# Patient Record
Sex: MALE | Race: WHITE | NOT HISPANIC OR LATINO | Employment: OTHER | URBAN - METROPOLITAN AREA
[De-identification: names, ages, dates, MRNs, and addresses within clinical notes are randomized per-mention and may not be internally consistent; named-entity substitution may affect disease eponyms.]

---

## 2020-02-14 RX ORDER — ACETAMINOPHEN 500 MG
500-1000 TABLET ORAL EVERY 6 HOURS PRN
Status: ON HOLD | COMMUNITY
End: 2024-02-27

## 2020-02-14 RX ORDER — MULTIPLE VITAMINS W/ MINERALS TAB 9MG-400MCG
1 TAB ORAL DAILY
Status: ON HOLD | COMMUNITY
End: 2024-02-27

## 2020-02-14 RX ORDER — OLMESARTAN MEDOXOMIL 40 MG/1
20 TABLET ORAL DAILY
Status: ON HOLD | COMMUNITY
End: 2024-02-27

## 2020-02-24 NOTE — OR NURSING
pts nasal culture from 2/20 mrsa neg, mssa positive, called to pt and Dr. Lundberg's office. Mupirocin ointment called to [facility] pharmacy in [place], discussed with pt ointment  rx and need for 2 extra hibilclens showeres, questions answered

## 2020-03-10 RX ORDER — NITROGLYCERIN 0.4 MG/1
0.4 TABLET SUBLINGUAL EVERY 5 MIN PRN
Status: ON HOLD | COMMUNITY
End: 2024-02-27

## 2020-03-10 ASSESSMENT — MIFFLIN-ST. JEOR: SCORE: 1711.12

## 2020-03-10 NOTE — PHARMACY-ADMISSION MEDICATION HISTORY
Medication history and patient interview completed by pre-admitting RN.  Reviewed by pharmacist, including [employer] dispense records and chart review.     Changes made to PTA medication list [per orders placed at pre-op appointment]:    Added: Nitroglycerin 0.4mg;  Changed: Olmesartan 40mg daily --> 20mg daily   Deleted: Valsartan [taking olmesartan]      Mani Mahoney, Pharm.D.      Status  Changed by  Time of change    Nurse Nicole Woodward RN  Fri Feb 14, 2020  1:13 PM     Prior to Admission medications    Medication Sig Last Dose Taking? Auth Provider   acetaminophen (TYLENOL) 500 MG tablet Take 500-1,000 mg by mouth every 6 hours as needed for mild pain  Yes Reported, Patient   metoprolol (LOPRESSOR) 50 MG tablet Take 1 tablet (50 mg) by mouth 2 times daily  Yes Jeramy Mireles MD   multivitamin w/minerals (MULTI-VITAMIN) tablet Take 1 tablet by mouth daily  Yes Reported, Patient   mupirocin (BACTROBAN) 2 % nasal ointment Apply 1 g into each nare 2 times daily  Yes Reported, Patient   nitroGLYcerin (NITROSTAT) 0.4 MG sublingual tablet Place 0.4 mg under the tongue every 5 minutes as needed for chest pain For chest pain place 1 tablet under the tongue every 5 minutes for 3 doses. If symptoms persist 5 minutes after 1st dose call 911.  Yes Unknown, Entered By History   olmesartan (BENICAR) 40 MG tablet Take 20 mg by mouth daily   Yes Reported, Patient   tadalafil (CIALIS) 10 MG tablet 1 tab po prn as directed  Yes Jeramy Mireles MD

## 2020-03-11 ENCOUNTER — HOSPITAL ENCOUNTER (OUTPATIENT)
Dept: LAB | Facility: CLINIC | Age: 70
Discharge: HOME | DRG: 460 | End: 2020-03-11
Attending: NEUROLOGICAL SURGERY | Admitting: NEUROLOGICAL SURGERY
Payer: COMMERCIAL

## 2020-03-11 DIAGNOSIS — Z01.812 PRE-OPERATIVE LABORATORY EXAMINATION: ICD-10-CM

## 2020-03-11 LAB
ABO + RH BLD: NORMAL
ABO + RH BLD: NORMAL
BLD GP AB SCN SERPL QL: NORMAL
BLOOD BANK CMNT PATIENT-IMP: NORMAL
SPECIMEN EXP DATE BLD: NORMAL

## 2020-03-11 PROCEDURE — 36415 COLL VENOUS BLD VENIPUNCTURE: CPT | Performed by: NEUROLOGICAL SURGERY

## 2020-03-11 PROCEDURE — 86901 BLOOD TYPING SEROLOGIC RH(D): CPT | Performed by: NEUROLOGICAL SURGERY

## 2020-03-11 PROCEDURE — 86900 BLOOD TYPING SEROLOGIC ABO: CPT | Performed by: NEUROLOGICAL SURGERY

## 2020-03-11 PROCEDURE — 86850 RBC ANTIBODY SCREEN: CPT | Performed by: NEUROLOGICAL SURGERY

## 2020-03-12 ENCOUNTER — ANESTHESIA EVENT (OUTPATIENT)
Dept: SURGERY | Facility: CLINIC | Age: 70
DRG: 460 | End: 2020-03-12
Payer: COMMERCIAL

## 2020-03-12 ENCOUNTER — APPOINTMENT (OUTPATIENT)
Dept: GENERAL RADIOLOGY | Facility: CLINIC | Age: 70
DRG: 460 | End: 2020-03-12
Attending: NEUROLOGICAL SURGERY
Payer: COMMERCIAL

## 2020-03-12 ENCOUNTER — ANESTHESIA (OUTPATIENT)
Dept: SURGERY | Facility: CLINIC | Age: 70
DRG: 460 | End: 2020-03-12
Payer: COMMERCIAL

## 2020-03-12 ENCOUNTER — HOSPITAL ENCOUNTER (INPATIENT)
Facility: CLINIC | Age: 70
LOS: 2 days | Discharge: HOME | DRG: 460 | End: 2020-03-14
Attending: NEUROLOGICAL SURGERY | Admitting: NEUROLOGICAL SURGERY
Payer: COMMERCIAL

## 2020-03-12 ENCOUNTER — RESULTS ONLY (OUTPATIENT)
Facility: CLINIC | Age: 70
End: 2020-03-12

## 2020-03-12 DIAGNOSIS — Z98.1 S/P LUMBAR FUSION: Primary | ICD-10-CM

## 2020-03-12 LAB — INTERPRETATION ECG - MUSE: NORMAL

## 2020-03-12 PROCEDURE — 25000128 H RX IP 250 OP 636: Performed by: ANESTHESIOLOGY

## 2020-03-12 PROCEDURE — 8E0WXBF COMPUTER ASSISTED PROCEDURE OF TRUNK REGION, WITH FLUOROSCOPY: ICD-10-PCS | Performed by: NEUROLOGICAL SURGERY

## 2020-03-12 PROCEDURE — 71000012 ZZH RECOVERY PHASE 1 LEVEL 1 FIRST HR: Performed by: NEUROLOGICAL SURGERY

## 2020-03-12 PROCEDURE — 40000277 XR SURGERY CARM FLUORO LESS THAN 5 MIN W STILLS: Mod: TC

## 2020-03-12 PROCEDURE — 27210794 ZZH OR GENERAL SUPPLY STERILE: Performed by: NEUROLOGICAL SURGERY

## 2020-03-12 PROCEDURE — 25000132 ZZH RX MED GY IP 250 OP 250 PS 637: Performed by: NEUROLOGICAL SURGERY

## 2020-03-12 PROCEDURE — 27210995 ZZH RX 272: Performed by: NEUROLOGICAL SURGERY

## 2020-03-12 PROCEDURE — 99221 1ST HOSP IP/OBS SF/LOW 40: CPT | Performed by: INTERNAL MEDICINE

## 2020-03-12 PROCEDURE — 25000300 ZZH OR RX SURGIFLO HEMOSTATIC MATRIX 10ML 199102S OPNP: Performed by: NEUROLOGICAL SURGERY

## 2020-03-12 PROCEDURE — 25000125 ZZHC RX 250: Performed by: NEUROLOGICAL SURGERY

## 2020-03-12 PROCEDURE — 12000000 ZZH R&B MED SURG/OB

## 2020-03-12 PROCEDURE — 25800030 ZZH RX IP 258 OP 636: Performed by: ANESTHESIOLOGY

## 2020-03-12 PROCEDURE — 25000128 H RX IP 250 OP 636: Performed by: NEUROLOGICAL SURGERY

## 2020-03-12 PROCEDURE — 25000125 ZZHC RX 250: Performed by: NURSE ANESTHETIST, CERTIFIED REGISTERED

## 2020-03-12 PROCEDURE — 40000306 ZZH STATISTIC PRE PROC ASSESS II: Performed by: NEUROLOGICAL SURGERY

## 2020-03-12 PROCEDURE — C1713 ANCHOR/SCREW BN/BN,TIS/BN: HCPCS | Performed by: NEUROLOGICAL SURGERY

## 2020-03-12 PROCEDURE — 93005 ELECTROCARDIOGRAM TRACING: CPT

## 2020-03-12 PROCEDURE — 71000013 ZZH RECOVERY PHASE 1 LEVEL 1 EA ADDTL HR: Performed by: NEUROLOGICAL SURGERY

## 2020-03-12 PROCEDURE — 36000069 ZZH SURGERY LEVEL 5 EA 15 ADDTL MIN: Performed by: NEUROLOGICAL SURGERY

## 2020-03-12 PROCEDURE — 37000008 ZZH ANESTHESIA TECHNICAL FEE, 1ST 30 MIN: Performed by: NEUROLOGICAL SURGERY

## 2020-03-12 PROCEDURE — 40000985 XR LUMBAR SPINE PORT 1 VW: Mod: TC

## 2020-03-12 PROCEDURE — 99207 ZZC CONSULT E&M CHANGED TO INITIAL LEVEL: CPT | Performed by: INTERNAL MEDICINE

## 2020-03-12 PROCEDURE — 36000071 ZZH SURGERY LEVEL 5 W FLUORO 1ST 30 MIN: Performed by: NEUROLOGICAL SURGERY

## 2020-03-12 PROCEDURE — 25000128 H RX IP 250 OP 636: Performed by: NURSE ANESTHETIST, CERTIFIED REGISTERED

## 2020-03-12 PROCEDURE — 93010 ELECTROCARDIOGRAM REPORT: CPT | Performed by: INTERNAL MEDICINE

## 2020-03-12 PROCEDURE — 25800025 ZZH RX 258: Performed by: NEUROLOGICAL SURGERY

## 2020-03-12 PROCEDURE — 37000009 ZZH ANESTHESIA TECHNICAL FEE, EACH ADDTL 15 MIN: Performed by: NEUROLOGICAL SURGERY

## 2020-03-12 PROCEDURE — 0SG1071 FUSION OF 2 OR MORE LUMBAR VERTEBRAL JOINTS WITH AUTOLOGOUS TISSUE SUBSTITUTE, POSTERIOR APPROACH, POSTERIOR COLUMN, OPEN APPROACH: ICD-10-PCS | Performed by: NEUROLOGICAL SURGERY

## 2020-03-12 PROCEDURE — 01NB0ZZ RELEASE LUMBAR NERVE, OPEN APPROACH: ICD-10-PCS | Performed by: NEUROLOGICAL SURGERY

## 2020-03-12 PROCEDURE — 25800030 ZZH RX IP 258 OP 636: Performed by: NURSE ANESTHETIST, CERTIFIED REGISTERED

## 2020-03-12 DEVICE — IMPLANTABLE DEVICE: Type: IMPLANTABLE DEVICE | Site: SPINE LUMBAR | Status: FUNCTIONAL

## 2020-03-12 RX ORDER — LIDOCAINE 40 MG/G
CREAM TOPICAL
Status: DISCONTINUED | OUTPATIENT
Start: 2020-03-12 | End: 2020-03-14 | Stop reason: HOSPADM

## 2020-03-12 RX ORDER — ACETAMINOPHEN 325 MG/1
975 TABLET ORAL EVERY 8 HOURS
Status: DISCONTINUED | OUTPATIENT
Start: 2020-03-12 | End: 2020-03-14 | Stop reason: HOSPADM

## 2020-03-12 RX ORDER — TRAMADOL HYDROCHLORIDE 50 MG/1
50 TABLET ORAL EVERY 6 HOURS PRN
Status: DISCONTINUED | OUTPATIENT
Start: 2020-03-12 | End: 2020-03-14 | Stop reason: HOSPADM

## 2020-03-12 RX ORDER — ONDANSETRON 2 MG/ML
4 INJECTION INTRAMUSCULAR; INTRAVENOUS EVERY 30 MIN PRN
Status: DISCONTINUED | OUTPATIENT
Start: 2020-03-12 | End: 2020-03-12 | Stop reason: HOSPADM

## 2020-03-12 RX ORDER — ACETAMINOPHEN 325 MG/1
650 TABLET ORAL EVERY 4 HOURS PRN
Status: DISCONTINUED | OUTPATIENT
Start: 2020-03-15 | End: 2020-03-12

## 2020-03-12 RX ORDER — MEPERIDINE HYDROCHLORIDE 50 MG/ML
12.5 INJECTION INTRAMUSCULAR; INTRAVENOUS; SUBCUTANEOUS EVERY 5 MIN PRN
Status: DISCONTINUED | OUTPATIENT
Start: 2020-03-12 | End: 2020-03-12 | Stop reason: HOSPADM

## 2020-03-12 RX ORDER — NEOSTIGMINE METHYLSULFATE 1 MG/ML
VIAL (ML) INJECTION PRN
Status: DISCONTINUED | OUTPATIENT
Start: 2020-03-12 | End: 2020-03-12

## 2020-03-12 RX ORDER — LABETALOL 20 MG/4 ML (5 MG/ML) INTRAVENOUS SYRINGE
PRN
Status: DISCONTINUED | OUTPATIENT
Start: 2020-03-12 | End: 2020-03-12

## 2020-03-12 RX ORDER — ALBUTEROL SULFATE 0.83 MG/ML
2.5 SOLUTION RESPIRATORY (INHALATION) EVERY 4 HOURS PRN
Status: DISCONTINUED | OUTPATIENT
Start: 2020-03-12 | End: 2020-03-12 | Stop reason: HOSPADM

## 2020-03-12 RX ORDER — LIDOCAINE 40 MG/G
CREAM TOPICAL
Status: DISCONTINUED | OUTPATIENT
Start: 2020-03-12 | End: 2020-03-12 | Stop reason: HOSPADM

## 2020-03-12 RX ORDER — METOCLOPRAMIDE HYDROCHLORIDE 5 MG/ML
5 INJECTION INTRAMUSCULAR; INTRAVENOUS EVERY 6 HOURS PRN
Status: DISCONTINUED | OUTPATIENT
Start: 2020-03-12 | End: 2020-03-14 | Stop reason: HOSPADM

## 2020-03-12 RX ORDER — OXYCODONE HYDROCHLORIDE 5 MG/1
5-10 TABLET ORAL EVERY 4 HOURS PRN
Status: DISCONTINUED | OUTPATIENT
Start: 2020-03-12 | End: 2020-03-14 | Stop reason: HOSPADM

## 2020-03-12 RX ORDER — ACETAMINOPHEN 325 MG/1
975 TABLET ORAL EVERY 8 HOURS
Status: DISCONTINUED | OUTPATIENT
Start: 2020-03-12 | End: 2020-03-12

## 2020-03-12 RX ORDER — DIPHENHYDRAMINE HYDROCHLORIDE 50 MG/ML
12.5 INJECTION INTRAMUSCULAR; INTRAVENOUS EVERY 6 HOURS PRN
Status: DISCONTINUED | OUTPATIENT
Start: 2020-03-12 | End: 2020-03-14 | Stop reason: HOSPADM

## 2020-03-12 RX ORDER — NALOXONE HYDROCHLORIDE 0.4 MG/ML
.1-.4 INJECTION, SOLUTION INTRAMUSCULAR; INTRAVENOUS; SUBCUTANEOUS
Status: ACTIVE | OUTPATIENT
Start: 2020-03-12 | End: 2020-03-13

## 2020-03-12 RX ORDER — ONDANSETRON 2 MG/ML
4 INJECTION INTRAMUSCULAR; INTRAVENOUS EVERY 6 HOURS PRN
Status: DISCONTINUED | OUTPATIENT
Start: 2020-03-12 | End: 2020-03-14 | Stop reason: HOSPADM

## 2020-03-12 RX ORDER — LABETALOL 20 MG/4 ML (5 MG/ML) INTRAVENOUS SYRINGE
10
Status: DISCONTINUED | OUTPATIENT
Start: 2020-03-12 | End: 2020-03-12 | Stop reason: HOSPADM

## 2020-03-12 RX ORDER — SODIUM CHLORIDE AND POTASSIUM CHLORIDE 150; 900 MG/100ML; MG/100ML
INJECTION, SOLUTION INTRAVENOUS CONTINUOUS
Status: DISCONTINUED | OUTPATIENT
Start: 2020-03-12 | End: 2020-03-14 | Stop reason: HOSPADM

## 2020-03-12 RX ORDER — VANCOMYCIN HYDROCHLORIDE 1 G/20ML
INJECTION, POWDER, LYOPHILIZED, FOR SOLUTION INTRAVENOUS PRN
Status: DISCONTINUED | OUTPATIENT
Start: 2020-03-12 | End: 2020-03-12 | Stop reason: HOSPADM

## 2020-03-12 RX ORDER — BUPIVACAINE HYDROCHLORIDE AND EPINEPHRINE 5; 5 MG/ML; UG/ML
INJECTION, SOLUTION EPIDURAL; INTRACAUDAL; PERINEURAL PRN
Status: DISCONTINUED | OUTPATIENT
Start: 2020-03-12 | End: 2020-03-12 | Stop reason: HOSPADM

## 2020-03-12 RX ORDER — CLINDAMYCIN PHOSPHATE 900 MG/50ML
900 INJECTION, SOLUTION INTRAVENOUS EVERY 8 HOURS
Status: DISCONTINUED | OUTPATIENT
Start: 2020-03-12 | End: 2020-03-14 | Stop reason: HOSPADM

## 2020-03-12 RX ORDER — DOCUSATE SODIUM 100 MG/1
100 CAPSULE, LIQUID FILLED ORAL 2 TIMES DAILY
Status: DISCONTINUED | OUTPATIENT
Start: 2020-03-12 | End: 2020-03-14 | Stop reason: HOSPADM

## 2020-03-12 RX ORDER — HYDROMORPHONE HYDROCHLORIDE 1 MG/ML
.3-.5 INJECTION, SOLUTION INTRAMUSCULAR; INTRAVENOUS; SUBCUTANEOUS EVERY 5 MIN PRN
Status: DISCONTINUED | OUTPATIENT
Start: 2020-03-12 | End: 2020-03-12 | Stop reason: HOSPADM

## 2020-03-12 RX ORDER — KETOROLAC TROMETHAMINE 30 MG/ML
INJECTION, SOLUTION INTRAMUSCULAR; INTRAVENOUS PRN
Status: DISCONTINUED | OUTPATIENT
Start: 2020-03-12 | End: 2020-03-12

## 2020-03-12 RX ORDER — ACETAMINOPHEN 325 MG/1
650 TABLET ORAL EVERY 4 HOURS PRN
Status: DISCONTINUED | OUTPATIENT
Start: 2020-03-15 | End: 2020-03-14 | Stop reason: HOSPADM

## 2020-03-12 RX ORDER — DIAZEPAM 10 MG/2ML
2.5 INJECTION, SOLUTION INTRAMUSCULAR; INTRAVENOUS ONCE
Status: DISCONTINUED | OUTPATIENT
Start: 2020-03-12 | End: 2020-03-14 | Stop reason: HOSPADM

## 2020-03-12 RX ORDER — SODIUM CHLORIDE, SODIUM LACTATE, POTASSIUM CHLORIDE, CALCIUM CHLORIDE 600; 310; 30; 20 MG/100ML; MG/100ML; MG/100ML; MG/100ML
INJECTION, SOLUTION INTRAVENOUS CONTINUOUS
Status: DISCONTINUED | OUTPATIENT
Start: 2020-03-12 | End: 2020-03-12 | Stop reason: HOSPADM

## 2020-03-12 RX ORDER — HYDRALAZINE HYDROCHLORIDE 20 MG/ML
2.5-5 INJECTION INTRAMUSCULAR; INTRAVENOUS EVERY 10 MIN PRN
Status: DISCONTINUED | OUTPATIENT
Start: 2020-03-12 | End: 2020-03-12 | Stop reason: HOSPADM

## 2020-03-12 RX ORDER — GLYCOPYRROLATE 0.2 MG/ML
INJECTION, SOLUTION INTRAMUSCULAR; INTRAVENOUS PRN
Status: DISCONTINUED | OUTPATIENT
Start: 2020-03-12 | End: 2020-03-12

## 2020-03-12 RX ORDER — ONDANSETRON 4 MG/1
4 TABLET, ORALLY DISINTEGRATING ORAL EVERY 6 HOURS PRN
Status: DISCONTINUED | OUTPATIENT
Start: 2020-03-12 | End: 2020-03-14 | Stop reason: HOSPADM

## 2020-03-12 RX ORDER — CLINDAMYCIN PHOSPHATE 900 MG/50ML
900 INJECTION, SOLUTION INTRAVENOUS
Status: COMPLETED | OUTPATIENT
Start: 2020-03-12 | End: 2020-03-12

## 2020-03-12 RX ORDER — EPHEDRINE SULFATE 50 MG/ML
INJECTION, SOLUTION INTRAMUSCULAR; INTRAVENOUS; SUBCUTANEOUS PRN
Status: DISCONTINUED | OUTPATIENT
Start: 2020-03-12 | End: 2020-03-12

## 2020-03-12 RX ORDER — FAMOTIDINE 20 MG/1
20 TABLET, FILM COATED ORAL 2 TIMES DAILY
Status: DISCONTINUED | OUTPATIENT
Start: 2020-03-12 | End: 2020-03-14 | Stop reason: HOSPADM

## 2020-03-12 RX ORDER — LIDOCAINE HYDROCHLORIDE 10 MG/ML
INJECTION, SOLUTION INFILTRATION; PERINEURAL PRN
Status: DISCONTINUED | OUTPATIENT
Start: 2020-03-12 | End: 2020-03-12

## 2020-03-12 RX ORDER — FENTANYL CITRATE 50 UG/ML
INJECTION, SOLUTION INTRAMUSCULAR; INTRAVENOUS PRN
Status: DISCONTINUED | OUTPATIENT
Start: 2020-03-12 | End: 2020-03-12

## 2020-03-12 RX ORDER — DIMENHYDRINATE 50 MG/ML
25 INJECTION, SOLUTION INTRAMUSCULAR; INTRAVENOUS
Status: DISCONTINUED | OUTPATIENT
Start: 2020-03-12 | End: 2020-03-12 | Stop reason: HOSPADM

## 2020-03-12 RX ORDER — HYDROMORPHONE HYDROCHLORIDE 1 MG/ML
.3-.5 INJECTION, SOLUTION INTRAMUSCULAR; INTRAVENOUS; SUBCUTANEOUS
Status: DISCONTINUED | OUTPATIENT
Start: 2020-03-12 | End: 2020-03-14 | Stop reason: HOSPADM

## 2020-03-12 RX ORDER — DIAZEPAM 10 MG/2ML
2.5 INJECTION, SOLUTION INTRAMUSCULAR; INTRAVENOUS
Status: COMPLETED | OUTPATIENT
Start: 2020-03-12 | End: 2020-03-12

## 2020-03-12 RX ORDER — NITROGLYCERIN 0.4 MG/1
0.4 TABLET SUBLINGUAL EVERY 5 MIN PRN
Status: DISCONTINUED | OUTPATIENT
Start: 2020-03-12 | End: 2020-03-14 | Stop reason: HOSPADM

## 2020-03-12 RX ORDER — FENTANYL CITRATE 50 UG/ML
25-50 INJECTION, SOLUTION INTRAMUSCULAR; INTRAVENOUS
Status: DISCONTINUED | OUTPATIENT
Start: 2020-03-12 | End: 2020-03-12 | Stop reason: HOSPADM

## 2020-03-12 RX ORDER — ONDANSETRON 4 MG/1
4 TABLET, ORALLY DISINTEGRATING ORAL EVERY 30 MIN PRN
Status: DISCONTINUED | OUTPATIENT
Start: 2020-03-12 | End: 2020-03-12 | Stop reason: HOSPADM

## 2020-03-12 RX ORDER — NALOXONE HYDROCHLORIDE 0.4 MG/ML
.1-.4 INJECTION, SOLUTION INTRAMUSCULAR; INTRAVENOUS; SUBCUTANEOUS
Status: DISCONTINUED | OUTPATIENT
Start: 2020-03-12 | End: 2020-03-14 | Stop reason: HOSPADM

## 2020-03-12 RX ORDER — PROCHLORPERAZINE MALEATE 5 MG
5 TABLET ORAL EVERY 6 HOURS PRN
Status: DISCONTINUED | OUTPATIENT
Start: 2020-03-12 | End: 2020-03-14 | Stop reason: HOSPADM

## 2020-03-12 RX ORDER — LOSARTAN POTASSIUM 100 MG/1
100 TABLET ORAL DAILY
Status: DISCONTINUED | OUTPATIENT
Start: 2020-03-12 | End: 2020-03-14 | Stop reason: HOSPADM

## 2020-03-12 RX ORDER — DIPHENHYDRAMINE HCL 12.5MG/5ML
12.5 LIQUID (ML) ORAL EVERY 6 HOURS PRN
Status: DISCONTINUED | OUTPATIENT
Start: 2020-03-12 | End: 2020-03-14 | Stop reason: HOSPADM

## 2020-03-12 RX ORDER — CLINDAMYCIN PHOSPHATE 900 MG/50ML
900 INJECTION, SOLUTION INTRAVENOUS SEE ADMIN INSTRUCTIONS
Status: DISCONTINUED | OUTPATIENT
Start: 2020-03-12 | End: 2020-03-12 | Stop reason: HOSPADM

## 2020-03-12 RX ORDER — PROPOFOL 10 MG/ML
INJECTION, EMULSION INTRAVENOUS PRN
Status: DISCONTINUED | OUTPATIENT
Start: 2020-03-12 | End: 2020-03-12

## 2020-03-12 RX ORDER — PROPOFOL 10 MG/ML
INJECTION, EMULSION INTRAVENOUS CONTINUOUS PRN
Status: DISCONTINUED | OUTPATIENT
Start: 2020-03-12 | End: 2020-03-12

## 2020-03-12 RX ORDER — ONDANSETRON 2 MG/ML
INJECTION INTRAMUSCULAR; INTRAVENOUS PRN
Status: DISCONTINUED | OUTPATIENT
Start: 2020-03-12 | End: 2020-03-12

## 2020-03-12 RX ORDER — METOPROLOL TARTRATE 50 MG
50 TABLET ORAL 2 TIMES DAILY
Status: DISCONTINUED | OUTPATIENT
Start: 2020-03-12 | End: 2020-03-14 | Stop reason: HOSPADM

## 2020-03-12 RX ORDER — MAGNESIUM HYDROXIDE 1200 MG/15ML
LIQUID ORAL PRN
Status: DISCONTINUED | OUTPATIENT
Start: 2020-03-12 | End: 2020-03-12 | Stop reason: HOSPADM

## 2020-03-12 RX ORDER — DEXAMETHASONE SODIUM PHOSPHATE 4 MG/ML
INJECTION, SOLUTION INTRA-ARTICULAR; INTRALESIONAL; INTRAMUSCULAR; INTRAVENOUS; SOFT TISSUE PRN
Status: DISCONTINUED | OUTPATIENT
Start: 2020-03-12 | End: 2020-03-12

## 2020-03-12 RX ORDER — METOCLOPRAMIDE 5 MG/1
5 TABLET ORAL EVERY 6 HOURS PRN
Status: DISCONTINUED | OUTPATIENT
Start: 2020-03-12 | End: 2020-03-14 | Stop reason: HOSPADM

## 2020-03-12 RX ORDER — METHOCARBAMOL 500 MG/1
500 TABLET, FILM COATED ORAL 4 TIMES DAILY PRN
Status: DISCONTINUED | OUTPATIENT
Start: 2020-03-12 | End: 2020-03-14 | Stop reason: HOSPADM

## 2020-03-12 RX ADMIN — FAMOTIDINE 20 MG: 20 TABLET ORAL at 20:51

## 2020-03-12 RX ADMIN — LIDOCAINE HYDROCHLORIDE 50 MG: 10 INJECTION, SOLUTION INFILTRATION; PERINEURAL at 07:58

## 2020-03-12 RX ADMIN — LOSARTAN POTASSIUM 100 MG: 100 TABLET, FILM COATED ORAL at 16:15

## 2020-03-12 RX ADMIN — DEXAMETHASONE SODIUM PHOSPHATE 8 MG: 4 INJECTION, SOLUTION INTRA-ARTICULAR; INTRALESIONAL; INTRAMUSCULAR; INTRAVENOUS; SOFT TISSUE at 07:58

## 2020-03-12 RX ADMIN — ONDANSETRON HYDROCHLORIDE 4 MG: 2 INJECTION, SOLUTION INTRAVENOUS at 07:58

## 2020-03-12 RX ADMIN — SODIUM CHLORIDE, POTASSIUM CHLORIDE, SODIUM LACTATE AND CALCIUM CHLORIDE: 600; 310; 30; 20 INJECTION, SOLUTION INTRAVENOUS at 07:52

## 2020-03-12 RX ADMIN — Medication 10 MG: at 08:40

## 2020-03-12 RX ADMIN — ROCURONIUM BROMIDE 10 MG: 10 INJECTION INTRAVENOUS at 09:56

## 2020-03-12 RX ADMIN — KETOROLAC TROMETHAMINE 30 MG: 30 INJECTION, SOLUTION INTRAMUSCULAR at 07:58

## 2020-03-12 RX ADMIN — ROCURONIUM BROMIDE 50 MG: 10 INJECTION INTRAVENOUS at 07:58

## 2020-03-12 RX ADMIN — Medication 4 MG: at 11:02

## 2020-03-12 RX ADMIN — MIDAZOLAM 2 MG: 1 INJECTION INTRAMUSCULAR; INTRAVENOUS at 07:52

## 2020-03-12 RX ADMIN — POTASSIUM CHLORIDE AND SODIUM CHLORIDE: 900; 150 INJECTION, SOLUTION INTRAVENOUS at 15:08

## 2020-03-12 RX ADMIN — Medication 10 MG: at 08:11

## 2020-03-12 RX ADMIN — HYDROMORPHONE HYDROCHLORIDE 2 MG: 1 INJECTION, SOLUTION INTRAMUSCULAR; INTRAVENOUS; SUBCUTANEOUS at 07:58

## 2020-03-12 RX ADMIN — LABETALOL 20 MG/4 ML (5 MG/ML) INTRAVENOUS SYRINGE 10 MG: at 11:09

## 2020-03-12 RX ADMIN — GLYCOPYRROLATE 0.6 MG: 0.2 INJECTION, SOLUTION INTRAMUSCULAR; INTRAVENOUS at 11:02

## 2020-03-12 RX ADMIN — FENTANYL CITRATE 50 MCG: 50 INJECTION INTRAMUSCULAR; INTRAVENOUS at 11:37

## 2020-03-12 RX ADMIN — PHENYLEPHRINE HYDROCHLORIDE 100 MCG: 10 INJECTION INTRAVENOUS at 08:05

## 2020-03-12 RX ADMIN — FENTANYL CITRATE 100 MCG: 50 INJECTION, SOLUTION INTRAMUSCULAR; INTRAVENOUS at 07:58

## 2020-03-12 RX ADMIN — PHENYLEPHRINE HYDROCHLORIDE 100 MCG: 10 INJECTION INTRAVENOUS at 08:40

## 2020-03-12 RX ADMIN — ROCURONIUM BROMIDE 30 MG: 10 INJECTION INTRAVENOUS at 08:47

## 2020-03-12 RX ADMIN — CLINDAMYCIN PHOSPHATE 900 MG: 900 INJECTION, SOLUTION INTRAVENOUS at 07:52

## 2020-03-12 RX ADMIN — PROPOFOL 75 MCG/KG/MIN: 10 INJECTION, EMULSION INTRAVENOUS at 07:58

## 2020-03-12 RX ADMIN — SODIUM CHLORIDE, POTASSIUM CHLORIDE, SODIUM LACTATE AND CALCIUM CHLORIDE: 600; 310; 30; 20 INJECTION, SOLUTION INTRAVENOUS at 08:21

## 2020-03-12 RX ADMIN — ACETAMINOPHEN 975 MG: 325 TABLET, FILM COATED ORAL at 16:14

## 2020-03-12 RX ADMIN — METOPROLOL TARTRATE 50 MG: 50 TABLET, FILM COATED ORAL at 20:54

## 2020-03-12 RX ADMIN — PROPOFOL 200 MG: 10 INJECTION, EMULSION INTRAVENOUS at 07:58

## 2020-03-12 RX ADMIN — DIAZEPAM 2.5 MG: 5 INJECTION, SOLUTION INTRAMUSCULAR; INTRAVENOUS at 12:02

## 2020-03-12 RX ADMIN — Medication: at 11:43

## 2020-03-12 RX ADMIN — GLYCOPYRROLATE 0.2 MG: 0.2 INJECTION, SOLUTION INTRAMUSCULAR; INTRAVENOUS at 07:58

## 2020-03-12 RX ADMIN — DOCUSATE SODIUM 100 MG: 100 CAPSULE, LIQUID FILLED ORAL at 20:51

## 2020-03-12 RX ADMIN — CLINDAMYCIN PHOSPHATE 900 MG: 900 INJECTION, SOLUTION INTRAVENOUS at 16:15

## 2020-03-12 SDOH — HEALTH STABILITY: MENTAL HEALTH: CURRENT SMOKER: 0

## 2020-03-12 ASSESSMENT — ACTIVITIES OF DAILY LIVING (ADL)
NUMBER_OF_TIMES_PATIENT_HAS_FALLEN_WITHIN_LAST_SIX_MONTHS: 1
ADLS_ACUITY_SCORE: 14
RETIRED_COMMUNICATION: 0-->UNDERSTANDS/COMMUNICATES WITHOUT DIFFICULTY
SWALLOWING: 0-->SWALLOWS FOODS/LIQUIDS WITHOUT DIFFICULTY
DRESS: 0-->INDEPENDENT
BATHING: 0-->INDEPENDENT
WHICH_OF_THE_ABOVE_FUNCTIONAL_RISKS_HAD_A_RECENT_ONSET_OR_CHANGE?: AMBULATION;TRANSFERRING;TOILETING;BATHING;DRESSING
AMBULATION: 0-->INDEPENDENT
COGNITION: 0 - NO COGNITION ISSUES REPORTED
TRANSFERRING: 0-->INDEPENDENT
FALL_HISTORY_WITHIN_LAST_SIX_MONTHS: YES
RETIRED_EATING: 0-->INDEPENDENT
ADLS_ACUITY_SCORE: 14
TOILETING: 0-->INDEPENDENT

## 2020-03-12 ASSESSMENT — MIFFLIN-ST. JEOR: SCORE: 1706.59

## 2020-03-12 NOTE — CONSULTS
Hospitalist Consultation      Lokesh Shipley Jr. MRN# [id number]   YOB: 1950 Age: 69 year old   Date of Admission: 3/12/2020     Requesting Physician: Dr. Lundberg   Reason for consult: Medical Management           Assessment and Plan:     S/P lumbar fusion  69-year-old man with history of coronary artery disease, hypertension, back pain and lumbar radiculopathy admitted for decompression and fusion done by Dr. Lundberg.  Were consulted postop for medical management.  Evaluation is only concerning for an episode of 2 weeks ago of chest discomfort lasted around 30 to 45 minutes and relieved by nitroglycerin.  This did not recur, was not associated with any other symptom or shortness of breath.  Does not get angina on a regular basis and this was very unusual for him however he did not mention it to anyone.  His EKG was done on February 20 prior to this episode for preop.    1. Lumbar decompression and fusion  2. Coronary artery disease and history of CABG in 2009, recent episode of chest discomfort relieved by nitroglycerin 2 weeks ago, does not get angina on a regular basis.  3. Hypertension on metoprolol and losartan  4. Refuses to take aspirin or statin    Check EKG 12-lead for baseline  Patient informed to report any episode of chest discomfort or chest pain  Continue metoprolol and losartan  Pain management, DVT prophylaxis and therapy per surgeon  Check hemoglobin and basic metabolic panel in the morning    CODE STATUS full code  Thank you for the consultation we will follow-up with you while patient is here                 History of Present Illness:   This patient is a 69 year old male who presents with lumbar radiculopathy and neurogenic claudication, had a L3-L4 and L4-L5 decompression laminectomy with fusion.  Postop were consulted to see the patient for medical management.  He does have history of coronary artery disease and CABG in 2009.  He denies any symptom related to his heart  condition except 2 weeks ago he had chest pain for almost half an hour relieved by nitroglycerin.  He almost never gets chest pain.  He did not mention it to anyone, his significant other in the room was surprised when she heard it during my interview.  He does not seem to be seeing a cardiologist on a regular basis.  He has not had a recent stress test.  He takes metoprolol and losartan, refuses to take aspirin or statin.  He does not smoke.  During the interview he denied any chest pain any recurrent episodes since 2 weeks ago of what he described as angina, denies any shortness of breath any dyspnea on exertion.  Denies any bleeding any fever chills nausea vomiting or other symptoms.              Past Medical History:     Past Medical History:   Diagnosis Date     Coronary artery disease      Heart attack (H) 2000     Hypertension      Other chronic pain     low back and radiates down both legs.  Also, numbness & tingling down both legs.     Stented coronary artery 2000    x1             Past Surgical History:     Past Surgical History:   Procedure Laterality Date     angiogram with stent placement       BACK SURGERY      low back discectomy     CARDIAC SURGERY  2009    CABG x 3               Social History:     Social History     Tobacco Use     Smoking status: Never Smoker     Smokeless tobacco: Never Used   Substance Use Topics     Alcohol use: Yes     Comment: 0-4 weekly             Family History:   Family history of heart disease and diabetes           Allergies:     Allergies   Allergen Reactions     Penicillins Shortness Of Breath     Sulfa Drugs Shortness Of Breath             Medications:     Medications Prior to Admission   Medication Sig Dispense Refill Last Dose     acetaminophen (TYLENOL) 500 MG tablet Take 500-1,000 mg by mouth every 6 hours as needed for mild pain   Past Week at Unknown time     metoprolol (LOPRESSOR) 50 MG tablet Take 1 tablet (50 mg) by mouth 2 times daily 180 tablet 3 3/12/2020  "at Unknown time     multivitamin w/minerals (MULTI-VITAMIN) tablet Take 1 tablet by mouth daily   Past Month at Unknown time     mupirocin (BACTROBAN) 2 % nasal ointment Apply 1 g into each nare 2 times daily   3/12/2020 at Unknown time     nitroGLYcerin (NITROSTAT) 0.4 MG sublingual tablet Place 0.4 mg under the tongue every 5 minutes as needed for chest pain For chest pain place 1 tablet under the tongue every 5 minutes for 3 doses. If symptoms persist 5 minutes after 1st dose call 911.   Past Week at Unknown time     olmesartan (BENICAR) 40 MG tablet Take 20 mg by mouth daily    3/11/2020 at Unknown time     tadalafil (CIALIS) 10 MG tablet 1 tab po prn as directed 9 tablet 0 Past Week at Unknown time               Review of Systems:   A comprehensive greater than 10 system review of systems was carried out.  Pertinent positives and negatives are noted above.  Otherwise negative for contributory info.            Physical Exam:   Vitals were reviewed  Blood pressure 108/60, pulse 68, temperature 99.6  F (37.6  C), temperature source Temporal, resp. rate 10, height 1.778 m (5' 10\"), weight 92.5 kg (204 lb), SpO2 90 %.  Exam:   GENERAL:  Comfortable.  PSYCH: pleasant, oriented, No acute distress.  EYES: PERRLA, Normal conjunctiva.  HEART:  Normal S1, S2 with no edema.  LUNGS:  Clear to auscultation, normal Respiratory effort.  ABDOMEN:  Soft, no hepatosplenomegaly, normal bowel sounds.  SKIN:  Dry to touch, No rash.  Neuro: Non focal with normal motor power, CN's and Reflexes.           Data:   Past 24 hours labs, studies, and imaging were reviewed.  No recent labs       Lab Results   Component Value Date     10/18/2014    Lab Results   Component Value Date    CHLORIDE 105 10/18/2014    Lab Results   Component Value Date    BUN 11 10/18/2014      Lab Results   Component Value Date    POTASSIUM 3.9 10/18/2014    Lab Results   Component Value Date    CO2 28 10/18/2014    Lab Results   Component Value Date    CR " 0.83 10/18/2014        Lab Results   Component Value Date    WBC 7.4 10/21/2014    HGB 15.4 10/18/2014    HCT 43.9 10/18/2014    MCV 91 10/18/2014     10/18/2014

## 2020-03-12 NOTE — ANESTHESIA PREPROCEDURE EVALUATION
Anesthesia Pre-Procedure Evaluation    Patient: Lokesh Shipley Jr.   MRN: [id number] : 1950          Preoperative Diagnosis: Spinal stenosis, lumbar region, with neurogenic claudication [M48.062]  Lumbar radiculopathy [M54.16]    Procedure(s):  Lumbar 3-5 Decompression and Fusion    Past Medical History:   Diagnosis Date     Coronary artery disease      Heart attack (H)      Hypertension      Other chronic pain     low back and radiates down both legs.  Also, numbness & tingling down both legs.     Stented coronary artery 2000    x1     Past Surgical History:   Procedure Laterality Date     angiogram with stent placement       BACK SURGERY      low back discectomy     CARDIAC SURGERY      CABG x 3     Anesthesia Evaluation     . Pt has had prior anesthetic. Type: General and MAC    No history of anesthetic complications          ROS/MED HX    ENT/Pulmonary:  - neg pulmonary ROS     Neurologic:     (+)neuropathy - lumbar radiculopathy,     Cardiovascular:     (+) Dyslipidemia, hypertension--CAD, -past MI,CABG-date: , stent,  1 Bare Metal Stent .. : . . . :. . Previous cardiac testing date:results:Stress Testdate: results:I.  Exercise Test     1.  The patient exercised to a submaximal cardiac workload with   average functional aerobic capacity demonstrated.   2.  The patient experienced no chest pain during the test.  Due to   inability to reach target heart rate, this is a nondiagnostic study.     The patient demonstrated a hypertensive response to exercise in the   setting of baseline hypertension.   3.  ECG report done under separate cover.        II.  Myocardial Perfusion Scintigraphy    1.  Myocardial perfusion using single isotope technique demonstrated   a small inferobasal fixed defect that could be consistent with   diaphragmatic attenuation but a prior nontransmural myocardial   infarction cannot be excluded.     2.  Gating demonstrated normal wall motion.   3.  The ejection  fraction is 64%.  There is no transient ischemic   dilatation.     4.  No previous study was available for comparison.    date: results: date: results:          METS/Exercise Tolerance:     Hematologic:  - neg hematologic  ROS       Musculoskeletal:   (+) arthritis,  -       GI/Hepatic:  - neg GI/hepatic ROS       Renal/Genitourinary:  - ROS Renal section negative       Endo:  - neg endo ROS       Psychiatric:  - neg psychiatric ROS       Infectious Disease:  - neg infectious disease ROS       Malignancy:         Other:    (+) H/O Chronic Pain,                        Physical Exam  Normal systems: cardiovascular, pulmonary and dental    Airway   Mallampati: II  TM distance: >3 FB  Neck ROM: full    Dental     Cardiovascular   Rhythm and rate: regular and normal      Pulmonary    breath sounds clear to auscultation    Other findings: Lab Test        10/21/14     10/18/14     10/18/14     10/18/14                       0826          2037          1614          1611          WBC          7.4          17.5*         --          9.7           HGB           --          15.4         17.0         16.2          MCV           --          91            --          91            PLT           --          350           --          367            Lab Test        10/18/14     10/18/14     10/18/14     10/15/13                       2037          1614          1611          0747          NA           136          138          136          141           POTASSIUM    3.9          3.8          3.8          5.3           CHLORIDE     105          101          103          102           CO2          28            --          27           31            BUN          11           11           11           16            CR           0.83          --          0.85         0.82          ANIONGAP     3            10           6            9             BROOK          8.4*          --          8.8          9.2           GLC          132*          "137*         132*         103*                  ECG  SB and 1st degreee AVBwith old IMI         Lab Results   Component Value Date    WBC 7.4 10/21/2014    HGB 15.4 10/18/2014    HCT 43.9 10/18/2014     10/18/2014     10/18/2014    POTASSIUM 3.9 10/18/2014    CHLORIDE 105 10/18/2014    CO2 28 10/18/2014    BUN 11 10/18/2014    CR 0.83 10/18/2014     (H) 10/18/2014    BROOK 8.4 (L) 10/18/2014    MAG 1.8 02/21/2009    ALBUMIN 3.9 10/18/2014    PROTTOTAL 7.4 10/18/2014    ALT 42 10/18/2014    AST 39 10/18/2014    ALKPHOS 58 10/18/2014    BILITOTAL 0.7 10/18/2014    LIPASE 43 (L) 10/18/2014    AMYLASE 31 10/18/2014    PTT 26 07/18/2009    INR 1.00 07/18/2009       Preop Vitals  BP Readings from Last 3 Encounters:   10/21/14 122/69   10/18/14 (!) 133/100    Pulse Readings from Last 3 Encounters:   10/21/14 64      Resp Readings from Last 3 Encounters:   10/21/14 16    SpO2 Readings from Last 3 Encounters:   10/21/14 92%   10/18/14 96%      Temp Readings from Last 1 Encounters:   10/21/14 97.8  F (36.6  C) (Oral)    Ht Readings from Last 1 Encounters:   03/10/20 1.778 m (5' 10\")      Wt Readings from Last 1 Encounters:   03/10/20 93 kg (205 lb)    Estimated body mass index is 29.41 kg/m  as calculated from the following:    Height as of this encounter: 1.778 m (5' 10\").    Weight as of this encounter: 93 kg (205 lb).       Anesthesia Plan      History & Physical Review  History and physical reviewed and following examination; no interval change.    ASA Status:  3 .    NPO Status:  > 8 hours    Plan for General with Intravenous induction. Maintenance will be Balanced.    PONV prophylaxis:  Ondansetron (or other 5HT-3) and Dexamethasone or Solumedrol    The patient is not a current smoker      Postoperative Care  Postoperative pain management:  IV analgesics, Oral pain medications and Multi-modal analgesia.      Consents  Anesthetic plan, risks, benefits and alternatives discussed with:  Patient.  Use of " blood products discussed: No .   .                 Erick Canales MD                    .

## 2020-03-12 NOTE — PLAN OF CARE
Pt arrived to floor at 1330. VSS. Afebrile. 2LNC. Capno WNL. Miller patient. Two LUCINDA's in place. Dressing c/d/I. Cms intact. Denies nb/t. Denies nausea. Tolerating ice and clear liquids. Pt has not been up yet. Brace is here. Will continue to monitor.

## 2020-03-12 NOTE — OP NOTE
OPERATIVE REPORT        PREOPERATIVE DIAGNOSIS:   1. Previous L5-S1 microdiscectomy in 1999  2. L3-5 severe stenosis with compression and clumping of roots  3. Lumbar radiculopathy  4. Neurogenic claudication   5. Low back pain     POSTOPERATIVE DIAGNOSIS: Same.     PROCEDURE:   1. L3-4 and L4-5 decompressive laminectomies with medial facetectomies with decompression and exposure of the L3, L4 and L5 roots bilaterally  2. Placement of Nuvasive Reline traction pedicle screws bilaterally from L3-5  3. L3-5 posterior lateral fusion with placement of locally harvested autologous bone  4. Use of Stealth and O-arm intraoperative neuronavigation  5. Use of portable X-ray and intraoperative microscope    ASSISTANT: Ada Espinoza CNP and ST Felipa whose assistance was required throughout the case for positioning, exposure, retraction/suctioning during decompression, implant hardware placement, wound closure and transferred to postoperative bed.    INDICATION FOR PROCEDURE: The patient is a 69 year old male that presented with the above clinical and radiographic findings. After reviewing the examination and imaging studies, the decision was made to proceed with the above procedure. The patient understood the risks of surgery to be infection, CSF leak, nerve root injury, failure of hardware, the need for recurrent surgery, epidural fibrosis, weakness, coma and death. The patient voiced understanding and wanted to proceed.     DESCRIPTION OF PROCEDURE: The patient was seen in the pre op area and the procedure was discussed with the patient and spouse once again and all questions were answered. The consent was then signed and the lumbar spine was marked with a marker. The patient was transported to the operating room on a stretcher and received general endotracheal anesthesia and a Miller catheter was placed. The patient was placed on the operating table in the prone position on the Quinn frame with all pressure  points padded. The back was then prepped and draped in a normal sterile fashion and portable X-ray was used to identify the appropriate level. Local anesthesia was injected along the planned incision with 10 blade scalpel used to reopen the midline incision with dissection down through the subcutaneous tissue to the fascia. The fascia was reopened bilaterally with the Bovie cautery. Subperiosteal dissection was used to expose the lamina facet joint and transverse processes from L3-5. The Versatrac retractor was then placed to retract the paraspinous muscles. Attention then turned to the decompression and the operating microscope was brought into the field and L3-4 and L4-5 bilateral laminectomies with medial facetectomies and decompression of roots was performed with the Midas Farshad drill, the Kerrison rongeur and the pituitary rongeur which were used to remove the spinous process, the lamina and facet joint. This completely decompressed and exposed the L3, L4 and L5 roots bilaterally. The Peña ball hook was used to verify that the nerve roots were free at each level. Next, the Stealth and O-arm were brought in for intraoperative pedicle screw placement. The pedicle screws were placed using the normal technique of a  hole with the Midas Farshad drill, the gear shift probe to penetrate the pedicle and the pedicle screws were then navigated down the pedicles of L3, L4 and L5 bilaterally. Next, the connecting rods were secured from L3-5 and the locking caps were secured with the counter torque system. I then placed 5 ml of Evicel were placed over the dura. Copious irrigation was performed with saline. The wound was irrigated once again and the retractors were removed. Decortication of the lateral facet and transverse process was performed from L3-5 and locally harvested autologous bone were placed out laterally for the posterolateral fusion. Next, 2 Quinn-Triana drains were left subfascially. The fascia was closed  with 0 Vicryl, the subcutaneous tissue closed with 2 - 0 and 3-0 Vicryl, and the skin closed with staples. The patient was then transported back to the stretcher, extubated, and sent to recovery.     At the end of the case, all counts were correct    No complications    Estimated blood loss  100 ml     The patient received Clindamycin preoperatively and Vancomycin powder in the wound       Jose Lundberg MD, MS, FAANS

## 2020-03-12 NOTE — ANESTHESIA POSTPROCEDURE EVALUATION
Patient: Lokesh Shipley Jr.    Procedure(s):  Lumbar 3-5 Decompression and Fusion    Diagnosis:Spinal stenosis, lumbar region, with neurogenic claudication [M48.062]  Lumbar radiculopathy [M54.16]  Diagnosis Additional Information: No value filed.    Anesthesia Type:  General    Note:  Anesthesia Post Evaluation    Patient location during evaluation: PACU  Patient participation: Able to fully participate in evaluation  Level of consciousness: awake  Pain management: adequate  multimodal analgesia used between 6 hours prior to anesthesia start to PACU dischargeAirway patency: patent  Cardiovascular status: acceptable  Respiratory status: acceptable  Hydration status: acceptable  PONV: none             Last vitals:  Vitals:    03/12/20 1137 03/12/20 1145 03/12/20 1200   BP:  114/62 93/60   Pulse:      Resp:  11 15   Temp:      SpO2: 98% 90% 96%         Electronically Signed By: Erick Canales MD  March 12, 2020  12:21 PM

## 2020-03-12 NOTE — ANESTHESIA CARE TRANSFER NOTE
Patient: Lokesh Shipley Jr.    Procedure(s):  Lumbar 3-5 Decompression and Fusion    Diagnosis: Spinal stenosis, lumbar region, with neurogenic claudication [M48.062]  Lumbar radiculopathy [M54.16]  Diagnosis Additional Information: No value filed.    Anesthesia Type:   General     Note:    Patient transferred to:PACU  Handoff Report: Identifed the Patient, Identified the Reponsible Provider, Reviewed the pertinent medical history, Discussed the surgical course, Reviewed Intra-OP anesthesia mangement and issues during anesthesia, Set expectations for post-procedure period and Allowed opportunity for questions and acknowledgement of understanding      Vitals: (Last set prior to Anesthesia Care Transfer)    CRNA VITALS  3/12/2020 1047 - 3/12/2020 1124      3/12/2020             NIBP:  115/69    Pulse:  71    NIBP Mean:  88    SpO2:  94 %    Resp Rate (observed):  8                Electronically Signed By: SINTIA Chowdary CRNA  March 12, 2020  11:24 AM

## 2020-03-13 ENCOUNTER — APPOINTMENT (OUTPATIENT)
Dept: PHYSICAL THERAPY | Facility: CLINIC | Age: 70
DRG: 460 | End: 2020-03-13
Attending: NEUROLOGICAL SURGERY
Payer: COMMERCIAL

## 2020-03-13 ENCOUNTER — APPOINTMENT (OUTPATIENT)
Dept: OCCUPATIONAL THERAPY | Facility: CLINIC | Age: 70
DRG: 460 | End: 2020-03-13
Attending: NEUROLOGICAL SURGERY
Payer: COMMERCIAL

## 2020-03-13 LAB
ANION GAP SERPL CALCULATED.3IONS-SCNC: 5 MMOL/L (ref 3–14)
BUN SERPL-MCNC: 15 MG/DL (ref 7–30)
CALCIUM SERPL-MCNC: 8.5 MG/DL (ref 8.5–10.1)
CHLORIDE SERPL-SCNC: 106 MMOL/L (ref 94–109)
CO2 SERPL-SCNC: 29 MMOL/L (ref 20–32)
CREAT SERPL-MCNC: 0.72 MG/DL (ref 0.66–1.25)
GFR SERPL CREATININE-BSD FRML MDRD: >90 ML/MIN/{1.73_M2}
GLUCOSE SERPL-MCNC: 124 MG/DL (ref 70–99)
HGB BLD-MCNC: 11 G/DL (ref 13.3–17.7)
INTERPRETATION ECG - MUSE: NORMAL
POTASSIUM SERPL-SCNC: 4.4 MMOL/L (ref 3.4–5.3)
SODIUM SERPL-SCNC: 140 MMOL/L (ref 133–144)

## 2020-03-13 PROCEDURE — 97161 PT EVAL LOW COMPLEX 20 MIN: CPT | Mod: GP | Performed by: PHYSICAL THERAPIST

## 2020-03-13 PROCEDURE — 12000000 ZZH R&B MED SURG/OB

## 2020-03-13 PROCEDURE — 25000132 ZZH RX MED GY IP 250 OP 250 PS 637: Performed by: NEUROLOGICAL SURGERY

## 2020-03-13 PROCEDURE — 97530 THERAPEUTIC ACTIVITIES: CPT | Mod: GP | Performed by: PHYSICAL THERAPIST

## 2020-03-13 PROCEDURE — 97165 OT EVAL LOW COMPLEX 30 MIN: CPT | Mod: GO

## 2020-03-13 PROCEDURE — 36415 COLL VENOUS BLD VENIPUNCTURE: CPT | Performed by: NEUROLOGICAL SURGERY

## 2020-03-13 PROCEDURE — 80048 BASIC METABOLIC PNL TOTAL CA: CPT | Performed by: NEUROLOGICAL SURGERY

## 2020-03-13 PROCEDURE — 85018 HEMOGLOBIN: CPT | Performed by: NEUROLOGICAL SURGERY

## 2020-03-13 PROCEDURE — 99207 ZZC CDG-MDM COMPONENT: MEETS LOW - DOWN CODED: CPT | Performed by: HOSPITALIST

## 2020-03-13 PROCEDURE — 97535 SELF CARE MNGMENT TRAINING: CPT | Mod: GO

## 2020-03-13 PROCEDURE — 99231 SBSQ HOSP IP/OBS SF/LOW 25: CPT | Performed by: HOSPITALIST

## 2020-03-13 PROCEDURE — 97116 GAIT TRAINING THERAPY: CPT | Mod: GP | Performed by: PHYSICAL THERAPIST

## 2020-03-13 PROCEDURE — 25000125 ZZHC RX 250: Performed by: NEUROLOGICAL SURGERY

## 2020-03-13 RX ORDER — OXYCODONE HYDROCHLORIDE 5 MG/1
5-10 TABLET ORAL EVERY 4 HOURS PRN
Qty: 50 TABLET | Refills: 0 | Status: SHIPPED | OUTPATIENT
Start: 2020-03-13 | End: 2020-03-14

## 2020-03-13 RX ORDER — METHOCARBAMOL 500 MG/1
500 TABLET, FILM COATED ORAL EVERY 8 HOURS PRN
Qty: 40 TABLET | Refills: 0 | Status: ON HOLD | OUTPATIENT
Start: 2020-03-13 | End: 2024-02-27

## 2020-03-13 RX ADMIN — ACETAMINOPHEN 975 MG: 325 TABLET, FILM COATED ORAL at 00:17

## 2020-03-13 RX ADMIN — DOCUSATE SODIUM 100 MG: 100 CAPSULE, LIQUID FILLED ORAL at 08:53

## 2020-03-13 RX ADMIN — CLINDAMYCIN PHOSPHATE 900 MG: 900 INJECTION, SOLUTION INTRAVENOUS at 00:17

## 2020-03-13 RX ADMIN — ACETAMINOPHEN 975 MG: 325 TABLET, FILM COATED ORAL at 16:11

## 2020-03-13 RX ADMIN — CLINDAMYCIN PHOSPHATE 900 MG: 900 INJECTION, SOLUTION INTRAVENOUS at 16:10

## 2020-03-13 RX ADMIN — FAMOTIDINE 20 MG: 20 TABLET ORAL at 08:51

## 2020-03-13 RX ADMIN — METHOCARBAMOL TABLETS 500 MG: 500 TABLET, COATED ORAL at 07:06

## 2020-03-13 RX ADMIN — FAMOTIDINE 20 MG: 20 TABLET ORAL at 20:06

## 2020-03-13 RX ADMIN — LOSARTAN POTASSIUM 100 MG: 100 TABLET, FILM COATED ORAL at 20:06

## 2020-03-13 RX ADMIN — METOPROLOL TARTRATE 50 MG: 50 TABLET, FILM COATED ORAL at 20:06

## 2020-03-13 RX ADMIN — METOPROLOL TARTRATE 50 MG: 50 TABLET, FILM COATED ORAL at 09:28

## 2020-03-13 RX ADMIN — TRAMADOL HYDROCHLORIDE 50 MG: 50 TABLET, FILM COATED ORAL at 18:31

## 2020-03-13 RX ADMIN — DOCUSATE SODIUM 100 MG: 100 CAPSULE, LIQUID FILLED ORAL at 20:06

## 2020-03-13 RX ADMIN — ACETAMINOPHEN 975 MG: 325 TABLET, FILM COATED ORAL at 08:51

## 2020-03-13 RX ADMIN — TRAMADOL HYDROCHLORIDE 50 MG: 50 TABLET, FILM COATED ORAL at 11:03

## 2020-03-13 RX ADMIN — CLINDAMYCIN PHOSPHATE 900 MG: 900 INJECTION, SOLUTION INTRAVENOUS at 09:25

## 2020-03-13 ASSESSMENT — ACTIVITIES OF DAILY LIVING (ADL)
ADLS_ACUITY_SCORE: 13
ADLS_ACUITY_SCORE: 11
ADLS_ACUITY_SCORE: 13
ADLS_ACUITY_SCORE: 11

## 2020-03-13 NOTE — PLAN OF CARE
Patient alert and oriented  Peripheral IV saline locked  PCA stopped, pain controlled with PRN tylenol and tramadol- IV dilaudid available for breakthrough pain  IVF stopped, tolerating diet with no nausea  Lungs clear  Positive BS, passing gas- no BM this shift, last was the 11th  Voiding adequate amounts post catheter removal  Up standby assist with walker/gait belt and brace  Bilateral LUCINDA drains in place  Dressing CDI  Possible discharge later today, but orders for LUCINDA removal are for tomorrow or less than 30 mL per shift- output >30 this shift  Will go home with wife when stable  Continue with plan of care

## 2020-03-13 NOTE — PROGRESS NOTES
Declined further review of spinal prec with mobility skills.     Physical Therapy Discharge Summary     Reason for therapy discharge:    All goals and outcomes met, no further needs identified.     Progress towards therapy goal(s). See goals on Care Plan in [facility] electronic health record for goal details.  Goals met     Therapy recommendation(s):    Continue home exercise program.

## 2020-03-13 NOTE — PLAN OF CARE
PT: PT eval completed. Pt is status post lumbar fusion. Pt lives with spouse in split level home with 2 stairs to enter.   Discharge Planner PT   Patient plan for discharge: home with spouse  Current status: Pt educated on PT role and POC. Pt was educated on spinal precautions, role of brace with OOB activity. Pt and spouse educated on activity modifications (squating instead of bending, moving frequently used items to counter height, showering with frequently used items at arm level). Pt educated on fitting and adjusting brace, tips for donning/doffing in mirror as needed.  Pt was cued for supine to sit following spine precautions using log rolling. Pt needing CGA for performance. Pt was educated on how to perform sit<>Stand with hand placement and technique with FWW, SBA, fading to no assistive device. Cues for better performance of spine precautions to avoid bending. Pt was educated on use of walker with ambulation, cues for step progression. Pt was educated on posture, trip hazards and visual scanning. SBA fading to no assistive device. 100 feet.  Pt was able to perform stairs with use of rail then with SEC only as no rail for 2 stairs. CGA. Spouse present to understand how to provide PRN assist on stairs.  Barriers to return to prior living situation: none  Recommendations for discharge: home with spouse  Rationale for recommendations: Pt likely able to meet PT goals with continued IP PT, likely after pm session. Needs to practice log rolling again, otherwise meeting goals. Pt has good support from spouse at D/C. Pt would benefit from continued PT in ip setting to progress to independence with mobility skills.

## 2020-03-13 NOTE — PLAN OF CARE
Vss. Low grade temp max 99.3. Lungs clr-O2 2L mid 90s. Is done. Hypoactive bs/no gas, no nausea. Tolerating diet. Last bm 03/11/20. Angela graham'd at 0700-pt dtv from this time-information given to receiving Day shift RN. Bg locked. Drsg-cdi. Cms- (baseline) numbness bles. Miguel x2-patent. Pain managed with Pca Dilaudid/Tylenol. Skin dry/flaky, rough soles, & few bruises. Tolerating ice & repositioning. No other significant issues noted overnight.

## 2020-03-13 NOTE — PROGRESS NOTES
03/13/20 0936   Quick Adds   Type of Visit Initial PT Evaluation   Living Environment   Lives With spouse   Living Arrangements house   Home Accessibility stairs to enter home;stairs within home   Number of Stairs, Main Entrance 2   Number of Stairs, Within Home, Primary 8   Stair Railings, Within Home, Primary railings safe and in good condition   Self-Care   Usual Activity Tolerance good   Current Activity Tolerance moderate   Regular Exercise Yes   Activity/Exercise Type other (see comments)  (elliptical )   Exercise Amount/Frequency daily   Equipment Currently Used at Home shower chair;cane, straight   Functional Level Prior   Ambulation 0-->independent   Transferring 0-->independent   Toileting 0-->independent   Bathing 0-->independent   Communication 0-->understands/communicates without difficulty   Swallowing 0-->swallows foods/liquids without difficulty   Cognition 0 - no cognition issues reported   Fall history within last six months no   General Information   Onset of Illness/Injury or Date of Surgery - Date 03/12/20   Referring Physician Dr. Lundberg   Patient/Family Goals Statement Pt is hoping to DC home today   Pertinent History of Current Problem (include personal factors and/or comorbidities that impact the POC) Pt is status post lumbar surgery   Precautions/Limitations spinal precautions   Cognitive Status Examination   Orientation orientation to person, place and time   Level of Consciousness alert   Follows Commands and Answers Questions 100% of the time   Personal Safety and Judgment intact   Memory intact   Pain Assessment   Patient Currently in Pain Yes, see Vital Sign flowsheet   Posture    Posture Forward head position  (mild lumbar flexoin)   Range of Motion (ROM)   ROM Comment spinal prec, but otherwise WFL   Strength   Strength Comments functional weakness, but able to perform LAQ   Bed Mobility   Bed Mobility Comments CGA   Transfer Skills   Transfer Comments CGA   Gait   Gait Comments  "SBA initially with FWW   Balance   Balance Comments no LOB, increased trunk sway to the R, no pain reported in LEs, likely due to L hip/core weakness   Sensory Examination   Sensory Perception Comments Some prior numbness in feet   Modality Interventions   Planned Modality Interventions Cryotherapy   General Therapy Interventions   Planned Therapy Interventions bed mobility training;gait training;strengthening;transfer training;risk factor education;home program guidelines;progressive activity/exercise   Clinical Impression   Criteria for Skilled Therapeutic Intervention yes, treatment indicated   PT Diagnosis decreased functional mobility status post lumbar fusion   Influenced by the following impairments decreased ROM due to prec, decreased functional strength, pain   Functional limitations due to impairments decreased bed mob, transfers, ambulation   Clinical Presentation Stable/Uncomplicated   Clinical Presentation Rationale improving   Clinical Decision Making (Complexity) Low complexity   Therapy Frequency 2x/day   Predicted Duration of Therapy Intervention (days/wks) 1 day   Anticipated Discharge Disposition Home with Assist   Risk & Benefits of therapy have been explained Yes   Patient, Family & other staff in agreement with plan of care Yes   [facility] Funding [facility] TM \"6 Clicks\"   2016, Trustees of [facility], under license to [employer].  All rights reserved.   6 Clicks Short Forms Basic Mobility Inpatient Short Form   [facility]  \"6 Clicks\" V.2 Basic Mobility Inpatient Short Form   1. Turning from your back to your side while in a flat bed without using bedrails? 3 - A Little   2. Moving from lying on your back to sitting on the side of a flat bed without using bedrails? 3 - A Little   3. Moving to and from a bed to a chair (including a wheelchair)? 3 - A Little   4. Standing up from a chair using your arms (e.g., wheelchair, or bedside chair)? 3 - A Little   5. To walk in " hospital room? 3 - A Little   6. Climbing 3-5 steps with a railing? 3 - A Little   Basic Mobility Raw Score (Score out of 24.Lower scores equate to lower levels of function) 18   Total Evaluation Time   Total Evaluation Time (Minutes) 10

## 2020-03-13 NOTE — PROGRESS NOTES
"SPIRITUAL HEALTH SERVICES Progress Note  [facility] Orth[facility]    Visited with pt Sandor per request for hospital . Pt welcomed this author in but stated \"I don't remember asking for a  but I have no problem talking to ya!\" Oriented pt to [facility]. Pt shared about his recent back surgery and that he finds support in his wife. Pt has four kids and 10 grandkids and enjoys time with them. Pt identified [employer] in [place] as his worshipping community and pt shared that he \"reads his bible everyday.\"     Pt expects to discharge today or tomorrow and looks forward to being home. Pt denies any additional needs at this time. Informed pt how to make further requests for  support. [facility] remains available per pt/family request.    Radha Gates   Intern    "

## 2020-03-13 NOTE — PLAN OF CARE
PT- attempted session he declined need for further PT while here in hospital is hoping for LUCINDA removal and discharging  later today. Will recommend to PT for discharge per his request.

## 2020-03-13 NOTE — PLAN OF CARE
Discharge Planner OT   Patient plan for discharge: home with spouse  Current status: After skilled intervention pt is completing lower body dressing, toileting, toilet transfer, and upper body dressing with supervision. Pt has excellent safety awareness, functional activity tolerance, good adherence to spine precautions, and understanding of adaptive equipment recommendations. No further OT services indicated.   Barriers to return to prior living situation: none  Recommendations for discharge: Home with supervision from spouse for bathing and assist with strenuous IADLs. Recommend use of shower grab bars, sock aid, reacher, long handled bath sponge, toilet aid tongs  Rationale for recommendations: No further skilled OT services indicated       Entered by: Kalie Huerta 03/13/2020 10:48 AM       Occupational Therapy Discharge Summary    Reason for therapy discharge:    All goals and outcomes met, no further needs identified.    Progress towards therapy goal(s). See goals on Care Plan in [facility] electronic health record for goal details.  Goals met    Therapy recommendation(s):    No further therapy is recommended.

## 2020-03-13 NOTE — PROGRESS NOTES
03/13/20 1000   Quick Adds   Type of Visit Initial Occupational Therapy Evaluation   Living Environment   Lives With spouse   Living Arrangements house   Transportation Anticipated car, drives self;family or friend will provide   Self-Care   Usual Activity Tolerance good   Current Activity Tolerance moderate   Equipment Currently Used at Home raised toilet  (reacher)   Functional Level   Ambulation 0-->independent   Transferring 0-->independent   Toileting 0-->independent   Bathing 0-->independent   Dressing 0-->independent   Eating 0-->independent   Fall history within last six months no   General Information   Onset of Illness/Injury or Date of Surgery - Date 03/12/20   Referring Physician Jose Lundberg MD    Patient/Family Goals Statement return home   Additional Occupational Profile Info/Pertinent History of Current Problem Pt is status post lumbar surgery   Precautions/Limitations fall precautions;spinal precautions; brace when OOB   Cognitive Status Examination   Orientation orientation to person, place and time;person   Level of Consciousness alert   Follows Commands (Cognition) WFL   Memory intact   Attention No deficits were identified   Organization/Problem Solving No deficits were identified   Executive Function No deficits were identified   Pain Assessment   Patient Currently in Pain Yes, see Vital Sign flowsheet   Range of Motion (ROM)   ROM Comment baseline impaired shoulder ROM   Transfer Skill: Sit to Stand   Level of [place]: Sit/Stand independent   Transfer Skill: Toilet Transfer   Level of [place]: Toilet minimum assist (75% patients effort)   Upper Body Dressing   Level of [place]: Dress Upper Body independent   Lower Body Dressing   Level of [place]: Dress Lower Body moderate assist (50% patients effort)   Instrumental Activities of Daily Living (IADL)   Previous Responsibilities meal prep;housekeeping;laundry;shopping;yardwork;finances;driving;medication  "management;work   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   Activities of Daily Living Analysis   Impairments Contributing to Impaired Activities of Daily Living post surgical precautions   General Therapy Interventions   Planned Therapy Interventions ADL retraining   Clinical Impression   Criteria for Skilled Therapeutic Interventions Met yes, treatment indicated   OT Diagnosis decline function   Influenced by the following impairments post surgical precautions   Assessment of Occupational Performance 5 or more Performance Deficits   Identified Performance Deficits dressing, toileting, bathing, cooking, cleaning, working   Clinical Decision Making (Complexity) Low complexity   Therapy Frequency   (one time)   Predicted Duration of Therapy Intervention (days/wks)   (one time session)   Anticipated Equipment Needs at Discharge bath sponge;long shoe horn;reacher;sock aide;raised toilet seat  (toilet hygiene aid)   Anticipated Discharge Disposition Home with Assist   Risks and Benefits of Treatment have been explained. Yes   Patient, Family & other staff in agreement with plan of care Yes   Clinical Impression Comments Pt functioning below baseline and will benefit from continued skilled OT   [facility] TM \"6 Clicks\"   2016, Trustees of [facility], under license to [employer].  All rights reserved.   6 Clicks Short Forms Daily Activity Inpatient Short Form   [facility]  \"6 Clicks\" Daily Activity Inpatient Short Form   1. Putting on and taking off regular lower body clothing? 3 - A Little   2. Bathing (including washing, rinsing, drying)? 3 - A Little   3. Toileting, which includes using toilet, bedpan or urinal? 4 - None   4. Putting on and taking off regular upper body clothing? 4 - None   5. Taking care of personal grooming such as brushing teeth? 4 - None   6. Eating meals? 4 - None   Daily Activity Raw Score (Score out of 24.Lower scores equate to lower levels of " function) 22   Total Evaluation Time   Total Evaluation Time (Minutes) 8

## 2020-03-13 NOTE — PROGRESS NOTES
"[facility]  Neurosurgery Progress Note  Jose Lundberg MD       1 Day Post-Op  Procedure(s):  Lumbar 3-5 Decompression and Fusion    Interval history: Doing well. Has been ambulating in halls and feels better      Vital signs:   Blood pressure 124/59, pulse 70, temperature 98.8  F (37.1  C), temperature source Oral, resp. rate 16, height 1.778 m (5' 10\"), weight 92.5 kg (204 lb), SpO2 94 %.    Date 03/13/20 0700 - 03/14/20 0659   Shift 6375-0773 4387-0870 0162-5057 24 Hour Total   INTAKE   P.O. 240   240   Shift Total(mL/kg) 240(2.59)   240(2.59)   OUTPUT   Urine 300   300   Shift Total(mL/kg) 300(3.24)   300(3.24)   Weight (kg) 92.53 92.53 92.53 92.53       Exam:   Stable        Active Problems:    S/P lumbar fusion      Plan:    Doing well  Discontinue PCA  Mobilize  LUCINDA drains out when output less than 30 ml over shift  Home Saturday or Sunday      Jose Lundberg MD, MS, FAANS  [facility]  [phone] (office)  [phone] (Care coordinator)    "

## 2020-03-13 NOTE — PROGRESS NOTES
[facility]    Hospitalist Consult Progress Note      Assessment & Plan   69-year-old man with history of coronary artery disease, hypertension, back pain and lumbar radiculopathy admitted for decompression and fusion done by Dr. Lundberg.    Hospital medicine was consulted postop for medical management.  Evaluation is only concerning for an episode of 2 weeks ago of chest discomfort lasted around 30 to 45 minutes and relieved by nitroglycerin.  This did not recur, was not associated with any other symptom or shortness of breath.  Does not get angina on a regular basis and this was very unusual for him however he did not mention it to anyone.  His EKG was done on February 20 prior to this episode for preop.    #Lumbar decompression and fusion: Postoperative management, pain management and diet advancement per surgery.    #Coronary artery disease and history of CABG in 2009, recent episode of chest discomfort relieved by nitroglycerin 2 weeks ago, does not get angina on a regular basis.  EKG obtained on 3/12 for baseline shows Q waves in the inferior leads but no acute ST segment changes.  He does not have any active chest pain.  -We will continue metoprolol, losartan.  Patient likely should be on aspirin and statin but patient refuses.  Benefits of these medications was discussed today.    #Hypertension on metoprolol and losartan.  Decently controlled while here.  Continue    Defer DVT prophylaxis to primary team  CODE STATUS full code  Thank you for the consultation we will follow-up with you while patient is here    Tiago Stout MD  Text Page    Interval History   Assumed care this a.m.  He denies any chest pain, shortness of breath, fevers or chills, lightheadedness.  He was able to ambulate the halls..  Pain is under decent control.    -Data reviewed today: I reviewed all new labs and imaging results over the last 24 hours.     Physical Exam   Temp: 98.8  F (37.1  C) Temp src: Oral BP: 124/59 Pulse: 70  Heart Rate: 66 Resp: 16 SpO2: 94 % O2 Device: Nasal cannula Oxygen Delivery: 2 LPM  Vitals:    03/10/20 1000 03/12/20 0627   Weight: 93 kg (205 lb) 92.5 kg (204 lb)     Vital Signs with Ranges  Temp:  [98.8  F (37.1  C)-99.6  F (37.6  C)] 98.8  F (37.1  C)  Pulse:  [66-70] 70  Heart Rate:  [61-77] 66  Resp:  [8-16] 16  BP: ()/(51-74) 124/59  FiO2 (%):  [100 %] 100 %  SpO2:  [90 %-98 %] 94 %  I/O last 3 completed shifts:  In: 4013 [P.O.:820; I.V.:3193]  Out: 2770 [Urine:2600; Drains:170]    GENERAL:  Comfortable.  PSYCH: pleasant, oriented, No acute distress.  EYES: PERRLA, Normal conjunctiva.  HEART:  Normal S1, S2 with no edema.  LUNGS:  Clear to auscultation, normal Respiratory effort.  ABDOMEN:  Soft, no hepatosplenomegaly, normal bowel sounds.  SKIN:  Dry to touch, No rash.  Neuro: Non focal with normal motor power, CN's and Reflexes.    Medications     0.9% sodium chloride + KCl 20 mEq/L 100 mL/hr at 03/13/20 0953     HYDROmorphone         acetaminophen  975 mg Oral Q8H     clindamycin  900 mg Intravenous Q8H     diazepam  2.5 mg Intravenous Once     docusate sodium  100 mg Oral BID     famotidine  20 mg Oral BID    Or     famotidine  20 mg Intravenous BID     losartan  100 mg Oral Daily     metoprolol tartrate  50 mg Oral BID     sodium chloride (PF)  3 mL Intracatheter Q8H       Data   Recent Labs   Lab 03/13/20  0553   HGB 11.0*      POTASSIUM 4.4   CHLORIDE 106   CO2 29   BUN 15   CR 0.72   ANIONGAP 5   BROOK 8.5   *       No results found for this or any previous visit (from the past 24 hour(s)).

## 2020-03-13 NOTE — PLAN OF CARE
Pain controlled with dilaudid pca & scheduled meds. Up with 2 assist, brace & walker to walk in germain- toll well. Tolerated regular diet. Cloudy urine output per finley adequate amt. Drsg CD&I. Baseline numbness BLE.

## 2020-03-14 VITALS
BODY MASS INDEX: 29.2 KG/M2 | SYSTOLIC BLOOD PRESSURE: 122 MMHG | OXYGEN SATURATION: 95 % | HEART RATE: 54 BPM | HEIGHT: 70 IN | RESPIRATION RATE: 20 BRPM | DIASTOLIC BLOOD PRESSURE: 70 MMHG | WEIGHT: 204 LBS | TEMPERATURE: 98.1 F

## 2020-03-14 LAB — HGB BLD-MCNC: 12 G/DL (ref 13.3–17.7)

## 2020-03-14 PROCEDURE — 25000132 ZZH RX MED GY IP 250 OP 250 PS 637: Performed by: NEUROLOGICAL SURGERY

## 2020-03-14 PROCEDURE — 99231 SBSQ HOSP IP/OBS SF/LOW 25: CPT | Performed by: HOSPITALIST

## 2020-03-14 PROCEDURE — 25000125 ZZHC RX 250: Performed by: NEUROLOGICAL SURGERY

## 2020-03-14 PROCEDURE — 36415 COLL VENOUS BLD VENIPUNCTURE: CPT | Performed by: NEUROLOGICAL SURGERY

## 2020-03-14 PROCEDURE — 85018 HEMOGLOBIN: CPT | Performed by: NEUROLOGICAL SURGERY

## 2020-03-14 RX ORDER — TRAMADOL HYDROCHLORIDE 50 MG/1
50 TABLET ORAL EVERY 6 HOURS PRN
Qty: 50 TABLET | Refills: 0 | Status: ON HOLD | OUTPATIENT
Start: 2020-03-14 | End: 2021-09-19

## 2020-03-14 RX ADMIN — ACETAMINOPHEN 975 MG: 325 TABLET, FILM COATED ORAL at 08:31

## 2020-03-14 RX ADMIN — ACETAMINOPHEN 975 MG: 325 TABLET, FILM COATED ORAL at 00:33

## 2020-03-14 RX ADMIN — DOCUSATE SODIUM 100 MG: 100 CAPSULE, LIQUID FILLED ORAL at 08:31

## 2020-03-14 RX ADMIN — FAMOTIDINE 20 MG: 20 TABLET ORAL at 08:31

## 2020-03-14 RX ADMIN — CLINDAMYCIN PHOSPHATE 900 MG: 900 INJECTION, SOLUTION INTRAVENOUS at 08:33

## 2020-03-14 RX ADMIN — CLINDAMYCIN PHOSPHATE 900 MG: 900 INJECTION, SOLUTION INTRAVENOUS at 00:33

## 2020-03-14 RX ADMIN — TRAMADOL HYDROCHLORIDE 50 MG: 50 TABLET, FILM COATED ORAL at 01:48

## 2020-03-14 RX ADMIN — METOPROLOL TARTRATE 50 MG: 50 TABLET, FILM COATED ORAL at 08:31

## 2020-03-14 ASSESSMENT — ACTIVITIES OF DAILY LIVING (ADL)
ADLS_ACUITY_SCORE: 12
ADLS_ACUITY_SCORE: 13

## 2020-03-14 NOTE — DISCHARGE SUMMARY
Patient alert and oriented  Peripheral IV removed  VSS, on room air  Pain controlled  Tolerating diet, denies nausea  Up ambulating without issue, brace in place  LUCINDA drains removed, dressing CDI  Surgical incision dressing in place, CDI- patient educated they may removed tomorrow prior to showering  Educated on LUCINDA site dressing changes  Instructed to call and set up follow up appointment  Discharge medications given to patient  Discharge instructions discussed with patient, wife present, all questions answered  Patient left floor via wheelchair and took all belongings  Wife transporting home

## 2020-03-14 NOTE — PLAN OF CARE
VS-afebrile, stable.   Lung Sounds-clear, no cough, using IS upto 2000,   O2-on room air.   GI-+bs, +flatus, lbm was 11th. Taking colace. Tolerating reg diet. Denies nausea. Had stirfry.   -voiding adequately.   IVF-sl iv . On cleocin.   Dressings-c/d/I. 2 LUCINDA's.   CMS-denies numbness and tinging. +pp,  strong dorsi/planter , scds on while in bed. Warm.   Drain-2 LUCINDA, L 10cc, R 25cc.   Activity-up in chair for supper, walking to bathroom to void, walked in 3 hallways x 1 this shift.   Pain-rates pain level a 5 down to a 4. Taking ultram.   D/C Plan-home with wife when able. Possible Saturday or Sunday depending on drain output.

## 2020-03-14 NOTE — PROGRESS NOTES
"[facility]  Orthopedics Progress Note       S:  POD # 2 s/p L3-4 and L4-5 decompressive laminectomies with medial facetectomies with decompression and exposure of the L3, L4 and L5 roots bilaterally  Placement of Nuvasive Reline traction pedicle screws bilaterally from L3-5  L3-5 posterior lateral fusion with placement of locally harvested autologous bone      O:  Blood pressure 129/68, pulse 58, temperature 98.3  F (36.8  C), temperature source Oral, resp. rate 18, height 1.778 m (5' 10\"), weight 92.5 kg (204 lb), SpO2 95 %.  Lab Results   Component Value Date    HGB 12.0 03/14/2020       Patient is sitting at the edge of the bed, he is in no acute distress.  His dressing is clean dry and intact.  He is able to lift his legs, bend the knees and wiggle the toes without difficulty.  Neurovascular exam is intact.  His legs are soft and nontender to palpation.      A/P:  POD # 1 s/p L3-4 and L4-5 decompressive laminectomies with medial facetectomies with decompression and exposure of the L3, L4 and L5 roots bilaterally  Placement of Nuvasive Reline traction pedicle screws bilaterally from L3-5  L3-5 posterior lateral fusion with placement of locally harvested autologous bone  Oral medication as needed for pain  Patient has been cleared by OT and has declined further PT intervention at this time  Compression stockings and PCD's for DVT prophylaxis  LUCINDA drains may be pulled when output is <30, likely this morning  Anticipate patient could discharge home later today  Follow up with ortho as indicated in post op instructions    Álvaro Rodríguez PA-C        "

## 2020-03-14 NOTE — PROGRESS NOTES
[facility]    Hospitalist Progress Note      Assessment & Plan   69-year-old man with history of coronary artery disease, hypertension, back pain and lumbar radiculopathy admitted for decompression and fusion done by Dr. Lundberg.    Hospital medicine was consulted postop for medical management.  Evaluation is only concerning for an episode of 2 weeks ago of chest discomfort lasted around 30 to 45 minutes and relieved by nitroglycerin.  This did not recur, was not associated with any other symptom or shortness of breath.  Does not get angina on a regular basis and this was very unusual for him however he did not mention it to anyone.  His EKG was done on February 20 prior to this episode for preop.     #Lumbar decompression and fusion: He is now postop day #2.  Postoperative management, pain management and diet advancement per surgery.     #Coronary artery disease and history of CABG in 2009, recent episode of chest discomfort relieved by nitroglycerin 2 weeks ago, does not get angina on a regular basis.  EKG obtained on 3/12 for baseline shows Q waves in the inferior leads but no acute ST segment changes.  He does not have any active chest pain.  -We will continue metoprolol, losartan.  Patient likely should be on aspirin and statin but patient refuses.  Benefits of these medications was discussed with him.      #Hypertension on metoprolol and losartan.  Decently controlled while here. Should continue on discharge     Defer DVT prophylaxis to primary team  CODE STATUS full code    Understand that plan is for patient to be discharged later today.  Medication reconciliation was already completed.    Tiago Stout MD  Text Page    Interval History   No acute events overnight.  Patient states he is anxious to go home.  He denies any chest pain, shortness of breath, fevers or chills.  No nausea or emesis.  He was able to ambulate with physical therapy.    -Data reviewed today: I reviewed all new labs and  imaging results over the last 24 hours.    Physical Exam   Temp: 97.9  F (36.6  C) Temp src: Temporal BP: 123/68 Pulse: 60 Heart Rate: 60 Resp: 20 SpO2: 96 % O2 Device: None (Room air)    Vitals:    03/10/20 1000 03/12/20 0627   Weight: 93 kg (205 lb) 92.5 kg (204 lb)     Vital Signs with Ranges  Temp:  [97.2  F (36.2  C)-98.3  F (36.8  C)] 97.9  F (36.6  C)  Pulse:  [58-60] 60  Heart Rate:  [60-68] 60  Resp:  [16-20] 20  BP: (111-137)/(61-68) 123/68  SpO2:  [92 %-96 %] 96 %  I/O last 3 completed shifts:  In: 407.7 [P.O.:340; I.V.:67.7]  Out: 850 [Urine:700; Drains:150]    GENERAL:  Comfortable.  PSYCH: pleasant, oriented, No acute distress.  EYES: PERRLA, Normal conjunctiva.  HEART:  Normal S1, S2 with no edema.  LUNGS:  Clear to auscultation, normal Respiratory effort.  ABDOMEN:  Soft, no hepatosplenomegaly, normal bowel sounds.  SKIN:  Dry to touch, No rash.  Neuro: Non focal with normal motor power, CN's and Reflexes.    Medications     0.9% sodium chloride + KCl 20 mEq/L Stopped (03/13/20 1144)     HYDROmorphone Stopped (03/13/20 1144)       acetaminophen  975 mg Oral Q8H     clindamycin  900 mg Intravenous Q8H     diazepam  2.5 mg Intravenous Once     docusate sodium  100 mg Oral BID     famotidine  20 mg Oral BID    Or     famotidine  20 mg Intravenous BID     losartan  100 mg Oral Daily     metoprolol tartrate  50 mg Oral BID     sodium chloride (PF)  3 mL Intracatheter Q8H       Data   Recent Labs   Lab 03/14/20  0613 03/13/20  0553   HGB 12.0* 11.0*   NA  --  140   POTASSIUM  --  4.4   CHLORIDE  --  106   CO2  --  29   BUN  --  15   CR  --  0.72   ANIONGAP  --  5   BROOK  --  8.5   GLC  --  124*       No results found for this or any previous visit (from the past 24 hour(s)).

## 2020-03-14 NOTE — DISCHARGE INSTRUCTIONS
Tube site dressing changes- daily until scabbed over then can leave open to air    1. Wash hands  2. Don gloves  3. Remove dressing  4. Wash hands  5. Open new dressing materials- gauze and tagaderm  6. Don clean gloves  7. Fold gauze twice over and apply to site, cover with tagaderm

## 2020-03-18 NOTE — DISCHARGE SUMMARY
[facility] Discharge Summary    Lokesh Shipley Jr. MRN# [id number]   Age: 69 year old YOB: 1950     Date of Admission:  3/12/2020  Date of Discharge::  3/14/2020  1:43 PM   Admitting Physician:  Jose Lundberg MD  Discharge Physician:  Jose Lundberg MD    Home clinic: [facility]          Admission Diagnoses:   Spinal stenosis, lumbar region, with neurogenic claudication [M48.062]  Lumbar radiculopathy [M54.16]          Discharge Diagnosis:     Patient Active Problem List   Diagnosis     Fall (on)(from) incline, initial encounter     S/P lumbar fusion             Procedures:   Procedure(s):  L3-4 and L4-5 decompressive laminectomies with medial facetectomies with decompression and exposure of the L3, L4 and L5 roots bilaterally  Placement of Nuvasive Reline traction pedicle screws bilaterally from L3-5  L3-5 posterior lateral fusion with placement of locally harvested autologous bone          Medications Prior to Admission:     No medications prior to admission.             Discharge Medications:     Discharge Medication List as of 3/14/2020 12:25 PM      START taking these medications    Details   methocarbamol (ROBAXIN) 500 MG tablet Take 1 tablet (500 mg) by mouth every 8 hours as needed for muscle spasms, Disp-40 tablet,R-0, Local Print      traMADol (ULTRAM) 50 MG tablet Take 1 tablet (50 mg) by mouth every 6 hours as needed for moderate to severe pain, Disp-50 tablet,R-0, Local Print         CONTINUE these medications which have NOT CHANGED    Details   acetaminophen (TYLENOL) 500 MG tablet Take 500-1,000 mg by mouth every 6 hours as needed for mild pain, Historical      metoprolol (LOPRESSOR) 50 MG tablet Take 1 tablet (50 mg) by mouth 2 times daily, Disp-180 tablet, R-3, E-Prescribe      multivitamin w/minerals (MULTI-VITAMIN) tablet Take 1 tablet by mouth daily, Historical      nitroGLYcerin (NITROSTAT) 0.4 MG sublingual tablet Place 0.4  mg under the tongue every 5 minutes as needed for chest pain For chest pain place 1 tablet under the tongue every 5 minutes for 3 doses. If symptoms persist 5 minutes after 1st dose call 911., Historical      olmesartan (BENICAR) 40 MG tablet Take 20 mg by mouth daily , Historical      tadalafil (CIALIS) 10 MG tablet 1 tab po prn as directed, Disp-9 tablet, R-0, E-PrescribePatient is aware that he needs to make an appointment for an office visit  to obtain future refills.         STOP taking these medications       mupirocin (BACTROBAN) 2 % nasal ointment Comments:   Reason for Stopping:         oxyCODONE (ROXICODONE) 5 MG tablet Comments:   Reason for Stopping:                     Consultations:   PT  OT  Hospitalist           Brief History of Illness:    Lokesh Shipley Jr. is a 69 year old male that is 5 Days Post-Op s/p Procedure(s):  L3-4 and L4-5 decompressive laminectomies with medial facetectomies with decompression and exposure of the L3, L4 and L5 roots bilaterally  Placement of Nuvasive Reline traction pedicle screws bilaterally from L3-5  L3-5 posterior lateral fusion with placement of locally harvested autologous bone.           Hospital Course:   The patient was admitted to the floor following surgery and did well with PT and OT. Ambuation improved and pain was controlled. Pt was found stable for discharge.           Discharge Instructions and Follow-Up:     Discharge diet: Regular normal diet   Discharge activity: Lifting restricted to 10 pounds until follow up  No lifting or strenuous exercise for 6 week(s)  No heavy lifting, pushing, pulling for 6 week(s)  Do not submerge your incision underwater as in hot tub, pool or lake water for 6 weeks. Ok to take showers and bathe in water below your incision.   Discharge follow-up:   Schedule Neurosurgery follow up appointment with either Ada Espinoza CNP and Dr. Jose Lundberg at [facility] by calling [phone] for the Care  Coordinator Verna or [phone] for [facility] general number in 4-6 weeks.    If sutures or staples were placed, please schedule an appointment for wound check and staple/suture removal in 10-14 days by calling [phone] for the Care Coordinator Verna or by calling the general [facility] line at [phone].            Discharge Disposition:     Discharged to home      Attestation:  I have reviewed today's vital signs, notes, medications, labs and imaging.  Amount of time performed on this discharge summary: 10 minutes.    Jose Lundberg MD

## 2021-09-19 ENCOUNTER — HOSPITAL ENCOUNTER (INPATIENT)
Facility: CLINIC | Age: 71
LOS: 1 days | Discharge: HOME | DRG: 184 | End: 2021-09-20
Attending: EMERGENCY MEDICINE | Admitting: SURGERY
Payer: COMMERCIAL

## 2021-09-19 ENCOUNTER — ANCILLARY PROCEDURE (OUTPATIENT)
Dept: ULTRASOUND IMAGING | Facility: CLINIC | Age: 71
End: 2021-09-19
Attending: EMERGENCY MEDICINE
Payer: COMMERCIAL

## 2021-09-19 ENCOUNTER — APPOINTMENT (OUTPATIENT)
Dept: CT IMAGING | Facility: CLINIC | Age: 71
DRG: 184 | End: 2021-09-19
Attending: EMERGENCY MEDICINE
Payer: COMMERCIAL

## 2021-09-19 ENCOUNTER — APPOINTMENT (OUTPATIENT)
Dept: GENERAL RADIOLOGY | Facility: CLINIC | Age: 71
DRG: 184 | End: 2021-09-19
Attending: EMERGENCY MEDICINE
Payer: COMMERCIAL
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
## 2021-09-19 DIAGNOSIS — S22.41XA CLOSED FRACTURE OF MULTIPLE RIBS OF RIGHT SIDE, INITIAL ENCOUNTER: ICD-10-CM

## 2021-09-19 DIAGNOSIS — S27.0XXA TRAUMATIC PNEUMOTHORAX, INITIAL ENCOUNTER: ICD-10-CM

## 2021-09-19 DIAGNOSIS — E27.9 ADRENAL ABNORMALITY (H): ICD-10-CM

## 2021-09-19 DIAGNOSIS — J84.10 LUNG GRANULOMA (H): ICD-10-CM

## 2021-09-19 DIAGNOSIS — M25.511 ACUTE PAIN OF RIGHT SHOULDER: ICD-10-CM

## 2021-09-19 LAB
ANION GAP SERPL CALCULATED.3IONS-SCNC: 9 MMOL/L (ref 3–14)
BASOPHILS # BLD AUTO: 0.1 10E3/UL (ref 0–0.2)
BASOPHILS NFR BLD AUTO: 1 %
BUN SERPL-MCNC: 24 MG/DL (ref 7–30)
CALCIUM SERPL-MCNC: 9.1 MG/DL (ref 8.5–10.1)
CHLORIDE BLD-SCNC: 105 MMOL/L (ref 94–109)
CO2 SERPL-SCNC: 27 MMOL/L (ref 20–32)
CREAT BLD-MCNC: 1.3 MG/DL (ref 0.7–1.3)
CREAT SERPL-MCNC: 1.2 MG/DL (ref 0.66–1.25)
EOSINOPHIL # BLD AUTO: 0.2 10E3/UL (ref 0–0.7)
EOSINOPHIL NFR BLD AUTO: 2 %
ERYTHROCYTE [DISTWIDTH] IN BLOOD BY AUTOMATED COUNT: 12.9 % (ref 10–15)
GFR SERPL CREATININE-BSD FRML MDRD: 56 ML/MIN/1.73M2
GFR SERPL CREATININE-BSD FRML MDRD: 61 ML/MIN/1.73M2
GLUCOSE BLD-MCNC: 135 MG/DL (ref 70–99)
HCT VFR BLD AUTO: 42.4 % (ref 40–53)
HGB BLD-MCNC: 14.1 G/DL (ref 13.3–17.7)
HOLD SPECIMEN: NORMAL
IMM GRANULOCYTES # BLD: 0.1 10E3/UL
IMM GRANULOCYTES NFR BLD: 1 %
INR PPP: 0.92 (ref 0.85–1.15)
LYMPHOCYTES # BLD AUTO: 2.8 10E3/UL (ref 0.8–5.3)
LYMPHOCYTES NFR BLD AUTO: 29 %
MCH RBC QN AUTO: 31.8 PG (ref 26.5–33)
MCHC RBC AUTO-ENTMCNC: 33.3 G/DL (ref 31.5–36.5)
MCV RBC AUTO: 96 FL (ref 78–100)
MONOCYTES # BLD AUTO: 0.7 10E3/UL (ref 0–1.3)
MONOCYTES NFR BLD AUTO: 7 %
NEUTROPHILS # BLD AUTO: 5.9 10E3/UL (ref 1.6–8.3)
NEUTROPHILS NFR BLD AUTO: 60 %
NRBC # BLD AUTO: 0 10E3/UL
NRBC BLD AUTO-RTO: 0 /100
PLATELET # BLD AUTO: 356 10E3/UL (ref 150–450)
POTASSIUM BLD-SCNC: 3.9 MMOL/L (ref 3.4–5.3)
RBC # BLD AUTO: 4.44 10E6/UL (ref 4.4–5.9)
SARS-COV-2 RNA RESP QL NAA+PROBE: NEGATIVE
SODIUM SERPL-SCNC: 141 MMOL/L (ref 133–144)
WBC # BLD AUTO: 9.8 10E3/UL (ref 4–11)

## 2021-09-19 PROCEDURE — 250N000011 HC RX IP 250 OP 636: Performed by: EMERGENCY MEDICINE

## 2021-09-19 PROCEDURE — 96375 TX/PRO/DX INJ NEW DRUG ADDON: CPT

## 2021-09-19 PROCEDURE — 250N000013 HC RX MED GY IP 250 OP 250 PS 637: Performed by: SURGERY

## 2021-09-19 PROCEDURE — 99207 PR CONSULT E&M CHANGED TO INITIAL LEVEL: CPT | Performed by: INTERNAL MEDICINE

## 2021-09-19 PROCEDURE — 76705 ECHO EXAM OF ABDOMEN: CPT

## 2021-09-19 PROCEDURE — 85610 PROTHROMBIN TIME: CPT | Performed by: EMERGENCY MEDICINE

## 2021-09-19 PROCEDURE — 999N000186 HC STATISTIC TRAUMA - NO PRIOR

## 2021-09-19 PROCEDURE — 250N000013 HC RX MED GY IP 250 OP 250 PS 637: Performed by: INTERNAL MEDICINE

## 2021-09-19 PROCEDURE — 80048 BASIC METABOLIC PNL TOTAL CA: CPT | Performed by: EMERGENCY MEDICINE

## 2021-09-19 PROCEDURE — 70450 CT HEAD/BRAIN W/O DYE: CPT

## 2021-09-19 PROCEDURE — 36415 COLL VENOUS BLD VENIPUNCTURE: CPT | Performed by: EMERGENCY MEDICINE

## 2021-09-19 PROCEDURE — 74177 CT ABD & PELVIS W/CONTRAST: CPT

## 2021-09-19 PROCEDURE — 96376 TX/PRO/DX INJ SAME DRUG ADON: CPT

## 2021-09-19 PROCEDURE — 87635 SARS-COV-2 COVID-19 AMP PRB: CPT | Performed by: EMERGENCY MEDICINE

## 2021-09-19 PROCEDURE — 72125 CT NECK SPINE W/O DYE: CPT

## 2021-09-19 PROCEDURE — 99222 1ST HOSP IP/OBS MODERATE 55: CPT | Performed by: INTERNAL MEDICINE

## 2021-09-19 PROCEDURE — 258N000003 HC RX IP 258 OP 636: Performed by: SURGERY

## 2021-09-19 PROCEDURE — 99204 OFFICE O/P NEW MOD 45 MIN: CPT | Performed by: SURGERY

## 2021-09-19 PROCEDURE — 250N000009 HC RX 250: Performed by: EMERGENCY MEDICINE

## 2021-09-19 PROCEDURE — 120N000004 HC R&B MS OVERFLOW

## 2021-09-19 PROCEDURE — 96374 THER/PROPH/DIAG INJ IV PUSH: CPT | Mod: 59

## 2021-09-19 PROCEDURE — 99285 EMERGENCY DEPT VISIT HI MDM: CPT | Mod: 25

## 2021-09-19 PROCEDURE — C9803 HOPD COVID-19 SPEC COLLECT: HCPCS

## 2021-09-19 PROCEDURE — 73030 X-RAY EXAM OF SHOULDER: CPT | Mod: RT

## 2021-09-19 PROCEDURE — 85025 COMPLETE CBC W/AUTO DIFF WBC: CPT | Performed by: EMERGENCY MEDICINE

## 2021-09-19 PROCEDURE — 76604 US EXAM CHEST: CPT

## 2021-09-19 PROCEDURE — 82565 ASSAY OF CREATININE: CPT

## 2021-09-19 RX ORDER — METHOCARBAMOL 500 MG/1
500 TABLET, FILM COATED ORAL EVERY 6 HOURS PRN
Status: DISCONTINUED | OUTPATIENT
Start: 2021-09-19 | End: 2021-09-20 | Stop reason: HOSPADM

## 2021-09-19 RX ORDER — METHOCARBAMOL 500 MG/1
500 TABLET, FILM COATED ORAL EVERY 6 HOURS PRN
Status: DISCONTINUED | OUTPATIENT
Start: 2021-09-19 | End: 2021-09-19

## 2021-09-19 RX ORDER — LIDOCAINE 4 G/G
1 PATCH TOPICAL EVERY 24 HOURS
Status: DISCONTINUED | OUTPATIENT
Start: 2021-09-19 | End: 2021-09-19

## 2021-09-19 RX ORDER — NALOXONE HYDROCHLORIDE 0.4 MG/ML
0.2 INJECTION, SOLUTION INTRAMUSCULAR; INTRAVENOUS; SUBCUTANEOUS
Status: DISCONTINUED | OUTPATIENT
Start: 2021-09-19 | End: 2021-09-20 | Stop reason: HOSPADM

## 2021-09-19 RX ORDER — METOPROLOL TARTRATE 50 MG
50 TABLET ORAL 2 TIMES DAILY
Status: DISCONTINUED | OUTPATIENT
Start: 2021-09-19 | End: 2021-09-20 | Stop reason: HOSPADM

## 2021-09-19 RX ORDER — NALOXONE HYDROCHLORIDE 0.4 MG/ML
0.4 INJECTION, SOLUTION INTRAMUSCULAR; INTRAVENOUS; SUBCUTANEOUS
Status: DISCONTINUED | OUTPATIENT
Start: 2021-09-19 | End: 2021-09-20 | Stop reason: HOSPADM

## 2021-09-19 RX ORDER — ACETAMINOPHEN 325 MG/1
975 TABLET ORAL EVERY 8 HOURS
Status: DISCONTINUED | OUTPATIENT
Start: 2021-09-19 | End: 2021-09-20 | Stop reason: HOSPADM

## 2021-09-19 RX ORDER — GABAPENTIN 300 MG/1
300 CAPSULE ORAL 3 TIMES DAILY
Status: DISCONTINUED | OUTPATIENT
Start: 2021-09-19 | End: 2021-09-20 | Stop reason: HOSPADM

## 2021-09-19 RX ORDER — SODIUM CHLORIDE, SODIUM LACTATE, POTASSIUM CHLORIDE, CALCIUM CHLORIDE 600; 310; 30; 20 MG/100ML; MG/100ML; MG/100ML; MG/100ML
1000 INJECTION, SOLUTION INTRAVENOUS CONTINUOUS
Status: DISCONTINUED | OUTPATIENT
Start: 2021-09-19 | End: 2021-09-20 | Stop reason: HOSPADM

## 2021-09-19 RX ORDER — GABAPENTIN 300 MG/1
300 CAPSULE ORAL 3 TIMES DAILY
Status: DISCONTINUED | OUTPATIENT
Start: 2021-09-19 | End: 2021-09-19

## 2021-09-19 RX ORDER — ACETAMINOPHEN 325 MG/1
975 TABLET ORAL EVERY 8 HOURS
Status: DISCONTINUED | OUTPATIENT
Start: 2021-09-19 | End: 2021-09-19

## 2021-09-19 RX ORDER — MORPHINE SULFATE 4 MG/ML
4 INJECTION, SOLUTION INTRAMUSCULAR; INTRAVENOUS ONCE
Status: COMPLETED | OUTPATIENT
Start: 2021-09-19 | End: 2021-09-19

## 2021-09-19 RX ORDER — LIDOCAINE 4 G/G
1 PATCH TOPICAL EVERY 24 HOURS
Status: DISCONTINUED | OUTPATIENT
Start: 2021-09-19 | End: 2021-09-20 | Stop reason: HOSPADM

## 2021-09-19 RX ORDER — LOSARTAN POTASSIUM 25 MG/1
25 TABLET ORAL DAILY
Status: DISCONTINUED | OUTPATIENT
Start: 2021-09-20 | End: 2021-09-20 | Stop reason: HOSPADM

## 2021-09-19 RX ORDER — OXYCODONE HYDROCHLORIDE 5 MG/1
5-10 TABLET ORAL
Status: DISCONTINUED | OUTPATIENT
Start: 2021-09-19 | End: 2021-09-20 | Stop reason: HOSPADM

## 2021-09-19 RX ORDER — HYDROMORPHONE HYDROCHLORIDE 1 MG/ML
0.5 INJECTION, SOLUTION INTRAMUSCULAR; INTRAVENOUS; SUBCUTANEOUS ONCE
Status: COMPLETED | OUTPATIENT
Start: 2021-09-19 | End: 2021-09-19

## 2021-09-19 RX ORDER — ACETAMINOPHEN 325 MG/1
975 TABLET ORAL 3 TIMES DAILY
Status: DISCONTINUED | OUTPATIENT
Start: 2021-09-19 | End: 2021-09-19

## 2021-09-19 RX ORDER — MORPHINE SULFATE 2 MG/ML
2 INJECTION, SOLUTION INTRAMUSCULAR; INTRAVENOUS ONCE
Status: COMPLETED | OUTPATIENT
Start: 2021-09-19 | End: 2021-09-19

## 2021-09-19 RX ORDER — IOPAMIDOL 755 MG/ML
500 INJECTION, SOLUTION INTRAVASCULAR ONCE
Status: COMPLETED | OUTPATIENT
Start: 2021-09-19 | End: 2021-09-19

## 2021-09-19 RX ORDER — OXYCODONE HYDROCHLORIDE 5 MG/1
5 TABLET ORAL
Status: DISCONTINUED | OUTPATIENT
Start: 2021-09-19 | End: 2021-09-19

## 2021-09-19 RX ADMIN — IOPAMIDOL 100 ML: 755 INJECTION, SOLUTION INTRAVENOUS at 17:55

## 2021-09-19 RX ADMIN — LOSARTAN POTASSIUM 25 MG: 25 TABLET, FILM COATED ORAL at 23:52

## 2021-09-19 RX ADMIN — MORPHINE SULFATE 2 MG: 2 INJECTION, SOLUTION INTRAMUSCULAR; INTRAVENOUS at 18:18

## 2021-09-19 RX ADMIN — ACETAMINOPHEN 975 MG: 325 TABLET, FILM COATED ORAL at 22:54

## 2021-09-19 RX ADMIN — SODIUM CHLORIDE 100 ML: 9 INJECTION, SOLUTION INTRAVENOUS at 17:55

## 2021-09-19 RX ADMIN — LIDOCAINE 1 PATCH: 246 PATCH TOPICAL at 22:54

## 2021-09-19 RX ADMIN — HYDROMORPHONE HYDROCHLORIDE 0.5 MG: 1 INJECTION, SOLUTION INTRAMUSCULAR; INTRAVENOUS; SUBCUTANEOUS at 18:57

## 2021-09-19 RX ADMIN — GABAPENTIN 300 MG: 300 CAPSULE ORAL at 22:54

## 2021-09-19 RX ADMIN — SODIUM CHLORIDE, POTASSIUM CHLORIDE, SODIUM LACTATE AND CALCIUM CHLORIDE 1000 ML: 600; 310; 30; 20 INJECTION, SOLUTION INTRAVENOUS at 22:53

## 2021-09-19 RX ADMIN — METOPROLOL TARTRATE 50 MG: 50 TABLET, FILM COATED ORAL at 23:38

## 2021-09-19 RX ADMIN — MORPHINE SULFATE 4 MG: 4 INJECTION INTRAVENOUS at 17:39

## 2021-09-19 ASSESSMENT — ENCOUNTER SYMPTOMS
ARTHRALGIAS: 1
HEADACHES: 0
NECK PAIN: 0
BACK PAIN: 0
SHORTNESS OF BREATH: 1
FLANK PAIN: 1

## 2021-09-19 ASSESSMENT — MIFFLIN-ST. JEOR
SCORE: 1723.8
SCORE: 1837.66

## 2021-09-19 NOTE — ED TRIAGE NOTES
Pt presents for complaint of a fall off a 7 foot ladder. Pt states he struck a large branch on the way down hitting his right ribs below his armpit. Pt denies any other pains at this time. Expresses shortness of breath. Denies LOC or neck pain. ABC intact, A&Ox4.

## 2021-09-19 NOTE — ED NOTES
"Pt's family requested oxygen be increased due to pt feeling like he \"can't get a full breath.\" Family was informed that his oxygen saturations were at a 100% and increasing his O2 would be counterintuitive. I further explained this is probably due to his trauma he's taken on due to his fall. Pt was left in a position of comfort and RN was notified of pt's family's concern.  "

## 2021-09-19 NOTE — ED NOTES
Bed: ED03  Expected date:   Expected time:   Means of arrival:   Comments:  Deep clean- at 1550 called

## 2021-09-19 NOTE — ED PROVIDER NOTES
History   Chief Complaint:  Fall     HPI History provided by the patient.  Lokesh Shipley Jr. is a 71 year old male with a history of CAD, heart attack, hypertension, chronic pain, and lumbar fusion who presents for evaluation of right flank and shoulder pain after a 7-foot mechanical fall from a ladder about 1 hour prior to arrival. The patient states that he was trimming a tree when a falling branch took the ladder out from underneath him. He states that he hit his right side on a tree limb on the way down. He complains primarily of right flank, lateral chest wall pain, and shortness of breath. He also reports right shoulder pain. He denies hitting his head, loss of consciousness, neck pain, midline spinal tenderness, or other injury to his extremities. He is not anticoagulated. He has not taken anything for pain yet.     Review of Systems   Respiratory: Positive for shortness of breath.    Genitourinary: Positive for flank pain.   Musculoskeletal: Positive for arthralgias (right lateral chest wall, right shoulder). Negative for back pain and neck pain.   Neurological: Negative for syncope and headaches.   All other systems reviewed and are negative.    Allergies:  Pcn [Penicillins]  Sulfa Drugs    Medications:  Tylenol   Robaxin   Lopressor   Nitrostat   Benicar   Cialis   Tramadol   Tadalafil    Past Medical History:    CAD   Heart attack   Hypertension   Hyperlipidemia  Chronic pain   Stented coronary artery   Lumbar fusion   Fall from incline   Lumbar disc disease with radiculopathy   Rotator cuff tear    Past Surgical History:    Angiogram with stent placement   Low back discectomy   CABG x3   Optical tracking system fusion spine posterior lumbar two levels     Social History:  Presents to the ED: via private carwith his wife  PCP: Zahira Morales    Physical Exam     Patient Vitals for the past 24 hrs:   BP Temp Temp src Pulse Resp SpO2 Height Weight   09/19/21 2159 -- 98  F (36.7  C) Oral -- 24 97 %  "1.778 m (5' 10\") 106.6 kg (235 lb 1.6 oz)   09/19/21 2130 124/70 -- -- 83 25 93 % -- --   09/19/21 2115 121/68 -- -- 82 14 96 % -- --   09/19/21 2045 119/72 -- -- 79 13 94 % -- --   09/19/21 2030 129/68 -- -- 81 22 90 % -- --   09/19/21 2000 129/73 -- -- 80 21 92 % -- --   09/19/21 1945 122/67 -- -- 79 19 95 % -- --   09/19/21 1940 -- -- -- 77 11 95 % -- --   09/19/21 1930 124/76 -- -- 71 12 94 % -- --   09/19/21 1915 125/76 -- -- 73 26 97 % -- --   09/19/21 1900 135/71 -- -- 74 17 97 % -- --   09/19/21 1845 128/74 -- -- 72 15 99 % -- --   09/19/21 1830 139/81 -- -- 74 16 98 % -- --   09/19/21 1815 (!) 148/69 -- -- 73 26 97 % -- --   09/19/21 1730 (!) 140/73 -- -- -- -- 91 % -- --   09/19/21 1710 134/71 98.6  F (37  C) -- 67 20 92 % 1.778 m (5' 10\") 95.3 kg (210 lb)       Physical Exam  Primary Survey:  Airway:  Pt conversant and protecting airway  Breathing: Equal BS bilaterally  Circulation: Palpable and symmetrical radial and PT/DP pulses  Disability: GCS: 15.  MADRID  Exposure: Pt's clothing removed and pt examined.     Secondary Survey:  General: Pt is alert and interactive.  GCS: ESpontaneous--open with blinking at baseline  =  4 points, VOriented  =  5 points, MObeys commands for movement  =  6 points: 15  Head: No hematoma or step-off. Midface stable to palpation.  Eyes: Pupils 3 mm bilaterally and reactive to light, EOM full, no proptosis, no conjunctival injection  Ears:  No external auditory canal discharge or bleeding. No hemotympanum.  Nose:No rhinorrhea. No bleeding noted  Mouth:  Atraumatic.  No posterior pharyngeal erythema. No dental trauma.  No malocclusion  Neck:  C-collar is not in place, midline tenderness absent, mild tenderness to right cervical paraspinal musculature  Cardiovascular:  RRR. S1/S2 w/o M/R/G  Respiratory:  CTA bilaterally, no distress. No crepitance.  Abdomen:  BS present, soft, non-tender, non-distended, no guarding or rebound, no overlying skin changes  Musculoskeletal:  Full " PROM of all major joints w/o crepitance or decreased motion except for right shoulder.  Compression of pelvis elicits no pain.  Tenderness to palpation about right lateral chest wall.  No crepitance.  No obvious flail segment.  Back/Neck:  No TTP over midline cervical, thoracic, or lumbar spine, no abrasions, overlying skin changes, or palpable step-offs.  Neurologic:  5/5 UE and LE strength.  See above for pupils.    Skin: No open wounds  Psychiatric:  Mood and Affect normal.  No anxiety.     Emergency Department Course     Imaging:  XR Shoulder Right G/E 3 Views  1.  Mild osteoarthrosis of the AC joint.   2.  Mild osteoarthrosis of the glenohumeral joint.   3.  The humeral head is high-riding and comes in close proximity to the acromial process and there is also a subacromial enthesophyte; these findings would be consistent with chronic impingement and full-thickness rotator cuff tear.   4.  There are multiple right rib fractures, age indeterminate.    Cervical Spine CT without Contrast  1.  No acute fracture identified.   2.  Mild retrolisthesis of C2 on C3, mild anterolisthesis of C4 on C5, and mild anterolisthesis of C7 on T1.   3.  Multilevel degenerative changes, as described.   4.  Moderate/moderate to severe spinal canal stenosis at C5-C6 and C6-C7. Varying degrees of multilevel neural foraminal stenosis, greatest on the right at C6-C7 where it appears severe.    CT Chest/Abdomen/Pelvis with Contrast  1.  New nondisplaced rib fractures right lateral ribs 5-8.   2.  Trace volume presumed pneumothorax posteriorly on the right measuring only 1-2 mm in thickness. This is a fairly focal air collection and could actually represent a scant focus of subpleural air rather than small site of presumed loculated   pneumothorax. No pleural effusion.   3.  No other traumatic abnormalities evident.    Head CT without Contrast  1.  No CT evidence for acute intracranial process.   2.  Brain atrophy and presumed chronic  microvascular ischemic changes as above.    Reading per radiology.    Laboratory:  CBC: WBC: 9.8, HGB: 14.1, PLT: 356    BMP: Glucose 135 (H), o/w WNL (Creatinine: 1.20)  Creatinine POCT: 1.3, eGFR 56 (L)    INR: 0.92    Asymptomatic COVID-19 PCR: Pending     Procedures  FAST (Focused Assessment with Sonography for Trauma) Procedure Note:     PROCEDURE: PERFORMED BY: Dr. Yazan Caputo MD   INDICATIONS/SYMPTOM:  Chest Wall Pain and Abdominal Pain   PROBE: Low frequency convex probe   BODY LOCATION: The ultrasound was performed in the abdominal, subxiphoid and chest areas.   FINDINGS: No evidence of free fluid in hepatorenal (Morison's pouch), perisplenic, or and pelvic areas. No evidence of pericardial effusion.   Extended FAST exam (eFAST):   Images of both lung hemithoracies taken in 2D in multiple rib spaces     Right side: Lung sliding artifact Present   Comet tail artifacts Absent   Left side: Lung sliding artifact Present   Comet tail artifacts Absent     INTERPRETATION: The FAST exam was normal. There was no free fluid present. There was no pericardial effusion.  Sliding lung signs bilaterally   IMAGE DOCUMENTATION: Images were archived to hard drive.     Emergency Department Course:    Reviewed:  I reviewed the patient's nursing notes, vitals and past medical history.     Assessments:  1725 I performed an exam of the patient in room ED03 as documented above.  1729 Partial trauma called.  1857 Patient rechecked.   2025 Patient rechecked and updated. He reports increased pain and is borderline hypoxic. Discussed plan of care including admission.   I updated the patient on results and discussed plan of care.    Consults:    2043 I spoke with Dr. Farmer of the general surgery service regarding patient's presentation, findings, and plan of care. He accepts the patient to his service.    Interventions:  1739 Morphine, 4 mg, IV   1818 Morphine, 2 mg, IV   1857 Dilaudid, 0.5 mg, IV     Disposition:  The  patient was admitted to the hospital under the care of Dr. Farmer.     Impression & Plan     Medical Decision Making:  Lokesh Shipley Jr. is a 71 year old male who presents to the emergency department today for evaluation of a fall. VS on presentation reveal elevated BP, which improved during his emergency department course.  Given the mechanism of injury, partial trauma was activated.  Patient presents primarily with pain/tenderness involving the right lateral chest wall.  Advanced imaging performed given advanced age and mechanism of injury including CT of the head, neck, and chest/abdomen/pelvis.  Bedside ultrasound reveals no evidence of free intra-abdominal fluid, pericardial effusion, or ultrasonographic findings of pneumothorax.  CT head negative for acute fracture nor dislocation.  CT cervical spine is without evidence of acute fracture, though does demonstrate mild retrolisthesis, degenerative changes, and foraminal/spinal stenosis which I do not feel are related to his acute trauma, but rather more chronic in nature.  On reassessment, he has no midline tenderness and demonstrates adequate range of motion to lateral rotation and was able to be cleared from his cervical collar.  CT of the chest abdomen pelvis does demonstrate new nondisplaced rib fractures on the right side, of ribs 5 through 8.  He also has a trace presumed pneumothorax on the right side measuring approximately 1 to 2 mm in thickness though no associated pleural effusion or hemothorax.  No other traumatic injuries identified on remainder of imaging.  I discussed with patient and family incidental findings including granulomatous disease in the adrenal glands as well as lung.  Family can follow-up with her primary care provider regarding these findings though these do not relate to acute traumatic injury.    During patient's emergency department course, he remained quite symptomatic with regards to his rib pain.  There is no evidence  of flail chest.  Pneumothorax does not require chest tube placement.  He did develop mild hypoxia with SPO2 levels in the upper 80s for which I do feel hospital admission is indicated for ensuring adequate pain control, and respiratory monitoring.  Patient and family are updated and in agreement with proposed plan of care.  Patient will be admitted to the general surgery service following his traumatic injury.         Diagnosis:    ICD-10-CM    1. Fall, initial encounter  W19.XXXA    2. Closed fracture of multiple ribs of right side, initial encounter  S22.41XA    3. Traumatic pneumothorax, initial encounter  S27.0XXA    4. Acute pain of right shoulder  M25.511    5. 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    This note was completed in part using Dragon voice recognition software. Although reviewed after completion, some word and grammatical errors may occur.      Yazan Caputo MD  09/19/21 [id number]

## 2021-09-20 ENCOUNTER — ANESTHESIA (OUTPATIENT)
Dept: MEDSURG UNIT | Facility: CLINIC | Age: 71
End: 2021-09-20

## 2021-09-20 ENCOUNTER — APPOINTMENT (OUTPATIENT)
Dept: PHYSICAL THERAPY | Facility: CLINIC | Age: 71
DRG: 184 | End: 2021-09-20
Attending: SURGERY
Payer: COMMERCIAL

## 2021-09-20 ENCOUNTER — APPOINTMENT (OUTPATIENT)
Dept: GENERAL RADIOLOGY | Facility: CLINIC | Age: 71
DRG: 184 | End: 2021-09-20
Attending: SURGERY
Payer: COMMERCIAL

## 2021-09-20 ENCOUNTER — ANESTHESIA EVENT (OUTPATIENT)
Dept: MEDSURG UNIT | Facility: CLINIC | Age: 71
End: 2021-09-20

## 2021-09-20 VITALS
OXYGEN SATURATION: 95 % | DIASTOLIC BLOOD PRESSURE: 67 MMHG | SYSTOLIC BLOOD PRESSURE: 104 MMHG | WEIGHT: 235.1 LBS | TEMPERATURE: 98.7 F | HEIGHT: 70 IN | HEART RATE: 72 BPM | BODY MASS INDEX: 33.66 KG/M2 | RESPIRATION RATE: 18 BRPM

## 2021-09-20 LAB
ANION GAP SERPL CALCULATED.3IONS-SCNC: 3 MMOL/L (ref 3–14)
BUN SERPL-MCNC: 14 MG/DL (ref 7–30)
CALCIUM SERPL-MCNC: 8.3 MG/DL (ref 8.5–10.1)
CHLORIDE BLD-SCNC: 106 MMOL/L (ref 94–109)
CO2 SERPL-SCNC: 27 MMOL/L (ref 20–32)
CREAT SERPL-MCNC: 0.78 MG/DL (ref 0.66–1.25)
DEPRECATED CALCIDIOL+CALCIFEROL SERPL-MC: 63 UG/L (ref 20–75)
ERYTHROCYTE [DISTWIDTH] IN BLOOD BY AUTOMATED COUNT: 13.1 % (ref 10–15)
GFR SERPL CREATININE-BSD FRML MDRD: >90 ML/MIN/1.73M2
GLUCOSE BLD-MCNC: 117 MG/DL (ref 70–99)
HCT VFR BLD AUTO: 36.9 % (ref 40–53)
HGB BLD-MCNC: 12.2 G/DL (ref 13.3–17.7)
MAGNESIUM SERPL-MCNC: 2.2 MG/DL (ref 1.6–2.3)
MAGNESIUM SERPL-MCNC: 2.3 MG/DL (ref 1.6–2.3)
MCH RBC QN AUTO: 31.8 PG (ref 26.5–33)
MCHC RBC AUTO-ENTMCNC: 33.1 G/DL (ref 31.5–36.5)
MCV RBC AUTO: 96 FL (ref 78–100)
PHOSPHATE SERPL-MCNC: 2.7 MG/DL (ref 2.5–4.5)
PLATELET # BLD AUTO: 268 10E3/UL (ref 150–450)
POTASSIUM BLD-SCNC: 4 MMOL/L (ref 3.4–5.3)
PTH-INTACT SERPL-MCNC: 26 PG/ML (ref 18–80)
RBC # BLD AUTO: 3.84 10E6/UL (ref 4.4–5.9)
SODIUM SERPL-SCNC: 136 MMOL/L (ref 133–144)
WBC # BLD AUTO: 9 10E3/UL (ref 4–11)

## 2021-09-20 PROCEDURE — 36415 COLL VENOUS BLD VENIPUNCTURE: CPT | Performed by: SURGERY

## 2021-09-20 PROCEDURE — 83735 ASSAY OF MAGNESIUM: CPT | Performed by: SURGERY

## 2021-09-20 PROCEDURE — 97161 PT EVAL LOW COMPLEX 20 MIN: CPT | Mod: GP

## 2021-09-20 PROCEDURE — 97530 THERAPEUTIC ACTIVITIES: CPT | Mod: GP

## 2021-09-20 PROCEDURE — 80048 BASIC METABOLIC PNL TOTAL CA: CPT | Performed by: SURGERY

## 2021-09-20 PROCEDURE — 71046 X-RAY EXAM CHEST 2 VIEWS: CPT

## 2021-09-20 PROCEDURE — 99232 SBSQ HOSP IP/OBS MODERATE 35: CPT | Performed by: INTERNAL MEDICINE

## 2021-09-20 PROCEDURE — 84100 ASSAY OF PHOSPHORUS: CPT | Performed by: SURGERY

## 2021-09-20 PROCEDURE — 258N000003 HC RX IP 258 OP 636: Performed by: SURGERY

## 2021-09-20 PROCEDURE — 250N000013 HC RX MED GY IP 250 OP 250 PS 637: Performed by: SURGERY

## 2021-09-20 PROCEDURE — 82306 VITAMIN D 25 HYDROXY: CPT | Performed by: SURGERY

## 2021-09-20 PROCEDURE — 83970 ASSAY OF PARATHORMONE: CPT | Performed by: SURGERY

## 2021-09-20 PROCEDURE — 85027 COMPLETE CBC AUTOMATED: CPT | Performed by: SURGERY

## 2021-09-20 PROCEDURE — 999N000111 HC STATISTIC OT IP EVAL DEFER

## 2021-09-20 RX ORDER — OXYCODONE HYDROCHLORIDE 5 MG/1
5-10 TABLET ORAL
Qty: 10 TABLET | Refills: 0 | Status: ON HOLD | OUTPATIENT
Start: 2021-09-20 | End: 2024-02-27

## 2021-09-20 RX ADMIN — GABAPENTIN 300 MG: 300 CAPSULE ORAL at 08:20

## 2021-09-20 RX ADMIN — ACETAMINOPHEN 975 MG: 325 TABLET, FILM COATED ORAL at 13:48

## 2021-09-20 RX ADMIN — SODIUM CHLORIDE, POTASSIUM CHLORIDE, SODIUM LACTATE AND CALCIUM CHLORIDE 1000 ML: 600; 310; 30; 20 INJECTION, SOLUTION INTRAVENOUS at 08:26

## 2021-09-20 RX ADMIN — METHOCARBAMOL 500 MG: 500 TABLET ORAL at 01:48

## 2021-09-20 RX ADMIN — ACETAMINOPHEN 975 MG: 325 TABLET, FILM COATED ORAL at 06:00

## 2021-09-20 RX ADMIN — GABAPENTIN 300 MG: 300 CAPSULE ORAL at 13:48

## 2021-09-20 NOTE — PHARMACY-ADMISSION MEDICATION HISTORY
Admission medication history interview status for this patient is complete. See [facility] admission navigator for allergy information, prior to admission medications and immunization status.     Medication history interview done, indicate source(s): Patient  Medication history resources (including written lists, pill bottles, clinic record):None  Pharmacy: [facility] - [place]    Changes made to PTA medication list:  Added: none  Changed: none  Reported as Not Taking: none  Removed: tramadol (discontinued)    Actions taken by pharmacist (provider contacted, etc):None     Additional medication history information:None    Medication reconciliation/reorder completed by provider prior to medication history?  Y    Prior to Admission medications    Medication Sig Last Dose Taking? Auth Provider   acetaminophen (TYLENOL) 500 MG tablet Take 500-1,000 mg by mouth every 6 hours as needed for mild pain Unknown at Unknown time Yes Reported, Patient   methocarbamol (ROBAXIN) 500 MG tablet Take 1 tablet (500 mg) by mouth every 8 hours as needed for muscle spasms Unknown at Unknown time Yes Ada Espinoza, APRN CNP   metoprolol (LOPRESSOR) 50 MG tablet Take 1 tablet (50 mg) by mouth 2 times daily 9/19/2021 at 0800 Yes Jeramy Mireles MD   multivitamin w/minerals (MULTI-VITAMIN) tablet Take 1 tablet by mouth daily 9/19/2021 at 0800 Yes Reported, Patient   nitroGLYcerin (NITROSTAT) 0.4 MG sublingual tablet Place 0.4 mg under the tongue every 5 minutes as needed for chest pain For chest pain place 1 tablet under the tongue every 5 minutes for 3 doses. If symptoms persist 5 minutes after 1st dose call 911. Unknown at Unknown time Yes Unknown, Entered By History   olmesartan (BENICAR) 40 MG tablet Take 20 mg by mouth daily  9/18/2021 at PM Yes Reported, Patient   tadalafil (CIALIS) 10 MG tablet 1 tab po prn as directed Past Month Yes Jeramy Mireles MD

## 2021-09-20 NOTE — PLAN OF CARE
Patient's After Visit Summary was reviewed with patient and/or spouse.   Patient verbalized understanding of After Visit Summary, recommended follow up and was given an opportunity to ask questions.   Discharge medications sent home with patient/family: No   Discharged with spouse  VSS. Wheelchair ride provided.

## 2021-09-20 NOTE — PLAN OF CARE
"/63 (BP Location: Right arm)   Pulse 56   Temp 98  F (36.7  C) (Oral)   Resp 20   Ht 1.778 m (5' 10\")   Wt 106.6 kg (235 lb 1.6 oz)   SpO2 96%   BMI 33.73 kg/m      Neuro: WDL  Cardiac: WDL  Lungs: RLL diminished. O2 sats adequate on RA. Continuous pulse ox in place.  GI: WDL  : WDL  Skin: Abrasion to right hip/flank area  Pain: 8/10 right sided chest pain down to 6/10 this morning. Scheduled Tylenol, lidocaine patch, prn Robaxin, and ice utilized. Pt declined prn oxycodone.  IV: LR at 100  Labs/tests: CXR- right 5-8 rib fracture and small pneumo. Repeated CXR completed this morning.  Diet: Clears  Activity: x1 and walker  Plan: Pt was admitted under trauma for pain control. Hospitalist, Anesthesia, PT, OT, and Nutrition consulted.      "

## 2021-09-20 NOTE — PROGRESS NOTES
"Vitals: /67 (BP Location: Right arm)   Pulse 72   Temp 98.7  F (37.1  C) (Oral)   Resp 18   Ht 1.778 m (5' 10\")   Wt 106.6 kg (235 lb 1.6 oz)   SpO2 95%   BMI 33.73 kg/m       0700- 1600    Neuro: alert and oriented x4. Able to make needs known.   Cardiac: wdl  Lungs: wdl ex for diminished LS in RLE. Sat stable in RA. IS at bedside and pt has been using properly/ adequately     GI: wdl. No BM today  : wdl  Pain: right rib and chest pain rated 4-5/10. Tylenol, Lidocaine patch, and ice pack in use. Declined Oxycodone.   IV: saline locked  Labs/Imaging: Ca 8.3. Mg 2.3. Chest X-ray from this morning shows no pneumothorax   Diet: regular. Good appetite.  Activity: assist of x1 with a walker and galt belt.  Consult: general surgery, Anesthesiology   Plan: pain control, O2 monitor. discharge this afternoon.     Will continue to monitor.       "

## 2021-09-20 NOTE — PROGRESS NOTES
[facility]    Hospitalist Progress Note  Provider : Liz Arnold MD  Date of Service (when I saw the patient): 09/20/2021    Assessment & Plan   Lokesh Shipley Jr. is a 71 year old male with PMHx CAD with prior MI, HTN, chronic pain, and lumbar fusion who was admitted on 9/19/2021 following a fall from a 7ft ladder 1 hour prior to arrival.   Vitals in ED: Afebrile, HR 67, /71. RR 20, Pulse ox 93% on 2 L. Laboratory workup without significant abn findings. Imaging: Right shoulder XR: multiple rib fractures, multiple chronic findings; CT C/A/P: Nondisplaced rib fractures right lateral ribs 5-8, Presume pneumothorax posteriorly measure 1-2 mm. CT cervical spine no acute findings. CT head showed no acute findings.   Trauma surgery paged for hospital admission with medicine consult for medical management assistance.      Traumatic fall with nondisplaced right lateral rib fractures 5-8; Traumatic pneumothorax   -General surgery primary service - defer management - monitor vitals/oxygen, incentive spirometer, pain control.  Repeat CXR this am showed no evidence of pneumothorax.   -He stated that his pain is improving. Currently on tylenol and gabapentin for pain control.      Hypertension: Continue PTA Losartan and metoprolol      CAD s/p PCI and CABG: No home medications. No cardiac chest pain on admission. Complicates cares.      Obesity BMI 33: Complicates cares     DVT Prophylaxis: Defer to primary service  Code Status: Full Code    Disposition: Expected discharge likely tomorrow per primary    Liz Arnold MD    Interval History   Patient seen and examined. H&P reviewed. He stated that his pain is well controlled. He denies nausea or vomiting. He has no fever. Denies abdominal pain.     -Data reviewed today: I reviewed all new labs and imaging results over the last 24 hours. I personally reviewed    Physical Exam   Temp: 98.3  F (36.8  C) Temp src: Oral BP: 114/65 Pulse:  69   Resp: 18 SpO2: 97 % O2 Device: None (Room air) Oxygen Delivery: 2 LPM  Vitals:    09/19/21 1710 09/19/21 2159   Weight: 95.3 kg (210 lb) 106.6 kg (235 lb 1.6 oz)     Vital Signs with Ranges  Temp:  [98  F (36.7  C)-98.8  F (37.1  C)] 98.3  F (36.8  C)  Pulse:  [54-83] 69  Resp:  [11-26] 18  BP: (105-148)/(62-81) 114/65  SpO2:  [90 %-99 %] 97 %  I/O last 3 completed shifts:  In: 480 [P.O.:480]  Out: -     GEN:  Alert, oriented x 3, appears comfortable, NAD.  HEENT:  Normocephalic/atraumatic, no scleral icterus, no nasal discharge, mouth moist.  CV:  Regular rate and rhythm, no murmur or JVD.  S1 + S2 noted, no S3 or S4.  LUNGS:  Clear to auscultation bilaterally without rales/rhonchi/wheezing/retractions.  Symmetric chest rise on inhalation noted.  ABD:  Active bowel sounds, soft, non-tender/non-distended.  No rebound/guarding/rigidity.  EXT:  No edema or cyanosis.  Hands/feet warm to touch with good signs of peripheral perfusion.  No joint synovitis noted.  SKIN:  Dry to touch, no exanthems noted in the visualized areas.  NEURO:  Symmetric muscle strength, sensation to touch grossly intact.  No new focal deficits appreciated.    Medications     lactated ringers 1,000 mL (09/20/21 0826)       acetaminophen  975 mg Oral Q8H     gabapentin  300 mg Oral TID     lidocaine  1 patch Transdermal Q24H     lidocaine   Transdermal Q8H     losartan  25 mg Oral Daily     metoprolol tartrate  50 mg Oral BID       Data   Recent Labs   Lab 09/20/21  0537 09/19/21  1736 09/19/21  1735   WBC 9.0  --  9.8   HGB 12.2*  --  14.1   MCV 96  --  96     --  356   INR  --   --  0.92     --  141   POTASSIUM 4.0  --  3.9   CHLORIDE 106  --  105   CO2 27  --  27   BUN 14  --  24   CR 0.78 1.3 1.20   ANIONGAP 3  --  9   BROOK 8.3*  --  9.1   *  --  135*       Recent Results (from the past 24 hour(s))   POC US ABDOMEN LIMITED    Impression     FAST (Focused Assessment with Sonography for Trauma) Procedure  Note:    PROCEDURE: PERFORMED BY: Dr. Yazan Caputo MD  INDICATIONS/SYMPTOM:  Chest Wall Pain and Abdominal Pain  PROBE: Low frequency convex probe  BODY LOCATION: The ultrasound was performed in the abdominal, subxiphoid and chest areas.  FINDINGS: No evidence of free fluid in hepatorenal (Morison's pouch), perisplenic, or and pelvic areas. No evidence of pericardial effusion.   Extended FAST exam (eFAST):   Images of both lung hemithoracies taken in 2D in multiple rib spaces        Right side:  Lung sliding artifact  Present     Comet tail artifacts  Absent   Left side:  Lung sliding artifact  Present     Comet tail artifacts  Absent    INTERPRETATION: The FAST exam was normal. There was no free fluid present. There was no pericardial effusion.  Sliding lung signs bilaterally  IMAGE DOCUMENTATION: Images were archived to hard drive.     Head CT w/o contrast    Narrative    EXAM: CT HEAD W/O CONTRAST  LOCATION: [facility]  DATE/TIME: 9/19/2021 5:47 PM    INDICATION: Fall, head injury.  COMPARISON: Head/facial bone CT dated 10/18/2014. Brain MRI dated 7/18/2009.  TECHNIQUE: Routine CT Head without IV contrast. Multiplanar reformats. Dose reduction techniques were used.    FINDINGS:  INTRACRANIAL CONTENTS: No intracranial hemorrhage, extraaxial collection, or mass effect. No CT evidence of acute infarct. Mild presumed chronic small vessel ischemic changes. Mild generalized volume loss. No hydrocephalus. Mild scattered intracranial   atherosclerotic calcifications.    VISUALIZED ORBITS/SINUSES/MASTOIDS: Prior bilateral cataract surgery. Visualized portions of the orbits are otherwise unremarkable. No paranasal sinus mucosal disease. No middle ear or mastoid effusion.    BONES/SOFT TISSUES: No acute abnormality. Degenerative arthropathy at the median atlantoaxial joint. On the  images, findings of previous median sternotomy.      Impression    IMPRESSION:  1.  No CT evidence for acute  intracranial process.  2.  Brain atrophy and presumed chronic microvascular ischemic changes as above.   CT Chest/Abdomen/Pelvis w Contrast    Narrative    EXAM: CT CHEST/ABDOMEN/PELVIS W CONTRAST  LOCATION: [facility]  DATE/TIME: 9/19/2021 5:53 PM    INDICATION: Fall with injury. Right-sided chest and flank pain.  COMPARISON: 10/18/2014  TECHNIQUE: CT scan of the chest, abdomen, and pelvis was performed following injection of IV contrast. Multiplanar reformats were obtained. Dose reduction techniques were used.   CONTRAST: 100mL Isovue-370    FINDINGS:   LUNGS AND PLEURA: Trace volume pneumothorax posteriorly on the right with 2 small collections measuring only 1-2 mm in thickness. These tiny air collections may actually be subpleural in location given their small size and posterior position. No   nondependent air is seen. No pleural effusion. Minimal dependent atelectasis bilaterally. Calcified benign granuloma left apex.    MEDIASTINUM/AXILLAE: Previous CABG. No fluid collection or adenopathy.    CORONARY ARTERY CALCIFICATION: CABG    HEPATOBILIARY: Fatty infiltration.    PANCREAS: Normal.    SPLEEN: Normal.    ADRENAL GLANDS: Numerous calcified granulomata.    KIDNEYS/BLADDER: Normal.    BOWEL: Diverticulosis of the colon. No acute inflammatory change. No obstruction.     LYMPH NODES: Normal.    VASCULATURE: Modest age-related atherosclerotic change.    PELVIC ORGANS: Normal. There is no ascites.    MUSCULOSKELETAL: Previous low lumbar instrumentation. Sternal wires.  There are old healed right rib fractures. There are also new nondisplaced right lateral rib fractures involving ribs 5-8.      Impression    IMPRESSION:  1.  New nondisplaced rib fractures right lateral ribs 5-8.  2.  Trace volume presumed pneumothorax posteriorly on the right measuring only 1-2 mm in thickness. This is a fairly focal air collection and could actually represent a scant focus of subpleural air rather than  small site of presumed loculated   pneumothorax. No pleural effusion.  3.  No other traumatic abnormalities evident.   Cervical spine CT w/o contrast    Narrative    EXAM: CT CERVICAL SPINE W/O CONTRAST  LOCATION: [facility]  DATE/TIME: 9/19/2021 5:52 PM    INDICATION: Fall, pain.  COMPARISON: Limited axial images of prior cervical spine CT dated 10/18/2014.  TECHNIQUE: Routine CT Cervical Spine without IV contrast. Multiplanar reformats. Dose reduction techniques were used.    FINDINGS:  VERTEBRA: No acute fracture is identified. There is retrolisthesis of C2 on C3 measuring approximately 3 mm, anterolisthesis of C4 on C5 measuring approximately 2-3 mm, and anterolisthesis of C7 on T1 measuring approximately 2-3 mm. Mild levoconvex   curvature at the cervicothoracic junction.    CANAL/FORAMINA: Multilevel degenerative disc disease including moderate to severe disc height loss at C5-C6 and C6-C7 with marginal anterior and posterior endplate osteophytes. Multilevel uncovertebral osteophytes. Moderate to severe multilevel   degenerative facet arthropathy. Multilevel diffuse disc osteophyte complexes. This results in moderate to severe appearing spinal canal stenosis at C6-C7 and at least moderate spinal canal stenosis at C5-C6 with lesser degrees of spinal canal narrowing   elsewhere. There is flattening of the normal cord contour at the level of C5-C6 and C6-C7. Varying degrees of multilevel neural foraminal stenosis, including severe right neural foraminal stenosis at C6-C7 and relatively lesser degrees of neural   foraminal stenosis elsewhere. Degenerative arthropathy at the medial atlantoaxial joint which appears relatively advanced.    PARASPINAL: Right carotid bifurcation mild atherosclerotic calcifications. The visualized paraspinous soft tissues otherwise appear unremarkable. On the  images, partially visualized findings of previous median sternotomy.      Impression     IMPRESSION:  1.  No acute fracture identified.  2.  Mild retrolisthesis of C2 on C3, mild anterolisthesis of C4 on C5, and mild anterolisthesis of C7 on T1.  3.  Multilevel degenerative changes, as described.  4.  Moderate/moderate to severe spinal canal stenosis at C5-C6 and C6-C7. Varying degrees of multilevel neural foraminal stenosis, greatest on the right at C6-C7 where it appears severe.   XR Shoulder Right G/E 3 Views    Narrative    EXAM: XR SHOULDER RIGHT G/E 3 VIEWS  LOCATION: [facility]  DATE/TIME: 9/19/2021 6:03 PM    INDICATION: Fall. Shoulder pain.  COMPARISON: None.      Impression    IMPRESSION:   1.  Mild osteoarthrosis of the AC joint.  2.  Mild osteoarthrosis of the glenohumeral joint.  3.  The humeral head is high-riding and comes in close proximity to the acromial process and there is also a subacromial enthesophyte; these findings would be consistent with chronic impingement and full-thickness rotator cuff tear.   4.  There are multiple right rib fractures, age indeterminate.   XR Chest 2 Views    Narrative    EXAM: XR CHEST 2 VW  LOCATION: [facility]  DATE/TIME: 9/20/2021 6:12 AM    INDICATION: follow up pneumothorax, right  COMPARISON: 10/20/2014      Impression    IMPRESSION: No pneumothorax or pleural effusion. No focal consolidation. Multiple median sternotomy wires. CABG related surgical clip. Cardiomediastinal silhouette within normal limits.

## 2021-09-20 NOTE — ED NOTES
[facility]  ED Nurse Handoff Report    Lokesh Shipley Jr. is a 71 year old male   ED Chief complaint: Fall  . ED Diagnosis:   Final diagnoses:   Fall, initial encounter   Closed fracture of multiple ribs of right side, initial encounter   Traumatic pneumothorax, initial encounter   Acute pain of right shoulder   Adrenal abnormality (H)     Allergies:   Allergies   Allergen Reactions     Pcn [Penicillins] Shortness Of Breath     Sulfa Drugs Shortness Of Breath       Code Status: Full Code  Activity level - Baseline/Home:  Independent. Activity Level - Current:   Assist X 2. Lift room needed: No. Bariatric: No   Needed: No   Isolation: No. Infection: Not Applicable.     Vital Signs:   Vitals:    09/19/21 1930 09/19/21 1940 09/19/21 1945 09/19/21 2000   BP: 124/76  122/67 129/73   Pulse: 71 77 79 80   Resp: 12 11 19 21   Temp:       SpO2: 94% 95% 95% 92%   Weight:       Height:           Cardiac Rhythm:  ,      Pain level:    Patient confused: No. Patient Falls Risk: No.   Elimination Status: Has voided   Patient Report - Initial Complaint: Lokesh Shipley Jr. is a 71 year old male with a history of  who presents for evaluation of right flank and shoulder pain after a 7-foot mechanical fall from a ladder about 1 hour prior to arrival. The patient states that he was trimming a tree when a falling branch took the ladder out from underneath him. He states that he hit his right side on a tree limb on the way down. He complains primarily of right flank, lateral chest wall pain, and shortness of breath. He also reports right shoulder pain. He denies hitting his head, loss of consciousness, neck pain, midline spinal tenderness, or other injury to his extremities. He is not anticoagulated.. Focused Assessment: borderline hypoxic - on 2L NC, pain right right rib cage and right shoulder   Tests Performed:   Labs Ordered and Resulted from Time of ED Arrival Up to the Time of Departure from the ED    BASIC METABOLIC PANEL - Abnormal; Notable for the following components:       Result Value    Glucose 135 (*)     All other components within normal limits   CBC WITH PLATELETS AND DIFFERENTIAL - Abnormal; Notable for the following components:    Absolute Immature Granulocytes 0.1 (*)     All other components within normal limits   ISTAT CREATININE POCT - Abnormal; Notable for the following components:    GFR, ESTIMATED POCT 56 (*)     All other components within normal limits   INR - Normal   EXTRA RED TOP TUBE   EXTRA BLOOD BANK PURPLE TOP TUBE   EXTRA BLOOD BANK PURPLE TOP TUBE   COVID-19 VIRUS (CORONAVIRUS) BY PCR   CBC WITH PLATELETS & DIFFERENTIAL    Narrative:     The following orders were created for panel order CBC with platelets differential.  Procedure                               Abnormality         Status                     ---------                               -----------         ------                     CBC with platelets and d...[[id number]]  Abnormal            Final result                 Please view results for these tests on the individual orders.   EXTRA TUBE    Narrative:     The following orders were created for panel order [place] Draw.  Procedure                               Abnormality         Status                     ---------                               -----------         ------                     Extra Red Top Tube[[id number]]                               Final result               Extra Blood Bank Purple ...[[id number]]                      Final result               Extra Blood Bank Purple ...[[id number]]                      Final result                 Please view results for these tests on the individual orders.     . Abnormal Results:   XR Shoulder Right G/E 3 Views   Final Result   IMPRESSION:    1.  Mild osteoarthrosis of the AC joint.   2.  Mild osteoarthrosis of the glenohumeral joint.   3.  The humeral head is high-riding and comes in close proximity to the  acromial process and there is also a subacromial enthesophyte; these findings would be consistent with chronic impingement and full-thickness rotator cuff tear.    4.  There are multiple right rib fractures, age indeterminate.      Cervical spine CT w/o contrast   Preliminary Result   IMPRESSION:   1.  No acute fracture identified.   2.  Mild retrolisthesis of C2 on C3, mild anterolisthesis of C4 on C5, and mild anterolisthesis of C7 on T1.   3.  Multilevel degenerative changes, as described.   4.  Moderate/moderate to severe spinal canal stenosis at C5-C6 and C6-C7. Varying degrees of multilevel neural foraminal stenosis, greatest on the right at C6-C7 where it appears severe.      CT Chest/Abdomen/Pelvis w Contrast   Final Result   IMPRESSION:   1.  New nondisplaced rib fractures right lateral ribs 5-8.   2.  Trace volume presumed pneumothorax posteriorly on the right measuring only 1-2 mm in thickness. This is a fairly focal air collection and could actually represent a scant focus of subpleural air rather than small site of presumed loculated    pneumothorax. No pleural effusion.   3.  No other traumatic abnormalities evident.      Head CT w/o contrast   Preliminary Result   IMPRESSION:   1.  No CT evidence for acute intracranial process.   2.  Brain atrophy and presumed chronic microvascular ischemic changes as above.      POC US ABDOMEN LIMITED   Final Result    FAST (Focused Assessment with Sonography for Trauma) Procedure Note:      PROCEDURE: PERFORMED BY: Dr. Yazan Caputo MD   INDICATIONS/SYMPTOM:  Chest Wall Pain and Abdominal Pain   PROBE: Low frequency convex probe   BODY LOCATION: The ultrasound was performed in the abdominal, subxiphoid and chest areas.   FINDINGS: No evidence of free fluid in hepatorenal (Morison's pouch), perisplenic, or and pelvic areas. No evidence of pericardial effusion.    Extended FAST exam (eFAST):    Images of both lung hemithoracies taken in 2D in multiple rib spaces           Right side:  Lung sliding artifact  Present      Comet tail artifacts  Absent    Left side:  Lung sliding artifact  Present      Comet tail artifacts  Absent      INTERPRETATION: The FAST exam was normal. There was no free fluid present. There was no pericardial effusion.  Sliding lung signs bilaterally   IMAGE DOCUMENTATION: Images were archived to hard drive.           .   Treatments provided: morphine, dilaudid, diagnostics  Family Comments: Wife and daughter at bedside with leave with admission transfer  OBS brochure/video discussed/provided to patient:  Yes  ED Medications:   Medications   morphine (PF) injection 4 mg (4 mg Intravenous Given 9/19/21 1739)   iopamidol (ISOVUE-370) solution 500 mL (100 mLs Intravenous Given 9/19/21 1755)   for CT scan flush use (100 mLs Intravenous Given 9/19/21 1755)   morphine (PF) injection 2 mg (2 mg Intravenous Given 9/19/21 1818)   HYDROmorphone (PF) (DILAUDID) injection 0.5 mg (0.5 mg Intravenous Given 9/19/21 1857)     Drips infusing:  No  For the majority of the shift, the patient's behavior Green. Interventions performed were n/a.    Sepsis treatment initiated: No     Patient tested for COVID 19 prior to admission: YES - pending    ED Nurse Name/Phone Number: Suzan Naylor RN,   8:48 PM    RECEIVING UNIT ED HANDOFF REVIEW    Above ED Nurse Handoff Report was reviewed: Yes  Reviewed by: Primitivo Lucia on September 19, 2021 at 9:26 PM

## 2021-09-20 NOTE — CONSULTS
[facility]   Trauma Surgical H&P         Assessment and Plan:   Assessment:   71 year old male who presents after a fall from a 7 foot ladder.  Acute traumatic injuries by imaging: right non-displaced rib fractures 5, 6,7 and 8 with tiny 1-2 mm pneumothorax.    This case was discussed with ED physician, Dr. Yazan Caputo.      Plan:   Admit to trauma service, observation status.  Pain control.  C-spine cleared by CT  Activity:  As tolerated  Diet:  regular  Consults: Hospitalist, anesthesia, PT.          Chief Complaint:   Was trimming tree from a 7 foot ladder when the branch took the ladder down, with him on it..  Right chest pain and SOB.          History of Present Illness:   This patient is a 71 year old  male who presented to the ED after fall from a 7 foot ladder   No LOC.  Landed on his right side.  Has right chest pain and lateral torso discomfort  Was initially very short of breath, that has improved.  No abdominal pain, head pain,n steve or back pain.     Negative loss of consciousnes.  Anticoagulation: No      History of syncope:  No  History of falls:  same accident, fall from ladder while trimming tree years ago.  At baseline ambulates independently.    Complains of chest  pain.    Denies  abdominal pain, nausea, emesis, dizziness, visual changes, headache, neck pain, back pain, extremity pain.      History is obtained from the patient.           Review of Systems:   Respiratory: Shortness of breath- right chest pain  Cardiovascular: negative  Gastrointestinal: as above  Genitourinary: negative  All remaining review of systems negative except as stated in HPI.         Past Medical History:    has a past medical history of Coronary artery disease, Heart attack (H) (2000), Hypertension, Other chronic pain, and Stented coronary artery (2000).          Past Surgical History:     Past Surgical History:   Procedure Laterality Date     angiogram with stent placement       BACK SURGERY       low back discectomy     CARDIAC SURGERY  2009    CABG x 3     OPTICAL TRACKING SYSTEM FUSION SPINE POSTERIOR LUMBAR TWO LEVELS N/A 3/12/2020    Procedure: L3-4 and L4-5 decompressive laminectomies with medial facetectomies with decompression and exposure of the L3, L4 and L5 roots bilaterally  Placement of Nuvasive Reline traction pedicle screws bilaterally from L3-5  L3-5 posterior lateral fusion with placement of locally harvested autologous bone;  Surgeon: Jose Lundberg MD;  Location:  OR             Social History:     Social History     Tobacco Use     Smoking status: Never Smoker     Smokeless tobacco: Never Used   Substance Use Topics     Alcohol use: Yes     Comment: 0-4 weekly             Family History:     Reviewed and no relevant FH.         Allergies:   This patient is allergic to is allergic to pcn [penicillins] and sulfa drugs.          Medications:   No current facility-administered medications on file prior to encounter.  acetaminophen (TYLENOL) 500 MG tablet, Take 500-1,000 mg by mouth every 6 hours as needed for mild pain  methocarbamol (ROBAXIN) 500 MG tablet, Take 1 tablet (500 mg) by mouth every 8 hours as needed for muscle spasms  metoprolol (LOPRESSOR) 50 MG tablet, Take 1 tablet (50 mg) by mouth 2 times daily  multivitamin w/minerals (MULTI-VITAMIN) tablet, Take 1 tablet by mouth daily  nitroGLYcerin (NITROSTAT) 0.4 MG sublingual tablet, Place 0.4 mg under the tongue every 5 minutes as needed for chest pain For chest pain place 1 tablet under the tongue every 5 minutes for 3 doses. If symptoms persist 5 minutes after 1st dose call 911.  olmesartan (BENICAR) 40 MG tablet, Take 20 mg by mouth daily   tadalafil (CIALIS) 10 MG tablet, 1 tab po prn as directed  traMADol (ULTRAM) 50 MG tablet, Take 1 tablet (50 mg) by mouth every 6 hours as needed for moderate to severe pain               Physical Exam:   /72   Pulse 79   Temp 98.6  F (37  C)   Resp 13   Ht 1.778 m (5'  "10\")   Wt 95.3 kg (210 lb)   SpO2 94%   BMI 30.13 kg/m    General appearance: well-nourished, mild distress with breathing  HEENT: Pupils are equal and round.  Head is normocephalic and atraumatic.  Neck is without masses, swelling, ecchymosis.  TTP none.  Chest:  Clear to auscultation bilaterally.  Heart with regular rate and rhythm, no murmurs.  No palpable swelling, ecchymosis, no crepitus, no visible deformity.  TTP right lateral chest wall..  Abdomen:  Nondistended, soft, nontender to palpation.  No masses.  Back: no palpable masses or visible deformity.  TTP none.  No CVA tenderness.  Extremities: Warm without edema.  No deformity.  Pulses intact.  Bilateral shoulder soreness without acute pain with ROM.  Neurologic: Alert and oriented times three.  No focal deficits.  Cranial nerves II through XII intact grossly.  Moves all extremities with good strength.  Follows commands.  Speech clear.  Sensation grossly intact.    Psychiatric: mood and affect are appropriate.  Skin: without jaundice, lesions, rashes.           Data:   Labs Reviewed:  Lab Results   Component Value Date    WBC 9.8 09/19/2021    WBC 7.4 10/21/2014     Lab Results   Component Value Date    HGB 14.1 09/19/2021    HGB 12.0 03/14/2020     Lab Results   Component Value Date     09/19/2021     10/18/2014       Last Basic Metabolic Panel:  Lab Results   Component Value Date     09/19/2021     03/13/2020      Lab Results   Component Value Date    POTASSIUM 3.9 09/19/2021    POTASSIUM 4.4 03/13/2020     Lab Results   Component Value Date    CHLORIDE 105 09/19/2021    CHLORIDE 106 03/13/2020     Lab Results   Component Value Date    BROOK 9.1 09/19/2021    BROOK 8.5 03/13/2020     Lab Results   Component Value Date    CO2 27 09/19/2021    CO2 29 03/13/2020     Lab Results   Component Value Date    BUN 24 09/19/2021    BUN 15 03/13/2020     Lab Results   Component Value Date    CR 1.3 09/19/2021    CR 1.20 09/19/2021    CR 0.72 " 03/13/2020     Lab Results   Component Value Date     09/19/2021     03/13/2020       Recent Labs   Lab 09/19/21  1736 09/19/21  1735   NA  --  141   POTASSIUM  --  3.9   CHLORIDE  --  105   CO2  --  27   ANIONGAP  --  9   GLC  --  135*   BUN  --  24   CR 1.3 1.20   GFRESTIMATED 56* 61   BROOK  --  9.1     Recent Labs   Lab 09/19/21  1735   WBC 9.8   HGB 14.1   HCT 42.4   MCV 96          Imaging:  Results for orders placed or performed during the hospital encounter of 09/19/21   Head CT w/o contrast    Narrative    EXAM: CT HEAD W/O CONTRAST  LOCATION: [facility]  DATE/TIME: 9/19/2021 5:47 PM    INDICATION: Fall, head injury.  COMPARISON: Head/facial bone CT dated 10/18/2014. Brain MRI dated 7/18/2009.  TECHNIQUE: Routine CT Head without IV contrast. Multiplanar reformats. Dose reduction techniques were used.    FINDINGS:  INTRACRANIAL CONTENTS: No intracranial hemorrhage, extraaxial collection, or mass effect. No CT evidence of acute infarct. Mild presumed chronic small vessel ischemic changes. Mild generalized volume loss. No hydrocephalus. Mild scattered intracranial   atherosclerotic calcifications.    VISUALIZED ORBITS/SINUSES/MASTOIDS: Prior bilateral cataract surgery. Visualized portions of the orbits are otherwise unremarkable. No paranasal sinus mucosal disease. No middle ear or mastoid effusion.    BONES/SOFT TISSUES: No acute abnormality. Degenerative arthropathy at the median atlantoaxial joint. On the  images, findings of previous median sternotomy.      Impression    IMPRESSION:  1.  No CT evidence for acute intracranial process.  2.  Brain atrophy and presumed chronic microvascular ischemic changes as above.   Cervical spine CT w/o contrast    Narrative    EXAM: CT CERVICAL SPINE W/O CONTRAST  LOCATION: [facility]  DATE/TIME: 9/19/2021 5:52 PM    INDICATION: Fall, pain.  COMPARISON: Limited axial images of prior cervical spine CT  dated 10/18/2014.  TECHNIQUE: Routine CT Cervical Spine without IV contrast. Multiplanar reformats. Dose reduction techniques were used.    FINDINGS:  VERTEBRA: No acute fracture is identified. There is retrolisthesis of C2 on C3 measuring approximately 3 mm, anterolisthesis of C4 on C5 measuring approximately 2-3 mm, and anterolisthesis of C7 on T1 measuring approximately 2-3 mm. Mild levoconvex   curvature at the cervicothoracic junction.    CANAL/FORAMINA: Multilevel degenerative disc disease including moderate to severe disc height loss at C5-C6 and C6-C7 with marginal anterior and posterior endplate osteophytes. Multilevel uncovertebral osteophytes. Moderate to severe multilevel   degenerative facet arthropathy. Multilevel diffuse disc osteophyte complexes. This results in moderate to severe appearing spinal canal stenosis at C6-C7 and at least moderate spinal canal stenosis at C5-C6 with lesser degrees of spinal canal narrowing   elsewhere. There is flattening of the normal cord contour at the level of C5-C6 and C6-C7. Varying degrees of multilevel neural foraminal stenosis, including severe right neural foraminal stenosis at C6-C7 and relatively lesser degrees of neural   foraminal stenosis elsewhere. Degenerative arthropathy at the medial atlantoaxial joint which appears relatively advanced.    PARASPINAL: Right carotid bifurcation mild atherosclerotic calcifications. The visualized paraspinous soft tissues otherwise appear unremarkable. On the  images, partially visualized findings of previous median sternotomy.      Impression    IMPRESSION:  1.  No acute fracture identified.  2.  Mild retrolisthesis of C2 on C3, mild anterolisthesis of C4 on C5, and mild anterolisthesis of C7 on T1.  3.  Multilevel degenerative changes, as described.  4.  Moderate/moderate to severe spinal canal stenosis at C5-C6 and C6-C7. Varying degrees of multilevel neural foraminal stenosis, greatest on the right at C6-C7 where  it appears severe.   CT Chest/Abdomen/Pelvis w Contrast    Narrative    EXAM: CT CHEST/ABDOMEN/PELVIS W CONTRAST  LOCATION: [facility]  DATE/TIME: 9/19/2021 5:53 PM    INDICATION: Fall with injury. Right-sided chest and flank pain.  COMPARISON: 10/18/2014  TECHNIQUE: CT scan of the chest, abdomen, and pelvis was performed following injection of IV contrast. Multiplanar reformats were obtained. Dose reduction techniques were used.   CONTRAST: 100mL Isovue-370    FINDINGS:   LUNGS AND PLEURA: Trace volume pneumothorax posteriorly on the right with 2 small collections measuring only 1-2 mm in thickness. These tiny air collections may actually be subpleural in location given their small size and posterior position. No   nondependent air is seen. No pleural effusion. Minimal dependent atelectasis bilaterally. Calcified benign granuloma left apex.    MEDIASTINUM/AXILLAE: Previous CABG. No fluid collection or adenopathy.    CORONARY ARTERY CALCIFICATION: CABG    HEPATOBILIARY: Fatty infiltration.    PANCREAS: Normal.    SPLEEN: Normal.    ADRENAL GLANDS: Numerous calcified granulomata.    KIDNEYS/BLADDER: Normal.    BOWEL: Diverticulosis of the colon. No acute inflammatory change. No obstruction.     LYMPH NODES: Normal.    VASCULATURE: Modest age-related atherosclerotic change.    PELVIC ORGANS: Normal. There is no ascites.    MUSCULOSKELETAL: Previous low lumbar instrumentation. Sternal wires.  There are old healed right rib fractures. There are also new nondisplaced right lateral rib fractures involving ribs 5-8.      Impression    IMPRESSION:  1.  New nondisplaced rib fractures right lateral ribs 5-8.  2.  Trace volume presumed pneumothorax posteriorly on the right measuring only 1-2 mm in thickness. This is a fairly focal air collection and could actually represent a scant focus of subpleural air rather than small site of presumed loculated   pneumothorax. No pleural effusion.  3.  No other  traumatic abnormalities evident.   XR Shoulder Right G/E 3 Views    Narrative    EXAM: XR SHOULDER RIGHT G/E 3 VIEWS  LOCATION: [facility]  DATE/TIME: 9/19/2021 6:03 PM    INDICATION: Fall. Shoulder pain.  COMPARISON: None.      Impression    IMPRESSION:   1.  Mild osteoarthrosis of the AC joint.  2.  Mild osteoarthrosis of the glenohumeral joint.  3.  The humeral head is high-riding and comes in close proximity to the acromial process and there is also a subacromial enthesophyte; these findings would be consistent with chronic impingement and full-thickness rotator cuff tear.   4.  There are multiple right rib fractures, age indeterminate.   POC US ABDOMEN LIMITED    Impression     FAST (Focused Assessment with Sonography for Trauma) Procedure Note:    PROCEDURE: PERFORMED BY: Dr. Yazan Caputo MD  INDICATIONS/SYMPTOM:  Chest Wall Pain and Abdominal Pain  PROBE: Low frequency convex probe  BODY LOCATION: The ultrasound was performed in the abdominal, subxiphoid and chest areas.  FINDINGS: No evidence of free fluid in hepatorenal (Morison's pouch), perisplenic, or and pelvic areas. No evidence of pericardial effusion.   Extended FAST exam (eFAST):   Images of both lung hemithoracies taken in 2D in multiple rib spaces        Right side:  Lung sliding artifact  Present     Comet tail artifacts  Absent   Left side:  Lung sliding artifact  Present     Comet tail artifacts  Absent    INTERPRETATION: The FAST exam was normal. There was no free fluid present. There was no pericardial effusion.  Sliding lung signs bilaterally  IMAGE DOCUMENTATION: Images were archived to hard drive.           Tracey Farmer MD    Time spent with the patient, reviewing the EMR, reviewing laboratory and imaging studies, more than 50% of which was counseling and coordinating care:  60 minutes.

## 2021-09-20 NOTE — PROGRESS NOTES
09/20/21 1003   Quick Adds   Type of Visit Initial PT Evaluation   Living Environment   People in home spouse   Current Living Arrangements house   Home Accessibility stairs to enter home;stairs within home   Number of Stairs, Main Entrance 2   Stair Railings, Main Entrance none   Number of Stairs, Within Home, Primary 7  (split level)   Stair Railings, Within Home, Primary railing on left side (ascending)   Transportation Anticipated family or friend will provide   Living Environment Comments Lives in split level home with spouse. Walk in shower, no grab bars.   Self-Care   Usual Activity Tolerance good   Current Activity Tolerance moderate   Regular Exercise Other (see comments)  (in winter yes)   Equipment Currently Used at Home none   Activity/Exercise/Self-Care Comment Pt does lawn care for work, IND in all ADLs, IADLs. No AD at baseline.   Disability/Function   Number of times patient has fallen within last six months 1  (fall from ladder)   General Information   Onset of Illness/Injury or Date of Surgery 09/19/21   Referring Physician Tracey Farmer MD   Patient/Family Therapy Goals Statement (PT) return home   Pertinent History of Current Problem (include personal factors and/or comorbidities that impact the POC) Per chart: Lokesh Shipley Jr. is a 71 year old male with PMHx CAD with prior MI, HTN, chronic pain, and lumbar fusion who was admitted on 9/19/2021 following a fall from a 7ft ladder 1 hour prior to arrival. Laboratory workup without significant abn findings. Imaging: Right shoulder XR: multiple rib fractures, multiple chronic findings; CT C/A/P: Nondisplaced rib fractures right lateral ribs 5-8, No pneumothorax or pleural effusion. CT cervical spine no acute findings. CT head: No acute findings.    Existing Precautions/Restrictions no known precautions/restrictions   General Observations Rib fractures acutely painful with transfers and bed mobility   Cognition   Orientation Status  (Cognition) oriented x 4   Affect/Mental Status (Cognition) WNL   Follows Commands (Cognition) WNL   Pain Assessment   Patient Currently in Pain Yes, see Vital Sign flowsheet   Posture    Posture Comments Slightly flexed with ambulation   Range of Motion (ROM)   ROM Quick Adds ROM WFL   Strength   Manual Muscle Testing Quick Adds Strength WFL   Bed Mobility   Comment (Bed Mobility) Pt completes sit>supine SBA, slowly and with increased rib pain, flat bed and no use of bed rails.    Transfers   Transfer Safety Comments Completes STS to no AD   Gait/Stairs (Locomotion)   Comment (Gait/Stairs) Pt initially ambulates with UE support from IV pole, able to ambulate with no AD and no LOB. Exhibits decreased gait speed, good step through pattern.   Balance   Balance Comments Good static sitting and standing balance. Dynamic standing not formally assessed, however pt reports feeling slightly unsteady as compared to baseline.    Clinical Impression   Criteria for Skilled Therapeutic Intervention yes, treatment indicated   PT Diagnosis (PT) impaired functional mobility   Influenced by the following impairments rib fracture pain   Functional limitations due to impairments bed mobility, transfers, decreased activity tolerance   Clinical Presentation Stable/Uncomplicated   Clinical Presentation Rationale PMH, clinician impression   Clinical Decision Making (Complexity) low complexity   Therapy Frequency (PT) One time eval and treatment only   Predicted Duration of Therapy Intervention (days/wks) 1x   Planned Therapy Interventions (PT) bed mobility training;gait training;patient/family education;transfer training   Risk & Benefits of therapy have been explained evaluation/treatment results reviewed;care plan/treatment goals reviewed;risks/benefits reviewed;current/potential barriers reviewed;participants voiced agreement with care plan;participants included;patient   PT Discharge Planning    PT Discharge Recommendation (DC Rec)  home with assist   PT Rationale for DC Rec Pt mobilizing below baseline, but primary limitor is pain. Able to ambulate safely with no AD, however pt reports has walker and cane at home if needed. Pt spouse will be available for any assist needed for higher level activities. Does not need physical assist for mobility.

## 2021-09-20 NOTE — PROVIDER NOTIFICATION
Pt triggered rib fracture BPA alert due to pain of 8/10. Tachypnea with RR 24 after up to restroom but vitally stable on RA. Awaiting med rec and verification to give pain meds. Pt also wanting to eat and requesting for regular diet. Dr. Ana M barbosa.

## 2021-09-20 NOTE — PROGRESS NOTES
[facility]    General Surgery Progress Note          Assessment and Plan:   Assessment:    Lokesh Shipley Jr. is a 71 year old male admitted for traumatic fractures of right rib 5, 6, 7 and 8, small pneumothorax seen.  - CXR today shows - no pneumothorax or pleural effusion        Plan:   Pain mgmt: only using Tylenol and Gabapentin  Diet: on clears, will advance to regular  IVFs: LR @ 100 mL/hr - may saline lock when po intake well tolerated  Activity: encourage ambulation, use of IS every year  Dispo: would like to observe overnight - monitor O2 sats and pain control           Interval History:   Pt sitting up in chair.  Reports pain is manageable, worse when getting up from bed. Using Tylenol and Gabapentin, doesn't want to use Oxycodone.  States he is normally a shallow breather.  O2 monitor was going off multiple times during night.  Hungry, asking if he can eat.  Wants to go home later today if possible.          Physical Exam:   Temp: 98  F (36.7  C) Temp src: Oral BP: 105/66 Pulse: 54   Resp: 18   SpO2: 95 % O2 Device: None (Room air) Oxygen Delivery: 2 LPM      I/O last 3 completed shifts:  In: 480 [P.O.:480]  Out: -     Constitutional: alert and no distress   Chest: tender to palpation right lateral side  Respiratory: non labored breathing  Abdomen: soft, non-tender.            Data:   Data   Recent Labs   Lab 09/20/21  0537 09/19/21  1736 09/19/21  1735   WBC 9.0  --  9.8   HGB 12.2*  --  14.1   MCV 96  --  96     --  356   INR  --   --  0.92     --  141   POTASSIUM 4.0  --  3.9   CHLORIDE 106  --  105   CO2 27  --  27   BUN 14  --  24   CR 0.78 1.3 1.20   ANIONGAP 3  --  9   BROOK 8.3*  --  9.1   *  --  135*        Ester Araujo PA-C     Seen and agree.  He is saturating well on room air and comfortable with his respiratory toilet.  No Pneumothorax on CXR.    Okay to discharge.  Tracey Farmer MD

## 2021-09-20 NOTE — PROGRESS NOTES
Occupational Therapy: Orders received. Chart reviewed and discussed with care team.? Occupational Therapy not indicated due to pt has assist for ADL at home if needed, safe with mobility per PT, and safe to discharge per care team.? Defer discharge recommendations to PT and care team.? Will complete orders.

## 2021-09-20 NOTE — CONSULTS
[facility]  Hospitalist Consultation    Date of Admission:  9/19/2021    Assessment & Plan   Lokesh Shipley Jr. is a 71 year old male with PMHx CAD with prior MI, HTN, chronic pain, and lumbar fusion who was admitted on 9/19/2021 following a fall from a 7ft ladder 1 hour prior to arrival.     Vitals in ED: Afebrile, HR 67, /71. RR 20, Pulse ox 93% on 2 L. Laboratory workup without significant abn findings. Imaging: Right shoulder XR: multiple rib fractures, multiple chronic findings; CT C/A/P: Nondisplaced rib fractures right lateral ribs 5-8, Presume pneumothorax posteriorly measure 1-2 mm. CT cervical spine no acute findings. CT head: No acute findings.     Trauma surgery paged for hospital admission with medicine consult for medical management assistance.     Traumatic fall with nondisplaced right lateral rib fractures 5-8; Traumatic pneumothorax   -General surgery primary service - defer management - monitor vitals/oxygen, incentive spirometer, pain control overnight, repeat imaging in AM; anesthesia consult for possible nerve block    Hypertension: PTA Losartan (substitued for olmesartan); metoprolol     CAD s/p PCI and CABG: No home medications. No cardiac chest pain on admission. Complicates cares.     Obesity BMI 33: Complicates cares    DVT Prophylaxis: Defer to primary service  Code Status: Full Code    Disposition: Admitted for observation. Anticipate discharge in AM if pain controlled and no change in pneumothorax. Defer to surgery.     Cathryn Viramontes,     Reason for Consult   Reason for consult: I was asked by Dr. Tracey Farmer to evaluate this patient for medical management    Primary Care Physician   Zahira Morales    Chief Complaint   Traumatic mechanical fall  History is obtained from the patient and electronic health record     History of Present Illness   Lokesh Shipley Jr. is a 71 year old male with PMHx CAD with prior MI, HTN, chronic pain, and lumbar  fusion who was admitted on 9/19/2021 following a fall from a 7ft ladder 1 hour prior to arrival.     Patient was cutting down tree limbs and a limb hit the ladder and he fell off the ladder hitting a branch falling roughly 7 ft. Patient fell onto his right-side resulting in acute chest wall pain. SOB due to pain. No cough or sputum production. Chest wall pain without cardiac pain. Right shoulder pain. Denies head trauma or LOC. No syncope. Currently in pain but on room air. Able to communicate with ease. Limited mobility given pain. Otherwise had been feeling well.     Past Medical History    I have reviewed this patient's medical history and updated it with pertinent information if needed.   Past Medical History:   Diagnosis Date     Coronary artery disease      Heart attack (H) 2000     Hypertension      Other chronic pain     low back and radiates down both legs.  Also, numbness & tingling down both legs.     Stented coronary artery 2000    x1     Past Surgical History   Past surgical history reviewed with no previous surgeries identified.    Prior to Admission Medications   Prior to Admission Medications   Prescriptions Last Dose Informant Patient Reported? Taking?   acetaminophen (TYLENOL) 500 MG tablet Unknown at Unknown time  Yes Yes   Sig: Take 500-1,000 mg by mouth every 6 hours as needed for mild pain   methocarbamol (ROBAXIN) 500 MG tablet Unknown at Unknown time  No Yes   Sig: Take 1 tablet (500 mg) by mouth every 8 hours as needed for muscle spasms   metoprolol (LOPRESSOR) 50 MG tablet 9/19/2021 at 0800  No Yes   Sig: Take 1 tablet (50 mg) by mouth 2 times daily   multivitamin w/minerals (MULTI-VITAMIN) tablet 9/19/2021 at 0800  Yes Yes   Sig: Take 1 tablet by mouth daily   nitroGLYcerin (NITROSTAT) 0.4 MG sublingual tablet Unknown at Unknown time  Yes Yes   Sig: Place 0.4 mg under the tongue every 5 minutes as needed for chest pain For chest pain place 1 tablet under the tongue every 5 minutes for 3  doses. If symptoms persist 5 minutes after 1st dose call 911.   olmesartan (BENICAR) 40 MG tablet 2021 at PM  Yes Yes   Sig: Take 20 mg by mouth daily    tadalafil (CIALIS) 10 MG tablet Past Month  No Yes   Si tab po prn as directed      Facility-Administered Medications: None     Allergies   Allergies   Allergen Reactions     Pcn [Penicillins] Shortness Of Breath     Sulfa Drugs Shortness Of Breath     Social History   I have reviewed this patient's social history and updated it with pertinent information if needed. Lokesh Patten Quinten Osei.  reports that he has never smoked. He has never used smokeless tobacco. He reports current alcohol use. He reports that he does not use drugs.    Family History   Family history reviewed with patient and is noncontributory.    Review of Systems   The 10 point Review of Systems is negative other than noted in the HPI    Physical Exam   Temp: 98.6  F (37  C)   BP: 124/70 Pulse: 83   Resp: 25 SpO2: 93 % O2 Device: Nasal cannula Oxygen Delivery: 2 LPM  Vital Signs with Ranges  Temp:  [98  F (36.7  C)-98.6  F (37  C)] 98  F (36.7  C)  Pulse:  [67-83] 82  Resp:  [11-26] 24  BP: (119-148)/(67-81) 137/70  SpO2:  [90 %-99 %] 97 %  210 lbs 0 oz    Constitutional: Awake, alert, cooperative, no apparent distress but uncomfortable with any form of movement. Non-toxic. Appears stated age.   HEENT: Atraumatic. Normocephalic. Conjunctiva non-injected. Sclera anicteric. MMM.   Respiratory: Moves air bilaterally. Slightly diminished on right. Crackles left middle lobe. No wheezing or rales. No use of accessory respiratory muscles. No paradoxical breathing.   Cardiovascular: Regular rate and rhythm, normal S1 and S2, and no murmur noted  GI: Normal bowel sounds, soft, non-distended, non-tender  Skin/Integumen: No rashes, no cyanosis, no edema. No bruising.     Data   -Data reviewed today: All pertinent laboratory and imaging results from this encounter were reviewed. I personally  reviewed    Recent Labs   Lab 09/19/21  1736 09/19/21  1735   WBC  --  9.8   HGB  --  14.1   MCV  --  96   PLT  --  356   INR  --  0.92   NA  --  141   POTASSIUM  --  3.9   CHLORIDE  --  105   CO2  --  27   BUN  --  24   CR 1.3 1.20   ANIONGAP  --  9   BROOK  --  9.1   GLC  --  135*     Recent Results (from the past 24 hour(s))   POC US ABDOMEN LIMITED    Impression     FAST (Focused Assessment with Sonography for Trauma) Procedure Note:    PROCEDURE: PERFORMED BY: Dr. Yazan Caputo MD  INDICATIONS/SYMPTOM:  Chest Wall Pain and Abdominal Pain  PROBE: Low frequency convex probe  BODY LOCATION: The ultrasound was performed in the abdominal, subxiphoid and chest areas.  FINDINGS: No evidence of free fluid in hepatorenal (Morison's pouch), perisplenic, or and pelvic areas. No evidence of pericardial effusion.   Extended FAST exam (eFAST):   Images of both lung hemithoracies taken in 2D in multiple rib spaces        Right side:  Lung sliding artifact  Present     Comet tail artifacts  Absent   Left side:  Lung sliding artifact  Present     Comet tail artifacts  Absent    INTERPRETATION: The FAST exam was normal. There was no free fluid present. There was no pericardial effusion.  Sliding lung signs bilaterally  IMAGE DOCUMENTATION: Images were archived to hard drive.     Head CT w/o contrast    Narrative    EXAM: CT HEAD W/O CONTRAST  LOCATION: [facility]  DATE/TIME: 9/19/2021 5:47 PM    INDICATION: Fall, head injury.  COMPARISON: Head/facial bone CT dated 10/18/2014. Brain MRI dated 7/18/2009.  TECHNIQUE: Routine CT Head without IV contrast. Multiplanar reformats. Dose reduction techniques were used.    FINDINGS:  INTRACRANIAL CONTENTS: No intracranial hemorrhage, extraaxial collection, or mass effect. No CT evidence of acute infarct. Mild presumed chronic small vessel ischemic changes. Mild generalized volume loss. No hydrocephalus. Mild scattered intracranial   atherosclerotic  calcifications.    VISUALIZED ORBITS/SINUSES/MASTOIDS: Prior bilateral cataract surgery. Visualized portions of the orbits are otherwise unremarkable. No paranasal sinus mucosal disease. No middle ear or mastoid effusion.    BONES/SOFT TISSUES: No acute abnormality. Degenerative arthropathy at the median atlantoaxial joint. On the  images, findings of previous median sternotomy.      Impression    IMPRESSION:  1.  No CT evidence for acute intracranial process.  2.  Brain atrophy and presumed chronic microvascular ischemic changes as above.   CT Chest/Abdomen/Pelvis w Contrast    Narrative    EXAM: CT CHEST/ABDOMEN/PELVIS W CONTRAST  LOCATION: [facility]  DATE/TIME: 9/19/2021 5:53 PM    INDICATION: Fall with injury. Right-sided chest and flank pain.  COMPARISON: 10/18/2014  TECHNIQUE: CT scan of the chest, abdomen, and pelvis was performed following injection of IV contrast. Multiplanar reformats were obtained. Dose reduction techniques were used.   CONTRAST: 100mL Isovue-370    FINDINGS:   LUNGS AND PLEURA: Trace volume pneumothorax posteriorly on the right with 2 small collections measuring only 1-2 mm in thickness. These tiny air collections may actually be subpleural in location given their small size and posterior position. No   nondependent air is seen. No pleural effusion. Minimal dependent atelectasis bilaterally. Calcified benign granuloma left apex.    MEDIASTINUM/AXILLAE: Previous CABG. No fluid collection or adenopathy.    CORONARY ARTERY CALCIFICATION: CABG    HEPATOBILIARY: Fatty infiltration.    PANCREAS: Normal.    SPLEEN: Normal.    ADRENAL GLANDS: Numerous calcified granulomata.    KIDNEYS/BLADDER: Normal.    BOWEL: Diverticulosis of the colon. No acute inflammatory change. No obstruction.     LYMPH NODES: Normal.    VASCULATURE: Modest age-related atherosclerotic change.    PELVIC ORGANS: Normal. There is no ascites.    MUSCULOSKELETAL: Previous low lumbar  instrumentation. Sternal wires.  There are old healed right rib fractures. There are also new nondisplaced right lateral rib fractures involving ribs 5-8.      Impression    IMPRESSION:  1.  New nondisplaced rib fractures right lateral ribs 5-8.  2.  Trace volume presumed pneumothorax posteriorly on the right measuring only 1-2 mm in thickness. This is a fairly focal air collection and could actually represent a scant focus of subpleural air rather than small site of presumed loculated   pneumothorax. No pleural effusion.  3.  No other traumatic abnormalities evident.   Cervical spine CT w/o contrast    Narrative    EXAM: CT CERVICAL SPINE W/O CONTRAST  LOCATION: [facility]  DATE/TIME: 9/19/2021 5:52 PM    INDICATION: Fall, pain.  COMPARISON: Limited axial images of prior cervical spine CT dated 10/18/2014.  TECHNIQUE: Routine CT Cervical Spine without IV contrast. Multiplanar reformats. Dose reduction techniques were used.    FINDINGS:  VERTEBRA: No acute fracture is identified. There is retrolisthesis of C2 on C3 measuring approximately 3 mm, anterolisthesis of C4 on C5 measuring approximately 2-3 mm, and anterolisthesis of C7 on T1 measuring approximately 2-3 mm. Mild levoconvex   curvature at the cervicothoracic junction.    CANAL/FORAMINA: Multilevel degenerative disc disease including moderate to severe disc height loss at C5-C6 and C6-C7 with marginal anterior and posterior endplate osteophytes. Multilevel uncovertebral osteophytes. Moderate to severe multilevel   degenerative facet arthropathy. Multilevel diffuse disc osteophyte complexes. This results in moderate to severe appearing spinal canal stenosis at C6-C7 and at least moderate spinal canal stenosis at C5-C6 with lesser degrees of spinal canal narrowing   elsewhere. There is flattening of the normal cord contour at the level of C5-C6 and C6-C7. Varying degrees of multilevel neural foraminal stenosis, including severe right  neural foraminal stenosis at C6-C7 and relatively lesser degrees of neural   foraminal stenosis elsewhere. Degenerative arthropathy at the medial atlantoaxial joint which appears relatively advanced.    PARASPINAL: Right carotid bifurcation mild atherosclerotic calcifications. The visualized paraspinous soft tissues otherwise appear unremarkable. On the  images, partially visualized findings of previous median sternotomy.      Impression    IMPRESSION:  1.  No acute fracture identified.  2.  Mild retrolisthesis of C2 on C3, mild anterolisthesis of C4 on C5, and mild anterolisthesis of C7 on T1.  3.  Multilevel degenerative changes, as described.  4.  Moderate/moderate to severe spinal canal stenosis at C5-C6 and C6-C7. Varying degrees of multilevel neural foraminal stenosis, greatest on the right at C6-C7 where it appears severe.   XR Shoulder Right G/E 3 Views    Narrative    EXAM: XR SHOULDER RIGHT G/E 3 VIEWS  LOCATION: [facility]  DATE/TIME: 9/19/2021 6:03 PM    INDICATION: Fall. Shoulder pain.  COMPARISON: None.      Impression    IMPRESSION:   1.  Mild osteoarthrosis of the AC joint.  2.  Mild osteoarthrosis of the glenohumeral joint.  3.  The humeral head is high-riding and comes in close proximity to the acromial process and there is also a subacromial enthesophyte; these findings would be consistent with chronic impingement and full-thickness rotator cuff tear.   4.  There are multiple right rib fractures, age indeterminate.

## 2021-09-20 NOTE — DISCHARGE INSTRUCTIONS
HOME CARE FOLLOWING TRAUMA ADMISSION - NONSURGICAL FRACTURES/ABRASIONS  JEREMIE Hightower, HERON Arizmendi, CAREY Quigley    NEXT APPOINTMENT:  Follow-up with your primary care provider in 2-3 days for recheck of your injuries and overall medical status.  At that time you may address ongoing care recommendations and activity restrictions.    WOUND CARE:  Apply bacitracin to abrasions twice a day and cover sites with non-stick dressings afterwards.      ACTIVITY:  Light Activity -- you may immediately be up and about as tolerated.  Driving -- you may drive when comfortable and off narcotic pain medications.  Light Work -- resume when comfortable off pain medications.  (If you can drive, you probably can work.)  Strenuous Work/Activity -- limit lifting to 10 pounds for 2 weeks.  Restrictions after this time should be recommended by your primary care provider.    DISCOMFORT:  Use pain medications as prescribed by your surgeon.  Take the pain medication with some food, when possible, to minimize side effects.  Expect gradual improvement.    DIET:  Return to diet you were on before surgery.  Drink plenty of fluids.  While taking pain medications, increase dietary fiber or add a fiber supplementation like Metamucil or Citrucel to help prevent constipation - a possible side effect of pain medications.    Surgeon office contact number:  Office Phone:  [phone]      FREQUENTLY ASKED QUESTIONS:    Q:  What can I do to minimize constipation (very hard stools, or lack of stools)?  A:  Stay well hydrated.  Increase your dietary fiber intake or take a fiber supplement -with plenty of water.  Walk around frequently.  You may consider an over-the-counter stool-softener.  Your Pharmacist can assist you with choosing one that is stocked at your pharmacy.  Constipation is also one of the most common side effects of pain medication.  If you are using pain medication, be pro-active and try to PREVENT  problems with constipation by taking the steps above BEFORE constipation becomes a problem.    Q:  Why am I having a hard time sleeping now that I am at home?  A:  Many medications you receive while you are in the hospital can impact your sleep for a number of days after your surgery/hospitalization.  Decreased level of activity and naps during the day may also make sleeping at night difficult.  Try to minimize day-time naps, and get up frequently during the day to walk around your home during your recovery time.  Sleep aides may be of some help, but are not recommended for long-term use.            If you have other questions, please call the office Monday thru Friday between 8am and 5pm to discuss with the nurse or physician assistant.  #[phone]    There is a surgeon ON CALL on weekday evenings and over the weekend in case of urgent need only, and may be contacted at the same number.    If you are having an emergency, call 911 or proceed to your nearest emergency department.

## 2021-09-20 NOTE — CONSULTS
"CLINICAL NUTRITION SERVICES  -  ASSESSMENT NOTE      Malnutrition Diagnosis: Unable to determine due to lack of nutrition history and NFPE d/t pt availability with other staff members        REASON FOR ASSESSMENT  Lokesh Shipley Jr. is a 71 year old male seen by Registered Dietitian for Provider Order - \"trauma patient with multiple rib fractures\".     PMH of: No nutritionally pertinent.    Admit 2/2: Fall from ladder resulting in rib fractures and pneumothorax.      NUTRITION HISTORY  - Information obtained from chart.  Multiple attempts to meet with patient in room though with other staff members and now has discharge orders.  - Allergies: NKFA.      CURRENT NUTRITION ORDERS  Diet Order:     Regular    Current Intake/Tolerance:  No flowsheet recordings, limited timeframe since admission.      NUTRITION FOCUSED PHYSICAL ASSESSMENT FOR DIAGNOSING MALNUTRITION)  No, not available    Obtained from Chart/Interdisciplinary Team:  - No documentation of PI  - Stooling patterns reviewed    ANTHROPOMETRICS  Height: 5' 10\"  Weight: 235 lbs 1.6 oz  Body mass index is 33.73 kg/m .  Weight Status:  Obesity Grade I BMI 30-34.9  Weight History:  Wt Readings from Last 10 Encounters:   09/19/21 106.6 kg (235 lb 1.6 oz)   03/12/20 92.5 kg (204 lb)   10/18/14 93 kg (205 lb)   10/18/14 93 kg (205 lb)     - Weight of 216# from 3/18/2021.  Suspect admit of wt of 210# may be more accurate?    LABS  Labs reviewed:  Electrolytes  Potassium (mmol/L)   Date Value   09/20/2021 4.0   09/19/2021 3.9   03/13/2020 4.4   10/18/2014 3.9   10/18/2014 3.8     Phosphorus (mg/dL)   Date Value   09/20/2021 2.7    Blood Glucose  Glucose (mg/dL)   Date Value   09/20/2021 117 (H)   09/19/2021 135 (H)   03/13/2020 124 (H)   10/18/2014 132 (H)   10/18/2014 137 (H)   10/18/2014 132 (H)   10/15/2013 103 (H)     Hemoglobin A1C (%)   Date Value   02/18/2009 5.7    Inflammatory Markers  WBC (10e9/L)   Date Value   10/21/2014 7.4   10/18/2014 17.5 (H) "   10/18/2014 9.7     WBC Count (10e3/uL)   Date Value   09/20/2021 9.0   09/19/2021 9.8     Albumin (g/dL)   Date Value   10/18/2014 3.9   07/18/2009 4.4      Magnesium (mg/dL)   Date Value   09/20/2021 2.3   09/20/2021 2.2   02/21/2009 1.8     Sodium (mmol/L)   Date Value   09/20/2021 136   09/19/2021 141   03/13/2020 140   10/18/2014 136   10/18/2014 138    Renal  Urea Nitrogen (mg/dL)   Date Value   09/20/2021 14   09/19/2021 24   03/13/2020 15   10/18/2014 11   10/18/2014 11     Creatinine (mg/dL)   Date Value   09/20/2021 0.78   09/19/2021 1.20   03/13/2020 0.72   10/18/2014 0.83   10/18/2014 0.85     Creatinine POCT (mg/dL)   Date Value   09/19/2021 1.3     Additional  Triglycerides (mg/dL)   Date Value   12/17/2013 95   10/15/2013 126   05/02/2012 111     Ketones Urine (mg/dL)   Date Value   10/20/2014 Negative        B/P: 114/65, T: 98.3, P: 69, R: 18      MEDICATIONS  Medications reviewed:    acetaminophen  975 mg Oral Q8H     gabapentin  300 mg Oral TID     lidocaine  1 patch Transdermal Q24H     lidocaine   Transdermal Q8H     losartan  25 mg Oral Daily     metoprolol tartrate  50 mg Oral BID        lactated ringers 1,000 mL (09/20/21 0826)          ASSESSED NUTRITION NEEDS PER APPROVED PRACTICE GUIDELINES:    Dosing Weight 95 kg - accurate weight?  Estimated Energy Needs: 20-25 Kcal/Kg  Justification: maintenance  Estimated Protein Needs: 1-1.2 g pro/Kg  Justification: preservation of lean body mass  Estimated Fluid Needs: per MD      NUTRITION DIAGNOSIS:  Predicted inadequate nutrient intake (energy/protein) related to potential for decline in PO intakes during admit with rib fractures and pain.    NUTRITION INTERVENTIONS  Recommendations / Nutrition Prescription  Diet per MD.      Implementation  Nutrition education: Not appropriate at this time due to patient condition - not available.    Nutrition Goals  Patient to consume at least 75% of meals BID-TID while admitted.      MONITORING AND  EVALUATION:  Progress towards goals will be monitored and evaluated per protocol and Practice Guidelines          Brittney Corey RDN, LD  Clinical Dietitian  3rd floor/ICU: [phone]  All other floors: [phone]  Weekend/holiday: [phone]

## 2021-09-21 ENCOUNTER — PATIENT OUTREACH (OUTPATIENT)
Dept: CARE COORDINATION | Facility: CLINIC | Age: 71
End: 2021-09-21

## 2021-09-21 DIAGNOSIS — Z71.89 OTHER SPECIFIED COUNSELING: ICD-10-CM

## 2021-09-21 NOTE — PROGRESS NOTES
"Clinic Care Coordination Contact  [facility]: Post-Discharge Note  SITUATION                                                      Admission:    Admission Date: 09/19/21   Reason for Admission: fall from 7 ft ladder  Discharge:   Discharge Date: 09/20/21  Discharge Diagnosis: fractured ribs    BACKGROUND                                                      Lokesh Shipley Jr. is a 71 year old male with PMHx CAD with prior MI, HTN, chronic pain, and lumbar fusion who was admitted on 9/19/2021 following a fall from a 7ft ladder 1 hour prior to arrival.      Patient was cutting down tree limbs and a limb hit the ladder and he fell off the ladder hitting a branch falling roughly 7 ft. Patient fell onto his right-side resulting in acute chest wall pain. SOB due to pain. No cough or sputum production. Chest wall pain without cardiac pain. Right shoulder pain. Denies head trauma or LOC. No syncope. Currently in pain but on room air. Able to communicate with ease. Limited mobility given pain. Otherwise had been feeling well.     ASSESSMENT           Discharge Assessment  How are you doing now that you are home?: \"I'm doing ok but I can't really sneeze and I need some more of the muscle relaxers\"  How are your symptoms? (Red Flag symptoms escalate to triage hotline per guidelines): Improved  Do you feel your condition is stable enough to be safe at home until your provider visit?: Yes  Does the patient have their discharge instructions? : Yes  Does the patient have questions regarding their discharge instructions? : No  Were you started on any new medications or were there changes to any of your previous medications? : Yes  Does the patient have all of their medications?: Yes  Do you have questions regarding any of your medications? : No  Do you have all of your needed medical supplies or equipment (DME)?  (i.e. oxygen tank, CPAP, cane, etc.): Yes  Discharge follow-up appointment scheduled within 14 calendar days? : " Yes  Discharge Follow Up Appointment Scheduled with?: Primary Care Provider    Post-op ([facility] Only)  If the patient had a surgery or procedure, do they have any questions for a nurse?: No    Post-op (Clinicians Only)  Did the patient have surgery or a procedure: No  Fever: No  Chills: No  Eating & Drinking: eating and drinking without complaints/concerns  PO Intake: regular diet  Bowel Function: normal  Urinary Status: voiding without complaint/concerns        PLAN                                                      Outpatient Plan:  Follow up with primary care provider, Zahira Morales, within 7 days    No future appointments.      For any urgent concerns, please contact our 24 hour nurse triage line: [phone] ([phone])         Carol Gallegos  Community Health Worker  Connected Care Resource [facility]  [phone]

## 2021-09-22 NOTE — DISCHARGE SUMMARY
[facility]    Discharge Summary  Surgery    Date of Admission:  9/19/2021  Date of Discharge:  9/20/2021  4:19 PM  Discharging Provider: Tracey Farmer MD  Discharge Summary Note completed by: Jeanie Newman PA-C on 9/22/2021  Date of Service: The patient was personally seen by Discharging Providers on the day of discharge.    Discharge Diagnoses   Active Problems:    Adrenal abnormality (H)    Traumatic pneumothorax, initial encounter    Fall, initial encounter    Acute pain of right shoulder    Closed fracture of multiple ribs of right side, initial encounter    History of Present Illness   Lokesh Shipley Jr. is a 71 year old male who presented to the ED after fall from a 7 foot ladder; when the branch took the ladder down, he went down with it.  He landed on his right side and when seen in the ED, he had right chest pain and lateral torso discomfort.  He was initially very short of breath, which improved by time of admission.  No abdominal pain, head pain, neck or back pain.  Negative loss of consciousness throughout incident.  ED eval revealed right non-displaced rib fractures 5, 6, 7 and 8, with tiny 1-2 mm pneumothorax.  Patient admitted to trauma service for pain management, anesthesia consult due to level of pain (possible thoracic block), pulmonary toilet, oxygen supplementation as needed, PT.    Hospital Course   Lokesh Shipley Jr. was admitted on 9/19/2021.  The following problems were addressed during his hospitalization:  Patient Active Problem List   Diagnosis     Fall (on)(from) incline, initial encounter     S/P lumbar fusion     Adrenal abnormality (H)     Traumatic pneumothorax, initial encounter     Fall, initial encounter     Acute pain of right shoulder     Closed fracture of multiple ribs of right side, initial encounter     Pain control: was via IV until able to tolerate PO intake and transitioned to PO pain meds.  Anesthesia consult cancelled on 9/20  as pain improved and consideration of thoracic block no longer needed.  Remarkable hospital course events: Pulmonary toilet and supplemental oxygen needed transiently.  Significant pain with breathing initially, which vastly improved allowing cancellation of Anesthesia consultation (for possible thoracic block).  He was able to transition to oral and topical treatments.  Chest films on 9/20 was negative for pneumothorax or pleural effusion.  Hospitalist and PT evals done.  Home meds restarted as indicated.  No remarkable events.  Please see Hospitalist notes for further details if needed.  Discharge to home with assist.  Lokesh met all criteria for release on 9/20/2021  4:19 PM.  He was afebrile, tolerating diet, pain controlled on PO meds, ambulating well, and had return of bowel function.    Medications discontinued or adjusted during this hospitalization: see discharge med list below.    Antibiotics prescribed at discharge: None prescribed     Imaging study follow up needs:   -per PCP at f/u visit    Discharge Instructions and Follow-Up:  Discharge diet: Regular   Discharge activity: Activity as tolerated  No driving or operating machinery while on narcotic analgesics   Discharge follow-up: Follow up with primary care provider within 7 days     Jeanie Newman PA-C      Discharge Disposition   Discharged to home   Condition at discharge: Stable    Pending Results   Unresulted Labs Ordered in the Past 30 Days of this Admission     No orders found from 8/20/2021 to 9/20/2021.          Primary Care Physician   Zahira Morales    Consultations This Hospital Stay   ANESTHESIOLOGY IP CONSULT  OCCUPATIONAL THERAPY ADULT IP CONSULT  PHYSICAL THERAPY ADULT IP CONSULT  ANESTHESIOLOGY IP CONSULT  OCCUPATIONAL THERAPY ADULT IP CONSULT  PHYSICAL THERAPY ADULT IP CONSULT  NUTRITION SERVICES ADULT IP CONSULT  HOSPITALIST IP CONSULT  HOSPITALIST IP CONSULT    Discharge Orders      Reason for your hospital stay    Fall      Follow-up and recommended labs and tests     Follow up with primary care provider, Zahira Morales, within 7 days     Activity    Your activity upon discharge: activity as tolerated     Diet    Follow this diet upon discharge: Orders Placed This Encounter      Regular Diet Adult     Discharge Medications   Discharge Medication List as of 9/20/2021  3:18 PM      START taking these medications    Details   oxyCODONE (ROXICODONE) 5 MG tablet Take 1-2 tablets (5-10 mg) by mouth every 3 hours as needed for moderate to severe pain, Disp-10 tablet, R-0, E-Prescribe         CONTINUE these medications which have NOT CHANGED    Details   acetaminophen (TYLENOL) 500 MG tablet Take 500-1,000 mg by mouth every 6 hours as needed for mild pain, Historical      methocarbamol (ROBAXIN) 500 MG tablet Take 1 tablet (500 mg) by mouth every 8 hours as needed for muscle spasms, Disp-40 tablet,R-0, Local Print      metoprolol (LOPRESSOR) 50 MG tablet Take 1 tablet (50 mg) by mouth 2 times daily, Disp-180 tablet, R-3, E-Prescribe      multivitamin w/minerals (MULTI-VITAMIN) tablet Take 1 tablet by mouth daily, Historical      nitroGLYcerin (NITROSTAT) 0.4 MG sublingual tablet Place 0.4 mg under the tongue every 5 minutes as needed for chest pain For chest pain place 1 tablet under the tongue every 5 minutes for 3 doses. If symptoms persist 5 minutes after 1st dose call 911., Historical      olmesartan (BENICAR) 40 MG tablet Take 20 mg by mouth daily , Historical      tadalafil (CIALIS) 10 MG tablet 1 tab po prn as directed, Disp-9 tablet, R-0, E-PrescribePatient is aware that he needs to make an appointment for an office visit  to obtain future refills.           Allergies   Allergies   Allergen Reactions     Pcn [Penicillins] Shortness Of Breath     Sulfa Drugs Shortness Of Breath     Data   Most Recent 3 CBC's:  Recent Labs   Lab Test 09/20/21  0537 09/19/21  1735 03/14/20  0613 03/13/20  0553 10/21/14  0826 10/18/14  2037  10/18/14  2037   WBC 9.0 9.8  --   --  7.4   < > 17.5*   HGB 12.2* 14.1 12.0*   < >  --   --  15.4   MCV 96 96  --   --   --   --  91    356  --   --   --   --  350    < > = values in this interval not displayed.      Most Recent 3 BMP's:  Recent Labs   Lab Test 09/20/21  0537 09/19/21  1736 09/19/21  1735 03/13/20  0553     --  141 140   POTASSIUM 4.0  --  3.9 4.4   CHLORIDE 106  --  105 106   CO2 27  --  27 29   BUN 14  --  24 15   CR 0.78 1.3 1.20 0.72   ANIONGAP 3  --  9 5   BROOK 8.3*  --  9.1 8.5   *  --  135* 124*       Results for orders placed or performed during the hospital encounter of 09/19/21   Head CT w/o contrast    Narrative    EXAM: CT HEAD W/O CONTRAST  LOCATION: [facility]  DATE/TIME: 9/19/2021 5:47 PM    INDICATION: Fall, head injury.  COMPARISON: Head/facial bone CT dated 10/18/2014. Brain MRI dated 7/18/2009.  TECHNIQUE: Routine CT Head without IV contrast. Multiplanar reformats. Dose reduction techniques were used.    FINDINGS:  INTRACRANIAL CONTENTS: No intracranial hemorrhage, extraaxial collection, or mass effect. No CT evidence of acute infarct. Mild presumed chronic small vessel ischemic changes. Mild generalized volume loss. No hydrocephalus. Mild scattered intracranial   atherosclerotic calcifications.    VISUALIZED ORBITS/SINUSES/MASTOIDS: Prior bilateral cataract surgery. Visualized portions of the orbits are otherwise unremarkable. No paranasal sinus mucosal disease. No middle ear or mastoid effusion.    BONES/SOFT TISSUES: No acute abnormality. Degenerative arthropathy at the median atlantoaxial joint. On the  images, findings of previous median sternotomy.      Impression    IMPRESSION:  1.  No CT evidence for acute intracranial process.  2.  Brain atrophy and presumed chronic microvascular ischemic changes as above.       Cervical spine CT w/o contrast    Narrative    EXAM: CT CERVICAL SPINE W/O CONTRAST  LOCATION: [facility]  [facility]  DATE/TIME: 9/19/2021 5:52 PM    INDICATION: Fall, pain.  COMPARISON: Limited axial images of prior cervical spine CT dated 10/18/2014.  TECHNIQUE: Routine CT Cervical Spine without IV contrast. Multiplanar reformats. Dose reduction techniques were used.    FINDINGS:  VERTEBRA: No acute fracture is identified. There is retrolisthesis of C2 on C3 measuring approximately 3 mm, anterolisthesis of C4 on C5 measuring approximately 2-3 mm, and anterolisthesis of C7 on T1 measuring approximately 2-3 mm. Mild levoconvex   curvature at the cervicothoracic junction.    CANAL/FORAMINA: Multilevel degenerative disc disease including moderate to severe disc height loss at C5-C6 and C6-C7 with marginal anterior and posterior endplate osteophytes. Multilevel uncovertebral osteophytes. Moderate to severe multilevel   degenerative facet arthropathy. Multilevel diffuse disc osteophyte complexes. This results in moderate to severe appearing spinal canal stenosis at C6-C7 and at least moderate spinal canal stenosis at C5-C6 with lesser degrees of spinal canal narrowing   elsewhere. There is flattening of the normal cord contour at the level of C5-C6 and C6-C7. Varying degrees of multilevel neural foraminal stenosis, including severe right neural foraminal stenosis at C6-C7 and relatively lesser degrees of neural   foraminal stenosis elsewhere. Degenerative arthropathy at the medial atlantoaxial joint which appears relatively advanced.    PARASPINAL: Right carotid bifurcation mild atherosclerotic calcifications. The visualized paraspinous soft tissues otherwise appear unremarkable. On the  images, partially visualized findings of previous median sternotomy.      Impression    IMPRESSION:  1.  No acute fracture identified.  2.  Mild retrolisthesis of C2 on C3, mild anterolisthesis of C4 on C5, and mild anterolisthesis of C7 on T1.  3.  Multilevel degenerative changes, as described.  4.  Moderate/moderate to severe  spinal canal stenosis at C5-C6 and C6-C7. Varying degrees of multilevel neural foraminal stenosis, greatest on the right at C6-C7 where it appears severe.       CT Chest/Abdomen/Pelvis w Contrast    Narrative    EXAM: CT CHEST/ABDOMEN/PELVIS W CONTRAST  LOCATION: [facility]  DATE/TIME: 9/19/2021 5:53 PM    INDICATION: Fall with injury. Right-sided chest and flank pain.  COMPARISON: 10/18/2014  TECHNIQUE: CT scan of the chest, abdomen, and pelvis was performed following injection of IV contrast. Multiplanar reformats were obtained. Dose reduction techniques were used.   CONTRAST: 100mL [id number]-[id number]    FINDINGS:   LUNGS AND PLEURA: Trace volume pneumothorax posteriorly on the right with 2 small collections measuring only 1-2 mm in thickness. These tiny air collections may actually be subpleural in location given their small size and posterior position. No   nondependent air is seen. No pleural effusion. Minimal dependent atelectasis bilaterally. Calcified benign granuloma left apex.    MEDIASTINUM/AXILLAE: Previous CABG. No fluid collection or adenopathy.    CORONARY ARTERY CALCIFICATION: CABG    HEPATOBILIARY: Fatty infiltration.    PANCREAS: Normal.    SPLEEN: Normal.    ADRENAL GLANDS: Numerous calcified granulomata.    KIDNEYS/BLADDER: Normal.    BOWEL: Diverticulosis of the colon. No acute inflammatory change. No obstruction.     LYMPH NODES: Normal.    VASCULATURE: Modest age-related atherosclerotic change.    PELVIC ORGANS: Normal. There is no ascites.    MUSCULOSKELETAL: Previous low lumbar instrumentation. Sternal wires.  There are old healed right rib fractures. There are also new nondisplaced right lateral rib fractures involving ribs 5-8.      Impression    IMPRESSION:  1.  New nondisplaced rib fractures right lateral ribs 5-8.  2.  Trace volume presumed pneumothorax posteriorly on the right measuring only 1-2 mm in thickness. This is a fairly focal air collection and could actually  represent a scant focus of subpleural air rather than small site of presumed loculated   pneumothorax. No pleural effusion.  3.  No other traumatic abnormalities evident.       XR Shoulder Right G/E 3 Views    Narrative    EXAM: XR SHOULDER RIGHT G/E 3 VIEWS  LOCATION: [facility]  DATE/TIME: 9/19/2021 6:03 PM    INDICATION: Fall. Shoulder pain.  COMPARISON: None.      Impression    IMPRESSION:   1.  Mild osteoarthrosis of the AC joint.  2.  Mild osteoarthrosis of the glenohumeral joint.  3.  The humeral head is high-riding and comes in close proximity to the acromial process and there is also a subacromial enthesophyte; these findings would be consistent with chronic impingement and full-thickness rotator cuff tear.   4.  There are multiple right rib fractures, age indeterminate.       POC US ABDOMEN LIMITED    Impression     FAST (Focused Assessment with Sonography for Trauma) Procedure Note:    PROCEDURE: PERFORMED BY: Dr. Yazan Caputo MD  INDICATIONS/SYMPTOM:  Chest Wall Pain and Abdominal Pain  PROBE: Low frequency convex probe  BODY LOCATION: The ultrasound was performed in the abdominal, subxiphoid and chest areas.  FINDINGS: No evidence of free fluid in hepatorenal (Morison's pouch), perisplenic, or and pelvic areas. No evidence of pericardial effusion.   Extended FAST exam (eFAST):   Images of both lung hemithoracies taken in 2D in multiple rib spaces        Right side:  Lung sliding artifact  Present     Comet tail artifacts  Absent   Left side:  Lung sliding artifact  Present     Comet tail artifacts  Absent    INTERPRETATION: The FAST exam was normal. There was no free fluid present. There was no pericardial effusion.  Sliding lung signs bilaterally  IMAGE DOCUMENTATION: Images were archived to hard drive.         XR Chest 2 Views    Narrative    EXAM: XR CHEST 2 VW  LOCATION: [facility]  DATE/TIME: 9/20/2021 6:12 AM    INDICATION: follow up  pneumothorax, right  COMPARISON: 10/20/2014      Impression    IMPRESSION: No pneumothorax or pleural effusion. No focal consolidation. Multiple median sternotomy wires. CABG related surgical clip. Cardiomediastinal silhouette within normal limits.

## 2021-10-23 ENCOUNTER — HEALTH MAINTENANCE LETTER (OUTPATIENT)
Age: 71
End: 2021-10-23

## 2022-10-10 ENCOUNTER — HEALTH MAINTENANCE LETTER (OUTPATIENT)
Age: 72
End: 2022-10-10

## 2022-11-27 ENCOUNTER — HEALTH MAINTENANCE LETTER (OUTPATIENT)
Age: 72
End: 2022-11-27

## 2024-01-07 ENCOUNTER — HEALTH MAINTENANCE LETTER (OUTPATIENT)
Age: 74
End: 2024-01-07

## 2024-01-22 ENCOUNTER — TELEPHONE (OUTPATIENT)
Dept: CARDIOLOGY | Facility: CLINIC | Age: 74
End: 2024-01-22
Payer: COMMERCIAL

## 2024-01-22 NOTE — TELEPHONE ENCOUNTER
Received call from Dental office requesting faxed recommendations re: whether pt needs premedication prior to dental work. Pt was last seen by Dr. Mireles November 2013. Advised the dental office that we will not make recommendations on a patient we have not seen in over 10 years. Pt will need to make appt to be seen in order for us to give recommendations. Jorge CARPENTER

## 2024-02-27 ENCOUNTER — HOSPITAL ENCOUNTER (INPATIENT)
Facility: CLINIC | Age: 74
LOS: 1 days | Discharge: ANOTHER HOSPITAL | DRG: 251 | End: 2024-02-27
Attending: STUDENT IN AN ORGANIZED HEALTH CARE EDUCATION/TRAINING PROGRAM | Admitting: INTERNAL MEDICINE
Payer: COMMERCIAL

## 2024-02-27 ENCOUNTER — HOSPITAL ENCOUNTER (INPATIENT)
Facility: CLINIC | Age: 74
LOS: 3 days | Discharge: HOME | DRG: 322 | End: 2024-03-02
Attending: STUDENT IN AN ORGANIZED HEALTH CARE EDUCATION/TRAINING PROGRAM | Admitting: INTERNAL MEDICINE
Payer: COMMERCIAL

## 2024-02-27 ENCOUNTER — APPOINTMENT (OUTPATIENT)
Dept: GENERAL RADIOLOGY | Facility: CLINIC | Age: 74
DRG: 251 | End: 2024-02-27
Attending: STUDENT IN AN ORGANIZED HEALTH CARE EDUCATION/TRAINING PROGRAM
Payer: COMMERCIAL

## 2024-02-27 ENCOUNTER — APPOINTMENT (OUTPATIENT)
Dept: CARDIOLOGY | Facility: CLINIC | Age: 74
DRG: 251 | End: 2024-02-27
Attending: INTERNAL MEDICINE
Payer: COMMERCIAL

## 2024-02-27 VITALS
DIASTOLIC BLOOD PRESSURE: 69 MMHG | BODY MASS INDEX: 30.5 KG/M2 | RESPIRATION RATE: 15 BRPM | SYSTOLIC BLOOD PRESSURE: 112 MMHG | TEMPERATURE: 97.2 F | WEIGHT: 205.91 LBS | OXYGEN SATURATION: 97 % | HEART RATE: 66 BPM | HEIGHT: 69 IN

## 2024-02-27 DIAGNOSIS — I21.3 STEMI (ST ELEVATION MYOCARDIAL INFARCTION) (H): ICD-10-CM

## 2024-02-27 DIAGNOSIS — I21.01 ST ELEVATION MYOCARDIAL INFARCTION INVOLVING LEFT MAIN CORONARY ARTERY (H): Primary | ICD-10-CM

## 2024-02-27 DIAGNOSIS — Z95.5 S/P DRUG ELUTING CORONARY STENT PLACEMENT: ICD-10-CM

## 2024-02-27 DIAGNOSIS — I21.29 ST ELEVATION MYOCARDIAL INFARCTION (STEMI) INVOLVING OTHER CORONARY ARTERY (H): Primary | ICD-10-CM

## 2024-02-27 DIAGNOSIS — I21.3 ST ELEVATION MYOCARDIAL INFARCTION (STEMI), UNSPECIFIED ARTERY (H): ICD-10-CM

## 2024-02-27 PROBLEM — Z98.61 PERCUTANEOUS TRANSLUMINAL CORONARY ANGIOPLASTY STATUS: Status: ACTIVE | Noted: 2024-02-27

## 2024-02-27 PROBLEM — I21.4 NSTEMI (NON-ST ELEVATED MYOCARDIAL INFARCTION) (H): Status: ACTIVE | Noted: 2024-02-27

## 2024-02-27 PROBLEM — I21.4 NSTEMI (NON-ST ELEVATED MYOCARDIAL INFARCTION) (H): Chronic | Status: ACTIVE | Noted: 2024-02-27

## 2024-02-27 PROBLEM — I21.4 NSTEMI (NON-ST ELEVATED MYOCARDIAL INFARCTION) (H): Status: RESOLVED | Noted: 2024-02-27 | Resolved: 2024-02-27

## 2024-02-27 LAB
ACT BLD: 205 SECONDS (ref 74–150)
ACT BLD: 282 SECONDS (ref 74–150)
ACT BLD: 302 SECONDS (ref 74–150)
ANION GAP BLD CALCULATED.3IONS-SCNC: 15 MMOL/L (ref 3–14)
ANION GAP SERPL CALCULATED.3IONS-SCNC: 10 MMOL/L (ref 7–15)
ATRIAL RATE - MUSE: 74 BPM
ATRIAL RATE - MUSE: 77 BPM
BASOPHILS # BLD AUTO: 0 10E3/UL (ref 0–0.2)
BASOPHILS NFR BLD AUTO: 1 %
BUN SERPL-MCNC: 15 MG/DL (ref 8–23)
BUN SERPL-MCNC: 17 MG/DL (ref 7–30)
CA-I BLD-MCNC: 4.7 MG/DL (ref 4.4–5.2)
CALCIUM SERPL-MCNC: 9.5 MG/DL (ref 8.8–10.2)
CHLORIDE BLD-SCNC: 103 MMOL/L (ref 94–109)
CHLORIDE SERPL-SCNC: 103 MMOL/L (ref 98–107)
CHOLEST SERPL-MCNC: 204 MG/DL
CO2 BLD-SCNC: 27 MMOL/L (ref 20–32)
CREAT BLD-MCNC: 0.8 MG/DL (ref 0.7–1.3)
CREAT SERPL-MCNC: 0.84 MG/DL (ref 0.67–1.17)
DEPRECATED HCO3 PLAS-SCNC: 26 MMOL/L (ref 22–29)
DIASTOLIC BLOOD PRESSURE - MUSE: NORMAL MMHG
DIASTOLIC BLOOD PRESSURE - MUSE: NORMAL MMHG
EGFRCR SERPLBLD CKD-EPI 2021: >90 ML/MIN/1.73M2
EOSINOPHIL # BLD AUTO: 0.2 10E3/UL (ref 0–0.7)
EOSINOPHIL NFR BLD AUTO: 3 %
ERYTHROCYTE [DISTWIDTH] IN BLOOD BY AUTOMATED COUNT: 11.9 % (ref 10–15)
GLUCOSE BLD-MCNC: 115 MG/DL (ref 70–99)
GLUCOSE BLDC GLUCOMTR-MCNC: 114 MG/DL (ref 70–99)
GLUCOSE BLDC GLUCOMTR-MCNC: 122 MG/DL (ref 70–99)
GLUCOSE BLDC GLUCOMTR-MCNC: 98 MG/DL (ref 70–99)
GLUCOSE SERPL-MCNC: 122 MG/DL (ref 70–99)
HCT VFR BLD AUTO: 42.1 % (ref 40–53)
HCT VFR BLD CALC: 41 % (ref 40–53)
HDLC SERPL-MCNC: 48 MG/DL
HGB BLD-MCNC: 13.9 G/DL (ref 13.3–17.7)
HGB BLD-MCNC: 14 G/DL (ref 13.3–17.7)
HOLD SPECIMEN: NORMAL
IMM GRANULOCYTES # BLD: 0 10E3/UL
IMM GRANULOCYTES NFR BLD: 1 %
INTERPRETATION ECG - MUSE: NORMAL
INTERPRETATION ECG - MUSE: NORMAL
LDLC SERPL CALC-MCNC: 132 MG/DL
LYMPHOCYTES # BLD AUTO: 1.6 10E3/UL (ref 0.8–5.3)
LYMPHOCYTES NFR BLD AUTO: 28 %
MCH RBC QN AUTO: 29.7 PG (ref 26.5–33)
MCHC RBC AUTO-ENTMCNC: 33.3 G/DL (ref 31.5–36.5)
MCV RBC AUTO: 89 FL (ref 78–100)
MONOCYTES # BLD AUTO: 0.7 10E3/UL (ref 0–1.3)
MONOCYTES NFR BLD AUTO: 13 %
NEUTROPHILS # BLD AUTO: 3.1 10E3/UL (ref 1.6–8.3)
NEUTROPHILS NFR BLD AUTO: 54 %
NONHDLC SERPL-MCNC: 156 MG/DL
NRBC # BLD AUTO: 0 10E3/UL
NRBC BLD AUTO-RTO: 0 /100
P AXIS - MUSE: 60 DEGREES
P AXIS - MUSE: 69 DEGREES
PLATELET # BLD AUTO: 326 10E3/UL (ref 150–450)
POTASSIUM BLD-SCNC: 4.3 MMOL/L (ref 3.4–5.3)
POTASSIUM SERPL-SCNC: 4 MMOL/L (ref 3.4–5.3)
PR INTERVAL - MUSE: 212 MS
PR INTERVAL - MUSE: 226 MS
QRS DURATION - MUSE: 110 MS
QRS DURATION - MUSE: 112 MS
QT - MUSE: 396 MS
QT - MUSE: 398 MS
QTC - MUSE: 441 MS
QTC - MUSE: 448 MS
R AXIS - MUSE: 2 DEGREES
R AXIS - MUSE: 7 DEGREES
RBC # BLD AUTO: 4.71 10E6/UL (ref 4.4–5.9)
SODIUM BLD-SCNC: 140 MMOL/L (ref 135–145)
SODIUM SERPL-SCNC: 139 MMOL/L (ref 135–145)
SYSTOLIC BLOOD PRESSURE - MUSE: NORMAL MMHG
SYSTOLIC BLOOD PRESSURE - MUSE: NORMAL MMHG
T AXIS - MUSE: 94 DEGREES
T AXIS - MUSE: 98 DEGREES
TRIGL SERPL-MCNC: 121 MG/DL
TROPONIN T SERPL HS-MCNC: 31 NG/L
TROPONIN T SERPL HS-MCNC: 452 NG/L
TROPONIN T SERPL HS-MCNC: 527 NG/L
VENTRICULAR RATE- MUSE: 74 BPM
VENTRICULAR RATE- MUSE: 77 BPM
WBC # BLD AUTO: 5.6 10E3/UL (ref 4–11)

## 2024-02-27 PROCEDURE — 93308 TTE F-UP OR LMTD: CPT

## 2024-02-27 PROCEDURE — 258N000003 HC RX IP 258 OP 636: Performed by: INTERNAL MEDICINE

## 2024-02-27 PROCEDURE — 93325 DOPPLER ECHO COLOR FLOW MAPG: CPT | Mod: 26 | Performed by: INTERNAL MEDICINE

## 2024-02-27 PROCEDURE — 92924 PRQ TRLUML C ATHRC 1 ART&/BR: CPT | Mod: RA | Performed by: INTERNAL MEDICINE

## 2024-02-27 PROCEDURE — 99291 CRITICAL CARE FIRST HOUR: CPT | Mod: 25 | Performed by: INTERNAL MEDICINE

## 2024-02-27 PROCEDURE — 92978 ENDOLUMINL IVUS OCT C 1ST: CPT | Mod: RA | Performed by: INTERNAL MEDICINE

## 2024-02-27 PROCEDURE — 92979 ENDOLUMINL IVUS OCT C EA: CPT | Performed by: INTERNAL MEDICINE

## 2024-02-27 PROCEDURE — 93308 TTE F-UP OR LMTD: CPT | Mod: 26 | Performed by: INTERNAL MEDICINE

## 2024-02-27 PROCEDURE — 71045 X-RAY EXAM CHEST 1 VIEW: CPT

## 2024-02-27 PROCEDURE — 99223 1ST HOSP IP/OBS HIGH 75: CPT | Performed by: INTERNAL MEDICINE

## 2024-02-27 PROCEDURE — 99285 EMERGENCY DEPT VISIT HI MDM: CPT | Mod: 25

## 2024-02-27 PROCEDURE — 96375 TX/PRO/DX INJ NEW DRUG ADDON: CPT

## 2024-02-27 PROCEDURE — B2131ZZ FLUOROSCOPY OF MULTIPLE CORONARY ARTERY BYPASS GRAFTS USING LOW OSMOLAR CONTRAST: ICD-10-PCS | Performed by: INTERNAL MEDICINE

## 2024-02-27 PROCEDURE — 250N000011 HC RX IP 250 OP 636: Performed by: INTERNAL MEDICINE

## 2024-02-27 PROCEDURE — C1725 CATH, TRANSLUMIN NON-LASER: HCPCS | Performed by: INTERNAL MEDICINE

## 2024-02-27 PROCEDURE — 80047 BASIC METABLC PNL IONIZED CA: CPT

## 2024-02-27 PROCEDURE — 36415 COLL VENOUS BLD VENIPUNCTURE: CPT | Performed by: STUDENT IN AN ORGANIZED HEALTH CARE EDUCATION/TRAINING PROGRAM

## 2024-02-27 PROCEDURE — 99207 PR NO BILLABLE SERVICE THIS VISIT: CPT | Performed by: INTERNAL MEDICINE

## 2024-02-27 PROCEDURE — 84484 ASSAY OF TROPONIN QUANT: CPT | Performed by: STUDENT IN AN ORGANIZED HEALTH CARE EDUCATION/TRAINING PROGRAM

## 2024-02-27 PROCEDURE — C1769 GUIDE WIRE: HCPCS | Performed by: INTERNAL MEDICINE

## 2024-02-27 PROCEDURE — 200N000001 HC R&B ICU

## 2024-02-27 PROCEDURE — 93325 DOPPLER ECHO COLOR FLOW MAPG: CPT

## 2024-02-27 PROCEDURE — 85347 COAGULATION TIME ACTIVATED: CPT

## 2024-02-27 PROCEDURE — 96365 THER/PROPH/DIAG IV INF INIT: CPT

## 2024-02-27 PROCEDURE — B241ZZ3 ULTRASONOGRAPHY OF MULTIPLE CORONARY ARTERIES, INTRAVASCULAR: ICD-10-PCS | Performed by: INTERNAL MEDICINE

## 2024-02-27 PROCEDURE — 36415 COLL VENOUS BLD VENIPUNCTURE: CPT | Performed by: INTERNAL MEDICINE

## 2024-02-27 PROCEDURE — 84484 ASSAY OF TROPONIN QUANT: CPT | Performed by: INTERNAL MEDICINE

## 2024-02-27 PROCEDURE — 250N000013 HC RX MED GY IP 250 OP 250 PS 637: Performed by: STUDENT IN AN ORGANIZED HEALTH CARE EDUCATION/TRAINING PROGRAM

## 2024-02-27 PROCEDURE — 250N000013 HC RX MED GY IP 250 OP 250 PS 637: Performed by: INTERNAL MEDICINE

## 2024-02-27 PROCEDURE — 272N000001 HC OR GENERAL SUPPLY STERILE: Performed by: INTERNAL MEDICINE

## 2024-02-27 PROCEDURE — 80048 BASIC METABOLIC PNL TOTAL CA: CPT | Performed by: STUDENT IN AN ORGANIZED HEALTH CARE EDUCATION/TRAINING PROGRAM

## 2024-02-27 PROCEDURE — B2111ZZ FLUOROSCOPY OF MULTIPLE CORONARY ARTERIES USING LOW OSMOLAR CONTRAST: ICD-10-PCS | Performed by: INTERNAL MEDICINE

## 2024-02-27 PROCEDURE — C1760 CLOSURE DEV, VASC: HCPCS | Performed by: INTERNAL MEDICINE

## 2024-02-27 PROCEDURE — C9606 PERC D-E COR REVASC W AMI S: HCPCS | Performed by: INTERNAL MEDICINE

## 2024-02-27 PROCEDURE — B2181ZZ FLUOROSCOPY OF LEFT INTERNAL MAMMARY BYPASS GRAFT USING LOW OSMOLAR CONTRAST: ICD-10-PCS | Performed by: INTERNAL MEDICINE

## 2024-02-27 PROCEDURE — C1753 CATH, INTRAVAS ULTRASOUND: HCPCS | Performed by: INTERNAL MEDICINE

## 2024-02-27 PROCEDURE — 250N000011 HC RX IP 250 OP 636

## 2024-02-27 PROCEDURE — 02703ZZ DILATION OF CORONARY ARTERY, ONE ARTERY, PERCUTANEOUS APPROACH: ICD-10-PCS | Performed by: INTERNAL MEDICINE

## 2024-02-27 PROCEDURE — 93005 ELECTROCARDIOGRAM TRACING: CPT

## 2024-02-27 PROCEDURE — 250N000009 HC RX 250: Performed by: INTERNAL MEDICINE

## 2024-02-27 PROCEDURE — 93005 ELECTROCARDIOGRAM TRACING: CPT | Mod: 76

## 2024-02-27 PROCEDURE — 93321 DOPPLER ECHO F-UP/LMTD STD: CPT | Mod: 26 | Performed by: INTERNAL MEDICINE

## 2024-02-27 PROCEDURE — 93455 CORONARY ART/GRFT ANGIO S&I: CPT | Performed by: INTERNAL MEDICINE

## 2024-02-27 PROCEDURE — 99152 MOD SED SAME PHYS/QHP 5/>YRS: CPT

## 2024-02-27 PROCEDURE — C1887 CATHETER, GUIDING: HCPCS | Performed by: INTERNAL MEDICINE

## 2024-02-27 PROCEDURE — 250N000011 HC RX IP 250 OP 636: Performed by: STUDENT IN AN ORGANIZED HEALTH CARE EDUCATION/TRAINING PROGRAM

## 2024-02-27 PROCEDURE — 85025 COMPLETE CBC W/AUTO DIFF WBC: CPT | Performed by: STUDENT IN AN ORGANIZED HEALTH CARE EDUCATION/TRAINING PROGRAM

## 2024-02-27 PROCEDURE — 80061 LIPID PANEL: CPT | Performed by: INTERNAL MEDICINE

## 2024-02-27 PROCEDURE — 99153 MOD SED SAME PHYS/QHP EA: CPT

## 2024-02-27 DEVICE — CLOSURE ANGIOSEAL 6FR 610130: Type: IMPLANTABLE DEVICE | Status: FUNCTIONAL

## 2024-02-27 RX ORDER — ONDANSETRON 4 MG/1
4 TABLET, ORALLY DISINTEGRATING ORAL EVERY 6 HOURS PRN
Status: DISCONTINUED | OUTPATIENT
Start: 2024-02-27 | End: 2024-02-27

## 2024-02-27 RX ORDER — ACETAMINOPHEN 650 MG/1
650 SUPPOSITORY RECTAL EVERY 4 HOURS PRN
Status: DISCONTINUED | OUTPATIENT
Start: 2024-02-27 | End: 2024-02-27

## 2024-02-27 RX ORDER — NITROGLYCERIN 5 MG/ML
VIAL (ML) INTRAVENOUS
Status: DISCONTINUED | OUTPATIENT
Start: 2024-02-27 | End: 2024-02-27 | Stop reason: HOSPADM

## 2024-02-27 RX ORDER — AMOXICILLIN 250 MG
2 CAPSULE ORAL 2 TIMES DAILY PRN
Status: DISCONTINUED | OUTPATIENT
Start: 2024-02-27 | End: 2024-03-02 | Stop reason: HOSPADM

## 2024-02-27 RX ORDER — METOPROLOL TARTRATE 1 MG/ML
5 INJECTION, SOLUTION INTRAVENOUS
Status: DISCONTINUED | OUTPATIENT
Start: 2024-02-27 | End: 2024-02-27 | Stop reason: HOSPADM

## 2024-02-27 RX ORDER — HYDROMORPHONE HCL IN WATER/PF 6 MG/30 ML
0.2 PATIENT CONTROLLED ANALGESIA SYRINGE INTRAVENOUS
Status: DISCONTINUED | OUTPATIENT
Start: 2024-02-27 | End: 2024-02-27

## 2024-02-27 RX ORDER — NITROGLYCERIN 0.4 MG/1
TABLET SUBLINGUAL
DISCHARGE
Start: 2024-02-27

## 2024-02-27 RX ORDER — FENTANYL CITRATE 50 UG/ML
25 INJECTION, SOLUTION INTRAMUSCULAR; INTRAVENOUS
Status: DISCONTINUED | OUTPATIENT
Start: 2024-02-27 | End: 2024-02-27 | Stop reason: HOSPADM

## 2024-02-27 RX ORDER — NITROGLYCERIN 20 MG/100ML
10-200 INJECTION INTRAVENOUS CONTINUOUS
Status: DISCONTINUED | OUTPATIENT
Start: 2024-02-27 | End: 2024-02-27 | Stop reason: HOSPADM

## 2024-02-27 RX ORDER — AMOXICILLIN 250 MG
1 CAPSULE ORAL 2 TIMES DAILY PRN
Status: DISCONTINUED | OUTPATIENT
Start: 2024-02-27 | End: 2024-03-02 | Stop reason: HOSPADM

## 2024-02-27 RX ORDER — NALOXONE HYDROCHLORIDE 0.4 MG/ML
0.2 INJECTION, SOLUTION INTRAMUSCULAR; INTRAVENOUS; SUBCUTANEOUS
Status: ACTIVE | OUTPATIENT
Start: 2024-02-27 | End: 2024-02-27

## 2024-02-27 RX ORDER — AMOXICILLIN 250 MG
1 CAPSULE ORAL 2 TIMES DAILY PRN
Status: DISCONTINUED | OUTPATIENT
Start: 2024-02-27 | End: 2024-02-27

## 2024-02-27 RX ORDER — ASPIRIN 81 MG/1
81 TABLET, CHEWABLE ORAL DAILY
Status: DISCONTINUED | OUTPATIENT
Start: 2024-02-28 | End: 2024-03-01

## 2024-02-27 RX ORDER — PROCHLORPERAZINE MALEATE 5 MG
5 TABLET ORAL EVERY 6 HOURS PRN
Status: DISCONTINUED | OUTPATIENT
Start: 2024-02-27 | End: 2024-02-27

## 2024-02-27 RX ORDER — METOPROLOL TARTRATE 50 MG
50 TABLET ORAL 2 TIMES DAILY
Status: DISCONTINUED | OUTPATIENT
Start: 2024-02-27 | End: 2024-02-27 | Stop reason: HOSPADM

## 2024-02-27 RX ORDER — CALCIUM CARBONATE 500 MG/1
1000 TABLET, CHEWABLE ORAL 4 TIMES DAILY PRN
Status: DISCONTINUED | OUTPATIENT
Start: 2024-02-27 | End: 2024-02-27

## 2024-02-27 RX ORDER — OXYCODONE HYDROCHLORIDE 5 MG/1
5 TABLET ORAL EVERY 4 HOURS PRN
Status: DISCONTINUED | OUTPATIENT
Start: 2024-02-27 | End: 2024-02-27

## 2024-02-27 RX ORDER — ONDANSETRON 2 MG/ML
4 INJECTION INTRAMUSCULAR; INTRAVENOUS EVERY 6 HOURS PRN
Status: DISCONTINUED | OUTPATIENT
Start: 2024-02-27 | End: 2024-03-02 | Stop reason: HOSPADM

## 2024-02-27 RX ORDER — SODIUM CHLORIDE 9 MG/ML
INJECTION, SOLUTION INTRAVENOUS CONTINUOUS
Status: ACTIVE | OUTPATIENT
Start: 2024-02-27 | End: 2024-02-27

## 2024-02-27 RX ORDER — ACETAMINOPHEN 325 MG/1
650 TABLET ORAL EVERY 4 HOURS PRN
Status: DISCONTINUED | OUTPATIENT
Start: 2024-02-27 | End: 2024-02-27 | Stop reason: HOSPADM

## 2024-02-27 RX ORDER — ASPIRIN 81 MG/1
81 TABLET, CHEWABLE ORAL ONCE
Status: COMPLETED | OUTPATIENT
Start: 2024-02-27 | End: 2024-02-27

## 2024-02-27 RX ORDER — MORPHINE SULFATE 4 MG/ML
4 INJECTION, SOLUTION INTRAMUSCULAR; INTRAVENOUS ONCE
Status: DISCONTINUED | OUTPATIENT
Start: 2024-02-27 | End: 2024-02-27

## 2024-02-27 RX ORDER — NITROGLYCERIN 0.4 MG/1
0.4 TABLET SUBLINGUAL EVERY 5 MIN PRN
Status: DISCONTINUED | OUTPATIENT
Start: 2024-02-27 | End: 2024-02-27 | Stop reason: HOSPADM

## 2024-02-27 RX ORDER — ASPIRIN 81 MG/1
81 TABLET ORAL DAILY
Qty: 30 TABLET | Refills: 3 | Status: SHIPPED | OUTPATIENT
Start: 2024-02-28

## 2024-02-27 RX ORDER — OLMESARTAN MEDOXOMIL 20 MG/1
20 TABLET ORAL DAILY
Status: ON HOLD | COMMUNITY
Start: 2024-02-25 | End: 2024-03-02

## 2024-02-27 RX ORDER — POLYETHYLENE GLYCOL 3350 17 G/17G
17 POWDER, FOR SOLUTION ORAL 2 TIMES DAILY PRN
Status: DISCONTINUED | OUTPATIENT
Start: 2024-02-27 | End: 2024-03-02 | Stop reason: HOSPADM

## 2024-02-27 RX ORDER — ASPIRIN 81 MG/1
81 TABLET ORAL DAILY
Status: DISCONTINUED | OUTPATIENT
Start: 2024-02-28 | End: 2024-02-27 | Stop reason: HOSPADM

## 2024-02-27 RX ORDER — NITROGLYCERIN 20 MG/100ML
10-200 INJECTION INTRAVENOUS CONTINUOUS
Status: DISCONTINUED | OUTPATIENT
Start: 2024-02-27 | End: 2024-03-02

## 2024-02-27 RX ORDER — LIDOCAINE 40 MG/G
CREAM TOPICAL
Status: DISCONTINUED | OUTPATIENT
Start: 2024-02-27 | End: 2024-03-02 | Stop reason: HOSPADM

## 2024-02-27 RX ORDER — ACETAMINOPHEN 325 MG/1
650 TABLET ORAL EVERY 4 HOURS PRN
DISCHARGE
Start: 2024-02-27

## 2024-02-27 RX ORDER — ACETAMINOPHEN 325 MG/1
650 TABLET ORAL EVERY 4 HOURS PRN
Status: DISCONTINUED | OUTPATIENT
Start: 2024-02-27 | End: 2024-03-02 | Stop reason: HOSPADM

## 2024-02-27 RX ORDER — ACETAMINOPHEN 650 MG/1
650 SUPPOSITORY RECTAL EVERY 4 HOURS PRN
Status: DISCONTINUED | OUTPATIENT
Start: 2024-02-27 | End: 2024-03-02 | Stop reason: HOSPADM

## 2024-02-27 RX ORDER — PROCHLORPERAZINE 25 MG
12.5 SUPPOSITORY, RECTAL RECTAL EVERY 12 HOURS PRN
Status: DISCONTINUED | OUTPATIENT
Start: 2024-02-27 | End: 2024-02-27

## 2024-02-27 RX ORDER — LIDOCAINE 40 MG/G
CREAM TOPICAL
Status: DISCONTINUED | OUTPATIENT
Start: 2024-02-27 | End: 2024-02-27

## 2024-02-27 RX ORDER — METOPROLOL TARTRATE 50 MG
50 TABLET ORAL 2 TIMES DAILY
Status: COMPLETED | OUTPATIENT
Start: 2024-02-28 | End: 2024-02-28

## 2024-02-27 RX ORDER — HYDRALAZINE HYDROCHLORIDE 20 MG/ML
10 INJECTION INTRAMUSCULAR; INTRAVENOUS EVERY 4 HOURS PRN
Status: DISCONTINUED | OUTPATIENT
Start: 2024-02-27 | End: 2024-03-02 | Stop reason: HOSPADM

## 2024-02-27 RX ORDER — OXYCODONE HYDROCHLORIDE 10 MG/1
10 TABLET ORAL EVERY 4 HOURS PRN
Status: DISCONTINUED | OUTPATIENT
Start: 2024-02-27 | End: 2024-02-27 | Stop reason: HOSPADM

## 2024-02-27 RX ORDER — NALOXONE HYDROCHLORIDE 0.4 MG/ML
0.4 INJECTION, SOLUTION INTRAMUSCULAR; INTRAVENOUS; SUBCUTANEOUS
Status: ACTIVE | OUTPATIENT
Start: 2024-02-27 | End: 2024-02-27

## 2024-02-27 RX ORDER — AMOXICILLIN 250 MG
2 CAPSULE ORAL 2 TIMES DAILY PRN
Status: DISCONTINUED | OUTPATIENT
Start: 2024-02-27 | End: 2024-02-27

## 2024-02-27 RX ORDER — IOPAMIDOL 755 MG/ML
INJECTION, SOLUTION INTRAVASCULAR
Status: DISCONTINUED | OUTPATIENT
Start: 2024-02-27 | End: 2024-02-27 | Stop reason: HOSPADM

## 2024-02-27 RX ORDER — BISACODYL 10 MG
10 SUPPOSITORY, RECTAL RECTAL DAILY PRN
Status: DISCONTINUED | OUTPATIENT
Start: 2024-02-27 | End: 2024-02-27

## 2024-02-27 RX ORDER — OXYCODONE HYDROCHLORIDE 5 MG/1
5 TABLET ORAL EVERY 4 HOURS PRN
Status: DISCONTINUED | OUTPATIENT
Start: 2024-02-27 | End: 2024-02-27 | Stop reason: HOSPADM

## 2024-02-27 RX ORDER — PROCHLORPERAZINE 25 MG
12.5 SUPPOSITORY, RECTAL RECTAL EVERY 12 HOURS PRN
Status: DISCONTINUED | OUTPATIENT
Start: 2024-02-27 | End: 2024-03-02 | Stop reason: HOSPADM

## 2024-02-27 RX ORDER — HYDROMORPHONE HCL IN WATER/PF 6 MG/30 ML
0.4 PATIENT CONTROLLED ANALGESIA SYRINGE INTRAVENOUS
Status: DISCONTINUED | OUTPATIENT
Start: 2024-02-27 | End: 2024-02-27

## 2024-02-27 RX ORDER — SALIVA STIMULANT COMB. NO.3
2 SPRAY, NON-AEROSOL (ML) MUCOUS MEMBRANE
Status: DISCONTINUED | OUTPATIENT
Start: 2024-02-27 | End: 2024-02-27

## 2024-02-27 RX ORDER — ONDANSETRON 2 MG/ML
4 INJECTION INTRAMUSCULAR; INTRAVENOUS EVERY 6 HOURS PRN
Status: DISCONTINUED | OUTPATIENT
Start: 2024-02-27 | End: 2024-02-27

## 2024-02-27 RX ORDER — PROCHLORPERAZINE MALEATE 5 MG
5 TABLET ORAL EVERY 6 HOURS PRN
Status: DISCONTINUED | OUTPATIENT
Start: 2024-02-27 | End: 2024-03-02 | Stop reason: HOSPADM

## 2024-02-27 RX ORDER — FLUMAZENIL 0.1 MG/ML
0.2 INJECTION, SOLUTION INTRAVENOUS
Status: ACTIVE | OUTPATIENT
Start: 2024-02-27 | End: 2024-02-27

## 2024-02-27 RX ORDER — ACETAMINOPHEN 325 MG/1
650 TABLET ORAL EVERY 4 HOURS PRN
Status: DISCONTINUED | OUTPATIENT
Start: 2024-02-27 | End: 2024-02-27

## 2024-02-27 RX ORDER — ATROPINE SULFATE 0.1 MG/ML
0.5 INJECTION INTRAVENOUS
Status: ACTIVE | OUTPATIENT
Start: 2024-02-27 | End: 2024-02-27

## 2024-02-27 RX ORDER — HYDRALAZINE HYDROCHLORIDE 10 MG/1
10 TABLET, FILM COATED ORAL EVERY 4 HOURS PRN
Status: DISCONTINUED | OUTPATIENT
Start: 2024-02-27 | End: 2024-03-02 | Stop reason: HOSPADM

## 2024-02-27 RX ORDER — LOSARTAN POTASSIUM 50 MG/1
50 TABLET ORAL DAILY
Status: DISCONTINUED | OUTPATIENT
Start: 2024-02-27 | End: 2024-02-27 | Stop reason: HOSPADM

## 2024-02-27 RX ORDER — METOPROLOL TARTRATE 50 MG
50 TABLET ORAL DAILY
Status: ON HOLD | COMMUNITY
End: 2024-03-02

## 2024-02-27 RX ORDER — HYDRALAZINE HYDROCHLORIDE 20 MG/ML
10 INJECTION INTRAMUSCULAR; INTRAVENOUS EVERY 4 HOURS PRN
Status: DISCONTINUED | OUTPATIENT
Start: 2024-02-27 | End: 2024-02-27 | Stop reason: HOSPADM

## 2024-02-27 RX ORDER — ONDANSETRON 4 MG/1
4 TABLET, ORALLY DISINTEGRATING ORAL EVERY 6 HOURS PRN
Status: DISCONTINUED | OUTPATIENT
Start: 2024-02-27 | End: 2024-02-27 | Stop reason: HOSPADM

## 2024-02-27 RX ORDER — HEPARIN SODIUM 1000 [USP'U]/ML
INJECTION, SOLUTION INTRAVENOUS; SUBCUTANEOUS
Status: DISCONTINUED | OUTPATIENT
Start: 2024-02-27 | End: 2024-02-27 | Stop reason: HOSPADM

## 2024-02-27 RX ORDER — FENTANYL CITRATE 50 UG/ML
INJECTION, SOLUTION INTRAMUSCULAR; INTRAVENOUS
Status: DISCONTINUED | OUTPATIENT
Start: 2024-02-27 | End: 2024-02-27 | Stop reason: HOSPADM

## 2024-02-27 RX ORDER — LOSARTAN POTASSIUM 50 MG/1
50 TABLET ORAL DAILY
Status: DISCONTINUED | OUTPATIENT
Start: 2024-02-28 | End: 2024-03-02 | Stop reason: HOSPADM

## 2024-02-27 RX ORDER — ONDANSETRON 2 MG/ML
4 INJECTION INTRAMUSCULAR; INTRAVENOUS EVERY 6 HOURS PRN
Status: DISCONTINUED | OUTPATIENT
Start: 2024-02-27 | End: 2024-02-27 | Stop reason: HOSPADM

## 2024-02-27 RX ORDER — ONDANSETRON 4 MG/1
4 TABLET, ORALLY DISINTEGRATING ORAL EVERY 6 HOURS PRN
Status: DISCONTINUED | OUTPATIENT
Start: 2024-02-27 | End: 2024-03-02 | Stop reason: HOSPADM

## 2024-02-27 RX ORDER — MORPHINE SULFATE 2 MG/ML
INJECTION, SOLUTION INTRAMUSCULAR; INTRAVENOUS
Status: COMPLETED
Start: 2024-02-27 | End: 2024-02-27

## 2024-02-27 RX ORDER — POLYETHYLENE GLYCOL 3350 17 G/17G
17 POWDER, FOR SOLUTION ORAL 2 TIMES DAILY PRN
Status: DISCONTINUED | OUTPATIENT
Start: 2024-02-27 | End: 2024-02-27

## 2024-02-27 RX ADMIN — NITROGLYCERIN 60 MCG/MIN: 20 INJECTION INTRAVENOUS at 10:25

## 2024-02-27 RX ADMIN — ASPIRIN 81 MG 81 MG: 81 TABLET ORAL at 14:04

## 2024-02-27 RX ADMIN — POLYETHYLENE GLYCOL 3350 17 G: 17 POWDER, FOR SOLUTION ORAL at 22:38

## 2024-02-27 RX ADMIN — MORPHINE SULFATE 4 MG: 2 INJECTION, SOLUTION INTRAMUSCULAR; INTRAVENOUS at 08:29

## 2024-02-27 RX ADMIN — NITROGLYCERIN 60 MCG/MIN: 20 INJECTION INTRAVENOUS at 22:25

## 2024-02-27 RX ADMIN — NITROGLYCERIN 70 MCG/MIN: 20 INJECTION INTRAVENOUS at 08:51

## 2024-02-27 RX ADMIN — LOSARTAN POTASSIUM 50 MG: 50 TABLET, FILM COATED ORAL at 14:04

## 2024-02-27 RX ADMIN — TICAGRELOR 90 MG: 90 TABLET ORAL at 20:08

## 2024-02-27 RX ADMIN — NITROGLYCERIN 60 MCG/MIN: 20 INJECTION INTRAVENOUS at 20:07

## 2024-02-27 RX ADMIN — HEPARIN SODIUM 6500 UNITS: 1000 INJECTION, SOLUTION INTRAVENOUS; SUBCUTANEOUS at 08:16

## 2024-02-27 RX ADMIN — TICAGRELOR 180 MG: 90 TABLET ORAL at 08:17

## 2024-02-27 RX ADMIN — SODIUM CHLORIDE: 9 INJECTION, SOLUTION INTRAVENOUS at 14:05

## 2024-02-27 RX ADMIN — NITROGLYCERIN 10 MCG/MIN: 20 INJECTION INTRAVENOUS at 08:22

## 2024-02-27 RX ADMIN — METOPROLOL TARTRATE 50 MG: 50 TABLET, FILM COATED ORAL at 14:04

## 2024-02-27 RX ADMIN — METOPROLOL TARTRATE 50 MG: 50 TABLET, FILM COATED ORAL at 20:08

## 2024-02-27 ASSESSMENT — ACTIVITIES OF DAILY LIVING (ADL)
ADLS_ACUITY_SCORE: 22
ADLS_ACUITY_SCORE: 39
ADLS_ACUITY_SCORE: 38
ADLS_ACUITY_SCORE: 22
ADLS_ACUITY_SCORE: 23
ADLS_ACUITY_SCORE: 22
ADLS_ACUITY_SCORE: 38
ADLS_ACUITY_SCORE: 38
ADLS_ACUITY_SCORE: 22
ADLS_ACUITY_SCORE: 38
ADLS_ACUITY_SCORE: 22
ADLS_ACUITY_SCORE: 38

## 2024-02-27 ASSESSMENT — COLUMBIA-SUICIDE SEVERITY RATING SCALE - C-SSRS
6. HAVE YOU EVER DONE ANYTHING, STARTED TO DO ANYTHING, OR PREPARED TO DO ANYTHING TO END YOUR LIFE?: NO
2. HAVE YOU ACTUALLY HAD ANY THOUGHTS OF KILLING YOURSELF IN THE PAST MONTH?: NO
2. HAVE YOU ACTUALLY HAD ANY THOUGHTS OF KILLING YOURSELF IN THE PAST MONTH?: NO
1. IN THE PAST MONTH, HAVE YOU WISHED YOU WERE DEAD OR WISHED YOU COULD GO TO SLEEP AND NOT WAKE UP?: NO
6. HAVE YOU EVER DONE ANYTHING, STARTED TO DO ANYTHING, OR PREPARED TO DO ANYTHING TO END YOUR LIFE?: NO
1. IN THE PAST MONTH, HAVE YOU WISHED YOU WERE DEAD OR WISHED YOU COULD GO TO SLEEP AND NOT WAKE UP?: NO

## 2024-02-27 NOTE — ED TRIAGE NOTES
Patient arrives with [facility]- patient has 2 days of CP but woke up today with SOB. Patient took 3 nitroglycerin and EMS gave 3 nitroglycerin PTA.

## 2024-02-27 NOTE — Clinical Note
The first balloon was inserted into the ramus.Max pressure = 6 kel. Total duration = 22 seconds.     Max pressure = 8 kel. Total duration = 14 seconds.    Balloon reinflated a second time: Max pressure = 8 kel. Total duration = 14 seconds.  Balloon reinflated a third time: Max pressure = 8 kel. Total duration = 14 seconds.  Balloon reinflated a fourth time: Max pressure = 8 kel. Total duration = 14 seconds.

## 2024-02-27 NOTE — Clinical Note
The first balloon was inserted into the ramus and ramus.Max pressure = 10 kel. Total duration = 30 seconds.

## 2024-02-27 NOTE — Clinical Note
The first balloon was inserted into the ramus.Max pressure = 8 kel. Total duration = 30 seconds.     Max pressure = 15 kel. Total duration = 25 seconds.    Balloon reinflated a second time: Max pressure = 15 kel. Total duration = 25 seconds.  Balloon reinflated a third time: Max pressure = 15 kel. Total duration = 25 seconds.

## 2024-02-27 NOTE — ED PROVIDER NOTES
"  History     Chief Complaint:  Chest Pain        The history is provided by the patient.      Lokesh Shipley Jr. is a 73 year old male with history of coronary artery disease, hypertension, hyperlipidemia, myocardial infarction, prior CABG, who presents to the ED via EMS for evaluation of chest pain. The patient reports sudden onset of shortness of breath and chest pain this morning. He describes feeling \"chest pressure\" and rates his pain at 7 out of 10. He states that the pain is localized to the sternal region and nonradiating. The chest pain was not exacerbated by any activity. The patient took 3 nitroglycerin and EMS administered 3 nitroglycerin en route. EMS reports blood pressure reading of 122/78. The patient endorses frequent alcohol use, shortness of breath, and weakness in the upper extremities. He denies vomiting, diffuse diaphoresis, fever, cough, back pain, edema, or pain in the lower extremities. He denies taking blood thinners. The patient has a history of a previous myocardial infarction with Stent in 1998 and CABG surgery in 2009. He reports he has experienced this pain previously.            Independent Historian:    None - Patient Only    Review of External Notes:  None    Allergies:  Penicillins  Sulfa Antibiotics    Physical Exam   Patient Vitals for the past 24 hrs:   BP Temp Temp src Pulse Resp SpO2 Height Weight   02/27/24 1445 115/73 -- -- 64 14 95 % -- --   02/27/24 1430 117/64 -- -- 73 11 97 % -- --   02/27/24 1400 125/77 -- -- 78 13 94 % -- --   02/27/24 1345 123/72 -- -- 79 16 95 % -- --   02/27/24 1330 119/75 -- -- 75 12 95 % -- --   02/27/24 1315 131/78 -- -- 77 10 95 % -- --   02/27/24 1300 131/76 -- -- 79 14 92 % -- --   02/27/24 1245 124/71 -- -- 82 12 93 % -- --   02/27/24 1230 125/81 -- -- 77 19 96 % -- --   02/27/24 1215 117/69 -- -- 75 10 -- 1.753 m (5' 9\") 93.4 kg (205 lb 14.6 oz)   02/27/24 1213 117/72 97.7  F (36.5  C) Oral 77 10 94 % -- --   02/27/24 1142 111/64 -- -- " 74 16 99 % -- --   02/27/24 1128 102/62 -- -- 72 16 -- -- --   02/27/24 1114 115/67 -- -- 74 16 -- -- --   02/27/24 1041 -- -- -- -- 10 -- -- --   02/27/24 1031 -- -- -- -- 15 -- -- --   02/27/24 1021 -- -- -- -- 14 -- -- --   02/27/24 0958 -- -- -- -- 20 -- -- --   02/27/24 0944 -- -- -- -- (!) 7 -- -- --   02/27/24 0933 -- -- -- -- (!) 4 -- -- --   02/27/24 0918 -- -- -- -- (!) 9 -- -- --   02/27/24 0908 -- -- -- -- (!) 9 -- -- --   02/27/24 0858 -- -- -- -- 12 -- -- --   02/27/24 0846 -- -- -- -- 18 -- -- --   02/27/24 0826 133/78 -- -- -- -- -- -- --   02/27/24 0817 (!) 140/75 -- -- -- -- -- -- --   02/27/24 0810 (!) 149/83 -- -- 75 -- -- -- --   02/27/24 0804 (!) 155/77 97.4  F (36.3  C) Axillary 76 24 97 % -- --   02/27/24 0700 -- -- -- -- -- -- -- 95.2 kg (209 lb 14.1 oz)        Physical Exam  GENERAL: Patient appears in moderate distress due to feeling uncomfortable.  HEAD: Atraumatic.  NECK: No rigidity  CV: RRR, no murmurs rubs or gallops  PULM: CTAB with good aeration; no retractions, rales, rhonchi, or wheezing  ABD: Soft, nontender, nondistended, no guarding  DERM: No rash. Skin warm and dry  EXTREMITY: Moving all extremities without difficulty. No calf tenderness or peripheral edema  VASCULAR: Symmetric pulses bilaterally     Emergency Department Course   ECG    ECG results from 02/27/24   EKG 12 lead     Value    Systolic Blood Pressure     Diastolic Blood Pressure     Ventricular Rate 77    Atrial Rate 77    MA Interval 226    QRS Duration 112        QTc 448    P Axis 60    R AXIS 7    T Axis 94    Interpretation ECG      Sinus rhythm with 1st degree A-V block  Possible Inferior infarct (cited on or before 20-FEB-2009)  Marked ST abnormality, possible lateral subendocardial injury  Abnormal ECG  When compared with ECG of 12-MAR-2020 14:54,  ST more depressed Lateral leads  Nonspecific T wave abnormality no longer evident in Lateral leads  Unconfirmed report - interpretation of this ECG is  computer generated - see medical record for final interpretation  Taken at 0802. Read at 0804 by Norberto Branch MD.   EKG 12 lead     Value    Systolic Blood Pressure     Diastolic Blood Pressure     Ventricular Rate 74    Atrial Rate 74    MD Interval 212    QRS Duration 110        QTc 441    P Axis 69    R AXIS 2    T Axis 98    Interpretation ECG      Sinus rhythm with 1st degree A-V block with occasional Premature ventricular complexes  Possible Inferior infarct (cited on or before 20-FEB-2009)  Cannot rule out Anterior infarct , age undetermined  Marked ST abnormality, possible lateral subendocardial injury  Abnormal ECG  When compared with ECG of 27-FEB-2024 08:02, (unconfirmed)  Premature ventricular complexes are now Present  Minimal criteria for Anterior infarct are now Present  Unconfirmed report - interpretation of this ECG is computer generated - see medical record for final interpretation  Taken at 0806. Read at 0806 by Norberto Branch MD.        Laboratory: Imaging:   Labs Ordered and Resulted from Time of ED Arrival to Time of ED Departure   ISTAT BASIC CHEM ICA HEMATOCRIT POCT - Abnormal       Result Value    Chloride POCT 103      Potassium POCT 4.3      Sodium POCT 140      UREA NITROGEN POCT 17      Calcium, Ionized Whole Blood POCT 4.7      Glucose Whole Blood POCT 115 (*)     Anion Gap POCT 15.0 (*)     Hemoglobin POCT 13.9      Hematocrit POCT 41      Creatinine POCT 0.8      TOTAL CO2 POCT 27     CBC WITH PLATELETS AND DIFFERENTIAL    WBC Count 5.6      RBC Count 4.71      Hemoglobin 14.0      Hematocrit 42.1      MCV 89      MCH 29.7      MCHC 33.3      RDW 11.9      Platelet Count 326      % Neutrophils 54      % Lymphocytes 28      % Monocytes 13      % Eosinophils 3      % Basophils 1      % Immature Granulocytes 1      NRBCs per 100 WBC 0      Absolute Neutrophils 3.1      Absolute Lymphocytes 1.6      Absolute Monocytes 0.7      Absolute Eosinophils 0.2      Absolute Basophils 0.0       Absolute Immature Granulocytes 0.0      Absolute NRBCs 0.0       Cardiac Catheterization   Final Result      Echocardiogram Limited   Final Result      XR Chest Port 1 View   Final Result   IMPRESSION: No acute cardiopulmonary disease.      SHANTELLE RAE MD            SYSTEM ID:  [id number]              Procedures   None    Emergency Department Course & Assessments:         Interventions:  Medications   nitroGLYcerin 50 mg in D5W 250 mL (adult std) infusion CENTRAL (60 mcg/min Intravenous Rate/Dose Verify 2/27/24 1208)   metoprolol tartrate (LOPRESSOR) tablet 50 mg (50 mg Oral $Given 2/27/24 1404)   losartan (COZAAR) tablet 50 mg (50 mg Oral $Given 2/27/24 1404)   sodium chloride 0.9% BOLUS 250 mL (has no administration in time range)   atropine injection 0.5 mg (has no administration in time range)   fentaNYL (PF) (SUBLIMAZE) injection 25 mcg (has no administration in time range)   midazolam (VERSED) injection 0.5 mg (has no administration in time range)   naloxone (NARCAN) injection 0.2 mg (has no administration in time range)     Or   naloxone (NARCAN) injection 0.4 mg (has no administration in time range)     Or   naloxone (NARCAN) injection 0.2 mg (has no administration in time range)     Or   naloxone (NARCAN) injection 0.4 mg (has no administration in time range)   flumazenil (ROMAZICON) injection 0.2 mg (has no administration in time range)   sodium chloride 0.9 % infusion ( Intravenous $New Bag 2/27/24 1405)   HOLD: Metformin and metformin containing medications on day of procedure and 48 hours after IV contrast given for patients with acute kidney injury or severe chronic kidney disease (stage IV or stage V; i.e., eGFR less than 30) (has no administration in time range)   hydrALAZINE (APRESOLINE) injection 10 mg (has no administration in time range)   metoprolol (LOPRESSOR) injection 5 mg (has no administration in time range)   nitroGLYcerin (NITROSTAT) sublingual tablet 0.4 mg (has no administration in time  range)   aspirin EC tablet 81 mg (has no administration in time range)   acetaminophen (TYLENOL) tablet 650 mg (has no administration in time range)   oxyCODONE (ROXICODONE) tablet 5 mg (has no administration in time range)     Or   oxyCODONE IR (ROXICODONE) tablet 10 mg (has no administration in time range)   ondansetron (ZOFRAN ODT) ODT tab 4 mg (has no administration in time range)     Or   ondansetron (ZOFRAN) injection 4 mg (has no administration in time range)   Percutaneous Coronary Intervention orders placed (this is information for BPA alerting) (has no administration in time range)   ticagrelor (BRILINTA) tablet 90 mg (has no administration in time range)   Continuing beta blocker from home medication list OR beta blocker order already placed during this visit (has no administration in time range)   Reason statin not prescribed (has no administration in time range)   heparin (porcine) injection 1000 units/mL (rounds in 500 unit increments) (6,500 Units Intravenous $Given 2/27/24 0816)   ticagrelor (BRILINTA) tablet 180 mg (180 mg Oral $Given 2/27/24 0817)   morphine (PF) 2 MG/ML injection (4 mg  $Given 2/27/24 0829)   aspirin (ASA) chewable tablet 81 mg (81 mg Oral $Given 2/27/24 1404)        Assessments, Independent Interpretation, Consult/Discussion of ManagementTests:   Chest x-ray-no mediastinal widening.  No pneumonia or pneumothorax.  0758 I obtained patient history and performed a physical exam.  0817 I spoke with Dr. Gaffney, Cardiology, regarding the patient's history and presentation in the emergency department today.    0820 I spoke with Dr. Rosales, Interventional Cardiology, regarding the patient's history and presentation in the emergency department today.      Social Determinants of Health affecting care:  None    Disposition:  The patient  was transferred to cath lab.    Impression & Plan         Medical Decision Making:  Symptoms consistent with STEMI equivalent.     Chronic conditions  complicating - CAD    ECG interpreted by me showing elevation in aVR and diffuse ST depressions, consistent with a STEMI equivalent.    Cath Lab activated    IV access was quickly obtained. Patient was placed on the monitor and cardiac pads.    Discussed with cardiologist     Portable chest x-ray independently interpreted and was nonacute.     Performed bedside ultrasound and interpreted to show -reasonable cardiac squeeze.    DDx considered, but not limited to PE, aortic dissection, pericardial tamponade, however evaluation not consistent with these etiologies.    Obtained labs.  Troponin 31.    Given 324 mg aspirin and Brilinta.    Started heparin drip.      Patient promptly taken to Cath Lab for definitive care in stable condition.          Diagnosis:    ICD-10-CM    1. ST elevation myocardial infarction involving left main coronary artery (H)  I21.01 Cardiac Rehab  Referral     aspirin 81 MG EC tablet      2. STEMI (ST elevation myocardial infarction) (H)  I21.3 Cardiac Catheterization     Cardiac Catheterization           Discharge Medications:  Current Discharge Medication List        START taking these medications    Details   aspirin 81 MG EC tablet Take 1 tablet (81 mg) by mouth daily Start tomorrow.  Qty: 30 tablet, Refills: 3    Associated Diagnoses: ST elevation myocardial infarction involving left main coronary artery (H)              Scribe Disclosure:  Luna MORA, am serving as a scribe at 8:14 AM on 2/27/2024 to document services personally performed by Norberto Branch MD based on my observations and the provider's statements to me.   2/27/2024   No att. providers found         Norberto Branch MD  02/27/24 3427

## 2024-02-27 NOTE — Clinical Note
Max pressure = 15 kel. Total duration = 8 seconds.     Max pressure = 15 kel. Total duration = 10 seconds.    Balloon reinflated a second time: Max pressure = 15 kel. Total duration = 10 seconds.  Balloon reinflated a third time: Max pressure = 18 kel. Total duration = 10 seconds.

## 2024-02-27 NOTE — Clinical Note
Pre-calculated contrast dose 377 ml asking for order for covid test.  having procedure in [facility] Monday. ok to provide order?

## 2024-02-27 NOTE — CONSULTS
[facility]    Cardiology Consult Note- Cardiology    Date of Admission:  2/27/2024  Reason for Consult: Acute myocardial infarction critically ill    History of Present Illness   Lokesh Shipley Jr. is a 73 year old male admitted on 2/27/2024.  This is an emergency cardiac consultation and angioplasty results the patient was seen before but the dictation is after the angioplasty this is a highly complex situation.  This is a 73-year-old patient who unfortunately we have to accentuate how noncompliant there is been issues in the past with medications.  Has been patient of my partner Dr. Jeramy Das but has not seen in over 10 years when he did not come back for follow-up.  I do not have all the notes but at some distant point more than 10 years ago he had an inferior myocardial infarction treated at [facility] we do not have all the details but we believe a stent was placed to the right coronary artery    In 2009 the patient had angiogram was seen by Dr. Figueroa from cardiovascular surgery.  Dr. Figueroa note mentions that the patient has been noncompliant for years with no beta-blocker no statin and no ACE inhibitor.  The patient did have a history of statin intolerance but was never offered PCSK9 because he never came back for follow-up.    Dr. figueroa took to the patient to surgery in 2009 with an RACHEL graft LAD vein graft to diagonal and a vein graft to the right coronary artery.  I had the opportunity review those 2009 angiogram pictures there was a very very large ramus branch which is actually larger than the circumflex or the LAD it had mild ostial narrowing.    As a mention the patient was last seen by Dr. Jeramy Das in 2013 he has never come back for follow-up.  Coincidentally the patient just saw his [facility] internist last week who again mention the.  We are not certain exactly which medicines he is taking but we know he is not taking aspirin he is not on a  "statin.  We think he is taking a beta-blocker and ARB     Coincidently the patient just saw his internist who reminded him that he should be seen his cardiologist but he declined that    The patient started having chest pain about 2 days ago on and off it was so terrible today he could not stay home and came in he reported \"15 out of 10 chest pain on presentation he was continuously moaning in pain very uncomfortable and it was hard to get further history he did note that he was also short of breath with this        The patient was brought urgently to Cath Lab as I mention I will dictate what we saw basically this is a complex difficult to follow coronary anatomy.    The bypass grafts are as described above I could not selectively intubate the RACHEL graft but I got it good enough picture the ostium of the LIMA into the LAD is narrowed the rest of the graft fills out the LAD nicely but it was not that large of an LAD because of this large ramus.  The vein graft to the diagonal is patent but the vein graft itself has an ostial narrowing which probably could be an angioplastied just as the LIMA could be angioplastied the vein graft to the right coronary is widely patent but there is a new narrowing with a subtotal PDA after the graft insertion.  On the native arteries the right coronary is 100% occluded proximally the left main artery is open but the ramus which was the largest vessel and the heart is totally clear the origin and there is a subtotal narrowing in the proximal left circumflex such that on the native injection we can see the small OM1 but what is not seen unless 1 looks at the old pictures or looks closely at the new pictures there is a small to moderate size OM 2 which leaves AV groove it is large enough for bypass.  The LAD diagonal is essentially occluded from the native injection    Due to the critical nature of the patient's pain although I did call Dr. Levon livingston from cardiovascular surgery we elected " to start complex angioplasty.  The iliac system was tortuous and as a mention we cannot selectively inject the LIMA even though we pictures that describe anatomy it was obvious that the ramus was the infarct vessel.  I was able to eventually wire the ramus when I open the proximal ramus it revealed that there was also severe subtotal narrowing the ramus two thirds of the way down.  I ended up using cutting balloons to the mid to distal ramus and Cutting Balloon to the proximal ostial ramus.    I chose not to stent for several reasons #1 it is not clear to me about compliance issues as a mention he is not even on aspirin and he was not on his heart medicines after his angioplasty at [facility] many years ago and then the he has not been compliant with his medicines since his bypass surgery (at least some of them.  The next issue is if I was to stent the ramus I have to cross from the left main into the ramus which would include or cover the left circumflex which would not allow me to possibly fix it so if the patient does not go to bypass surgery I would recommend that we stand the left circumflex and the proximal and midportion to open up the OM 2 then we could stent the mid to distal ramus branch then we could stent the left main into the ostial ramus branch we could come back electively another time to stent the ostium of the vein graft to diagonal and the ostium of the LIMA to the LAD and we could contemplate perhaps angioplasty and the right PDA through the vein graft although I suspect that that is not that large of the vessel alternately if he goes to bypass surgery I do recommend possible grafting to the diagonal of the LAD the distal ramus OM 2 and perhaps the PDA although I do not think it is large enough but it might be large enough to place a stent via the vein graft.  Again the issue here is about compliance and what is best for the patient this many stents in the patient who is not can be on blood  thinners or at least has a history of not taking blood thinners even aspirin is treacherous although again bypass surgery has the same potential issues have to have a clinical meeting with the patient and his wife and family about next steps currently his chest pain is down to a 3 as a mention EKG although still abnormal is vastly improved    Assessment & Plan: HVSL   As mentioned above I did Cutting Balloon to the ostium of the ramus and Cutting Balloon to the mid ramus.  The circumflex is occluded.  As a mention if I stent the ramus it may block access to get into the circumflex.  The patient will be going to [facility] when a bed opens up and will be seen formally by cardiovascular surgery to talk about redo LAD diagonal ramus OM 2 and perhaps the PDA versus many staged angioplasty with us.  If that is the case I would start with the left circumflex first fixed with a balloon and stent and then probably in the same setting stent the mid ramus and stent the proximal ostial ramus back into the left main.    At a later time I would approach via the left arm and probably stent the ostium of the LIMA graft and then at some point to stent the ostium of the vein graft to the diagonal and consider ballooning and stenting the PDA going through the vein graft.  All this of course is centered on if he is going to be compliant with anticoagula's antiplatelet start him on PCSK9 treating his blood pressure with standard medications.  This was discussed with Dr. Gaffney from cardiology and the hospitalist at at [facility] and we can talk to the cardiologist at [facility] when he is transferred there   This was critical care time 45 minutes above and beyond the angioplasty talking to the [facility] doctor talking Dr. Gaffney cardiology at at [facility] talking to the the hospitalist at [place]  In addition I reviewed this with my partner Dr. Miranda at [facility] via the phone and sharing the pictures from 2009  and the current ones and we had a plan as I outlined above    Moe Rosales MD  ______________________________________________________________________    Past Medical History    Past Medical History:   Diagnosis Date    Coronary artery disease     Heart attack (H) 2000    Hypertension     Other chronic pain     low back and radiates down both legs.  Also, numbness & tingling down both legs.    Stented coronary artery 2000    x1       Past Surgical History   Past Surgical History:   Procedure Laterality Date    angiogram with stent placement      BACK SURGERY      low back discectomy    CARDIAC SURGERY  2009    CABG x 3    OPTICAL TRACKING SYSTEM FUSION SPINE POSTERIOR LUMBAR TWO LEVELS N/A 3/12/2020    Procedure: L3-4 and L4-5 decompressive laminectomies with medial facetectomies with decompression and exposure of the L3, L4 and L5 roots bilaterally  Placement of Nuvasive Reline traction pedicle screws bilaterally from L3-5  L3-5 posterior lateral fusion with placement of locally harvested autologous bone;  Surgeon: Jose Lundberg MD;  Location:  OR       Family History   Both parents have passed away both parents had coronary artery disease there is a sister who does not have coronary disease    Social History the patient is  there is a reference to excess alcohol talking to him he sometimes drinks modest rarely heavy and sometimes not at all so it is not clear to me how much alcohol he drinks he is a non-smoker lifetime and caffeine is 3/day.  He is still working doing snowYOOWALK and lawn work  Social History     Socioeconomic History    Marital status:    Tobacco Use    Smoking status: Never    Smokeless tobacco: Never   Substance and Sexual Activity    Alcohol use: Yes     Comment: 0-4 weekly    Drug use: Never        Medications   Medications Prior to Admission   Medication Sig Dispense Refill Last Dose    acetaminophen (TYLENOL) 500 MG tablet Take 500-1,000 mg by mouth every 6  hours as needed for mild pain       methocarbamol (ROBAXIN) 500 MG tablet Take 1 tablet (500 mg) by mouth every 8 hours as needed for muscle spasms 40 tablet 0     metoprolol (LOPRESSOR) 50 MG tablet Take 1 tablet (50 mg) by mouth 2 times daily 180 tablet 3     multivitamin w/minerals (MULTI-VITAMIN) tablet Take 1 tablet by mouth daily       nitroGLYcerin (NITROSTAT) 0.4 MG sublingual tablet Place 0.4 mg under the tongue every 5 minutes as needed for chest pain For chest pain place 1 tablet under the tongue every 5 minutes for 3 doses. If symptoms persist 5 minutes after 1st dose call 911.       olmesartan (BENICAR) 40 MG tablet Take 20 mg by mouth daily        oxyCODONE (ROXICODONE) 5 MG tablet Take 1-2 tablets (5-10 mg) by mouth every 3 hours as needed for moderate to severe pain 10 tablet 0     tadalafil (CIALIS) 10 MG tablet 1 tab po prn as directed 9 tablet 0        Allergies    Allergies   Allergen Reactions    Pcn [Penicillins] Shortness Of Breath    Sulfa Antibiotics Shortness Of Breath        Review of Systems    Skin: Due to the emergent nature here in the the patient was in so much pain he cannot talk this is a very limited review of system he states he was in his usual health until 2 days ago I cannot comment further on the review of systems  Eyes: 0  ENT: 00  Respiratory: 0  Cardiovascular: See above HPI  Gastroenterology: 0  Genitourinary: 0  Musculoskeletal: 00  Neurologic: 0  Psychiatric: 0  Heme/Lymph/Imm: 0  Endocrine: 0    Physical Exam   Vitals: /78   Pulse 75   Temp 97.4  F (36.3  C) (Axillary)   Resp 10   Wt 95.2 kg (209 lb 14.1 oz)   SpO2 97%   BMI 30.11 kg/m    Constitutional: Continuously moaning in pain to the point where he cannot get a good history and physical  Skin: No rash  Head: No trauma  Eyes: Nonicteric  Lymph: No cervical adenopathy  ENT: Negative  Neck: Normal carotid upstrokes no masses no thyromegaly  Respiratory: Clear anteriorly and laterally but because of the  moaning very difficult to hear  Cardiac: Regular rate and rhythm with no obvious murmur but again because of the continuous moaning difficult to hear  GI: Positive bowel sounds nontender abdomen  Extremities and Muscular Skeletal: No significant edema  Neurological: Nonfocal  Psych: Normal    Medical Decision Making             Data     I have personally reviewed the following data over the past 24 hrs:    5.6  \   13.9   / 326     140 103 17 /  115 (H)   4.3 26 0.8 \     Trop: 31 (H) BNP: N/A         Imaging results reviewed over the past 24 hrs:   Recent Results (from the past 24 hour(s))   XR Chest Port 1 View    Narrative    CHEST PORTABLE ONE VIEW 2024 8:22 AM     HISTORY: Chest pain.    COMPARISON: 2021.      Impression    IMPRESSION: No acute cardiopulmonary disease.   Echocardiogram Limited    Narrative    [id number]  [id number]  [id number]  [id number]^ANURAG^JEMMA^KLEBER     [facility]  Echocardiography Laboratory  [address] [place] [address]     Name: JR. KINDRA ALFORD JR.  MRN: [id number]  : 1950  Study Date: 2024 09:25 AM  Age: 73 yrs  Gender: Male  Patient Location: [facility]  Reason For Study: CAD  Ordering Physician: JEMMA OSBORN  Referring Physician: Zahira Morales PA-C  Performed By: Garland Jamison RDCS     BSA: 2.1 m2  Height: 70 in  Weight: 209 lb  ______________________________________________________________________________  Procedure  Limited Portable Echo Adult. (Emergent exam, abbreviated study performed).  ______________________________________________________________________________  Interpretation Summary     Limited echocardiogram performed emergently in the Cath Lab.  Normal left ventricular size with moderately reduced systolic function.  Visually estimated LVEF 40-45%.  Inferolateral, distal lateral and apical hypokinesis. Limited views obtained.  Grossly normal right ventricular systolic function.  Cardiac valves not  well-visualized.  No pericardial effusion.     There is no comparison study available.  ______________________________________________________________________________  ______________________________________________________________________________  Report approved by:  [facility] Hospitalchika Freemaninon 02/27/2024 09:52 AM     ______________________________________________________________________________

## 2024-02-27 NOTE — Clinical Note
The first balloon was inserted into the ramus and lateral ramus.Max pressure = 12 kel. Total duration = 40 seconds.     Max pressure = 12 kel. Total duration = 40 seconds.    Balloon reinflated a second time: Max pressure = 12 kel. Total duration = 40 seconds.

## 2024-02-27 NOTE — DISCHARGE SUMMARY
Physician Discharge Summary           [facility] Hospitalist Discharge Summary-[facility]    Name: Lokesh Shipley Jr.    MRN: [id number]     YOB: 1950    Age: 73 year old                                                     Primary care provider: Zahira Morales    Admit date:  2/27/2024    Discharge date and time: 2/27/2024    Discharge Physician: Rian Rosenbaum M.D., M.B.A.       Primary Discharge Diagnosis       Status post coronary angiogram for multivessel coronary artery disease, ramus 1 and 2 lesion  angioplasty for STEMI involving left main disease by Moe Persaud MD .  Please check Dr. Rosales consult note for details      Prox RCA lesion is 100% stenosed.   RPDA lesion is 99% stenosed.    Origin to Prox Graft lesion is 35% stenosed.    Dist LM lesion is 20% stenosed.    Prox Cx lesion is 99% stenosed.    2nd Mrg lesion is 100% stenosed.    Prox LAD lesion is 99% stenosed.    Prox Graft lesion is 80% stenosed.    Origin to Prox Graft lesion is 70% stenosed.    Ramus-1 lesion is 100% stenosed.    Ramus-2 lesion is 95% stenosed.    Ramus-1 lesion   Angioplasty   Lesion length: 15 mm. The crossed the lesion. The balloon used was a CATH BALLOON [employer] [id number]. There is no pre-interventional antegrade distal flow (CARRI 0). The post-interventional distal flow is normal (CARRI 3). Intervention was successful.   There is a 35% residual stenosis post intervention.   Ramus-2 lesion   Angioplasty   Lesion length: 20 mm. Angioplasty using a scoring balloon was performed independent of stent deployment. The balloon used was a CATH BALLOON [employer] [id number]. There is no pre-interventional antegrade distal flow (CARRI 0). The post-interventional distal flow is normal (CARRI 3). Intervention was successful.   There is a 30% residual stenosis post intervention.         Secondary Diagnosis /chronic medical conditions     Past Medical  History:   Diagnosis Date    Coronary artery disease     Heart attack (H) 2000    Hypertension     Other chronic pain     low back and radiates down both legs.  Also, numbness & tingling down both legs.    Stented coronary artery 2000    x1     Past Surgical History:  Past Surgical History:   Procedure Laterality Date    angiogram with stent placement      BACK SURGERY      low back discectomy    CARDIAC SURGERY  2009    CABG x 3    OPTICAL TRACKING SYSTEM FUSION SPINE POSTERIOR LUMBAR TWO LEVELS N/A 3/12/2020    Procedure: L3-4 and L4-5 decompressive laminectomies with medial facetectomies with decompression and exposure of the L3, L4 and L5 roots bilaterally  Placement of Nuvasive Reline traction pedicle screws bilaterally from L3-5  L3-5 posterior lateral fusion with placement of locally harvested autologous bone;  Surgeon: Jose Lundberg MD;  Location:  OR           Brief Summary of Hospital stay :       Please refer to  Admission H&P note  and subsequent progress notes in EMR for full details of patient care.    Reason for Hospitalization(C/C,HPI and brief patient summary): Chest pain      Significant findings(Primary diagnosis )Procedures and treatments provided(Hospital course ,consults, procedures):Please see below for details    Lokesh Shipley Jr. is a 73 year old  male  with past medical history is significant  for coronary artery disease status post prior bypass graft, hyperlipidemia, hypertension, MI who presents with chest pain.  Patient is noncompliant with his follow-up and medications but recently seen by his primary care physician and sought medical advice at this time.  He was found to have ST segment elevation MI and patient was taken to Cath Lab for emergency department for management.  Patient was admitted to ICU after cardiac catheterization.  Patient remained stable in the ICU and the cardiology team recommended patient to be transferred to [facility]  "for further evaluation and treatment including consideration of cardiothoracic surgery.    Addendum   - Dr Soriano from [facility] accepted patient     Consultations during hospital stay:       HOSPITALIST IP CONSULT  NUTRITION SERVICES ADULT IP CONSULT  CARDIAC REHAB IP CONSULT  PHARMACY IP CONSULT  SMOKING CESSATION PROGRAM IP CONSULT      Patient discharge Condition:     stable    /67   Pulse 58   Temp 97.7  F (36.5  C) (Oral)   Resp (!) 9   Ht 1.753 m (5' 9\")   Wt 93.4 kg (205 lb 14.6 oz)   SpO2 97%   BMI 30.41 kg/m         Discharge Instructions:       Patient/family instructions: Written discharge instruction given to patient/family    Discharge Medications:       Review of your medicines        START taking        Dose / Directions   acetaminophen 325 MG tablet  Commonly known as: TYLENOL  Used for: ST elevation myocardial infarction involving left main coronary artery (H)      Dose: 650 mg  Take 2 tablets (650 mg) by mouth every 4 hours as needed for mild pain or headaches  Refills: 0     aspirin 81 MG EC tablet  Used for: ST elevation myocardial infarction involving left main coronary artery (H)      Dose: 81 mg  Start taking on: February 28, 2024  Take 1 tablet (81 mg) by mouth daily Start tomorrow.  Quantity: 30 tablet  Refills: 3     nitroGLYcerin 0.4 MG sublingual tablet  Commonly known as: NITROSTAT  Used for: ST elevation myocardial infarction involving left main coronary artery (H)      For chest pain place 1 tablet under the tongue every 5 minutes for 3 doses. If symptoms persist 5 minutes after 1st dose call 911.  Refills: 0     ticagrelor 90 MG tablet  Commonly known as: BRILINTA  Used for: ST elevation myocardial infarction involving left main coronary artery (H)      Dose: 90 mg  Take 1 tablet (90 mg) by mouth every 12 hours  Refills: 0            CONTINUE these medicines which have NOT CHANGED        Dose / Directions   metoprolol tartrate 50 MG tablet  Commonly known as: LOPRESSOR      " Dose: 50 mg  Take 50 mg by mouth daily  Refills: 0     olmesartan 20 MG tablet  Commonly known as: BENICAR      Dose: 20 mg  Take 20 mg by mouth daily  Refills: 0               Where to get your medicines        Some of these will need a paper prescription and others can be bought over the counter. Ask your nurse if you have questions.    Bring a paper prescription for each of these medications  aspirin 81 MG EC tablet          Discharge diet:Orders Placed This Encounter      Low Saturated Fat Na <2400 mg      Diet    cardiac diet      Discharge activity:Activity as tolerated      Discharge follow-up: Hospital service at [facility] in consultation with cardiology      Major procedure performed/  Significant Diagnostic Studies:       Results for orders placed or performed during the hospital encounter of 24   XR Chest Port 1 View    Narrative    CHEST PORTABLE ONE VIEW 2024 8:22 AM     HISTORY: Chest pain.    COMPARISON: 2021.      Impression    IMPRESSION: No acute cardiopulmonary disease.    SHANTELLE RAE MD         SYSTEM ID:  [id number]   Echocardiogram Limited    Narrative    [id number]  [id number]  [id number]  [id number]^ANURAG^JEMMA^KLEBER     [facility]  Echocardiography Laboratory  [address] [place] [address]     Name: JR. KINDRA ALFORD JR.  MRN: [id number]  : 1950  Study Date: 2024 09:25 AM  Age: 73 yrs  Gender: Male  Patient Location: [facility]  Reason For Study: CAD  Ordering Physician: JEMMA OSBORN  Referring Physician: Zahira Morales PA-C  Performed By: Garland Jamison RDCS     BSA: 2.1 m2  Height: 70 in  Weight: 209 lb  ______________________________________________________________________________  Procedure  Limited Portable Echo Adult. (Emergent exam, abbreviated study performed).  ______________________________________________________________________________  Interpretation Summary     Limited echocardiogram performed  emergently in the Cath Lab.  Normal left ventricular size with moderately reduced systolic function.  Visually estimated LVEF 40-45%.  Inferolateral, distal lateral and apical hypokinesis. Limited views obtained.  Grossly normal right ventricular systolic function.  Cardiac valves not well-visualized.  No pericardial effusion.     There is no comparison study available.  ______________________________________________________________________________  ______________________________________________________________________________  Report approved by: Dr Yasmine Ferreira 02/27/2024 09:52 AM     ______________________________________________________________________________      Cardiac Catheterization    Narrative      Prox RCA lesion is 100% stenosed.    RPDA lesion is 99% stenosed.    Origin to Prox Graft lesion is 35% stenosed.    Dist LM lesion is 20% stenosed.    Prox Cx lesion is 99% stenosed.    2nd Mrg lesion is 100% stenosed.    Prox LAD lesion is 99% stenosed.    Prox Graft lesion is 80% stenosed.    Origin to Prox Graft lesion is 70% stenosed.    Ramus-1 lesion is 100% stenosed.    Ramus-2 lesion is 95% stenosed.    Highly  complex anatomy. See cardio consult regarding compliance issues   and prior CAD  The infarct vessel is the RI--this his largest vessel and had only mild   disease at time of 2009 bypass.. The mid Lcx into OM2 also had mild   disease in 2009 and is now occluded and collateralized from RCA. OM2 is   small/moderate sized vessel.    Compliance issues with medication is a serious concern. The RI vessel is   the culprit vessel but if I stented it today I would have to cross from   LMA into RA which may preclude my ability to later fix the Lcx/OM2.   Therefore today I did PTCB to the ostium of RI. Once I opened that we   found additional disease throughout the mid/distal RI including one area   of 99% disease. Both the ostium and the mid /Distal RI had PTCB with good   result. HIs chest pain  "markedly improved.    I did discuss the case/procedure approach with Dr Miranda and Dr Frank   from CV surgery  Our plan is to continue brilinta now, eventually start him on PCSK9 and   GDMT. Discuss possible hybrid redo CABG  (Lad, Diag, RI, OM2) with grafts   and stents for segments not approachable by CABG vs a highly staged   PCI/stent. That is predicated on his commitment to take antiplt meds   (currently not even taking ASA)  IF staged I would stent LCx/OM2 first then stent distal RI then proximal   RI likely stenting from LMA into RI.   Later recath via the Left radial (I could not get LIMA subselective from   leg due to tortuousity) and if needed stent ostium of LIMA then stent   ostium of VG to Diag and consider stent R-PDA via the VG.         Recent Labs   Lab 02/27/24  0819 02/27/24  0803   WBC  --  5.6   HGB 13.9 14.0   HCT 41 42.1   MCV  --  89   PLT  --  326     No results for input(s): \"CULT\" in the last 168 hours.  Recent Labs   Lab 02/27/24  1218 02/27/24  0819 02/27/24  0803   NA  --  140 139   POTASSIUM  --  4.3 4.0   CHLORIDE  --  103 103   CO2  --   --  26   ANIONGAP  --   --  10   GLC 98 115* 122*   BUN  --  17 15.0   CR  --  0.8 0.84   GFRESTIMATED  --   --  >90   BROOK  --   --  9.5       Recent Labs   Lab 02/27/24  1218 02/27/24  0819 02/27/24  0803   GLC 98 115* 122*           Pending Results:       Unresulted Labs Ordered in the Past 30 Days of this Admission       Date and Time Order Name Status Description    2/27/2024 12:28 PM Lipid Profile In process                Patient Allergies:       Allergies   Allergen Reactions    Pcn [Penicillins] Shortness Of Breath    Sulfa Antibiotics Shortness Of Breath         Disposition:     Disposition: [facility]      I saw and evaluated the patient on day of discharge and  discharge instructions reviewed  and  all the patient's questions and concerns addressed. Over 30 minutes spent on discharge and coordination of discharge process " for this patient.      Disclaimer: This note consists of symbols derived from keyboarding, dictation and/or voice recognition software. As a result, there may be errors in the script that have gone undetected. Please consider this when interpreting information found in this chart

## 2024-02-27 NOTE — Clinical Note
The first balloon was inserted into the ramus.Max pressure = 6 kel. Total duration = 12 seconds.     Max pressure = 12 kel. Total duration = 16 seconds.    Balloon reinflated a second time: Max pressure = 12 kel. Total duration = 16 seconds.  Balloon reinflated a third time: Max pressure = 6 kel. Total duration = 10 seconds.  Balloon reinflated a fourth time: Max pressure = 8 kel. Total duration = 10 seconds.

## 2024-02-27 NOTE — Clinical Note
Hemodynamic equipment used: 5 lead ECG, [employer] With 3 Leads, Machine BP Cuff and pulse oximeter probe.

## 2024-02-27 NOTE — Clinical Note
The first balloon was inserted into the ramus and ostium ramus.Max pressure = 11 kel. Total duration = 30 seconds.

## 2024-02-27 NOTE — Clinical Note
The first balloon was inserted into the circumflex.Max pressure = 8 kel. Total duration = 18 seconds.     Max pressure = 12 kel. Total duration = 18 seconds.    Balloon reinflated a second time: Max pressure = 12 kel. Total duration = 18 seconds.

## 2024-02-27 NOTE — PHARMACY-ADMISSION MEDICATION HISTORY
Pharmacist Admission Medication History    Admission medication history is complete. The information provided in this note is only as accurate as the sources available at the time of the update.    Information Source(s): Patient and CareEverywhere/SureScripts via in-person    Pertinent Information: patient is currently not taking any aspirin at home.    Changes made to PTA medication list:  Added: none  Deleted: Tylenol PRN, Robaxin, MVT, nitroglycerin, oxycodone, tadalafil  Changed: metoprolol tartrate 50 mg BID -> 50 mg daily    Allergies reviewed with patient and updates made in EHR: yes    Medication History Completed By: Fabiano Pal [facility] 2/27/2024 1:45 PM    Prior to Admission medications    Medication Sig Last Dose Taking? Auth Provider Long Term End Date   metoprolol tartrate (LOPRESSOR) 50 MG tablet Take 50 mg by mouth daily 2/26/2024 at am Yes Unknown, Entered By History No    olmesartan (BENICAR) 20 MG tablet Take 20 mg by mouth daily 2/26/2024 at pm Yes Unknown, Entered By History No

## 2024-02-27 NOTE — Clinical Note
The first balloon was inserted into the ramus and lateral ramus.Max pressure = 3 kel. Total duration = 30 seconds.     Max pressure = 4 kel. Total duration = 40 seconds.    Balloon reinflated a second time: Max pressure = 4 kel. Total duration = 40 seconds.  Balloon reinflated a third time: Max pressure = 6 kel. Total duration = 40 seconds.

## 2024-02-27 NOTE — H&P
[facility]    Hospitalist Admission Note    Name: Lokesh Shipley Jr.    MRN: [id number]  YOB: 1950    Age: 73 year old  Date of admission: 2/27/2024  Primary care provider: Zahira Morales  Admitting physician : Rian Rosenbaum M.D. ,M.B.A.       Brief summary of admission assessment/MDM:    Lokesh Shipley Jr. is a 73 year old  male  with past medical history is significant  for coronary artery disease status post prior bypass graft, hyperlipidemia, hypertension, MI who presents with chest pain.  Patient is noncompliant with his follow-up and medications but recently seen by his primary care physician and sought medical advice at this time.  He was found to have ST segment elevation MI and patient was taken to Cath Lab for emergency department for management.  Patient was admitted to ICU after cardiac catheterization and cardiology team is following patient.      Assessment and Plan       #1.  Status post coronary angiogram for multivessel coronary artery disease, ramus 1 and 2 lesion  angioplasty for STEMI involving left main disease by , Moe SHAHID MD .  Please check Dr. Rosales consult note for details  -- Patient was admitted to ICU after the procedure.  I spoke with Dr. Rosales and care plan was discussed with him.  Plan is to monitor him in ICU and transfer him to [facility] when bed is available.    #2.  Coronary artery disease status post bypass graft-triple CABG in 2009  -- Patient has been noncompliant and never followed with Dr. Walker Das since 2013.  He was not taking aspirin or statin.  Apparent statin intolerance    #3.  Hypertension-on PTA metoprolol 50 mg once a day as well as Benicar 20 mg once a day, resumed    #4.  Mixed hyperlipidemia-statin intolerance, may benefit from PCSK9    Plan  -- Admit to ICU for close monitoring and facilitate transfer to [facility] when bed is available.  Of  note transfer process initiated by me and waiting for callback if bed is available.  Will  into her discharge instructions if in case bed is available after hours.  [facility]OLANDO paperwork is already signed by me but needs accepting physician at [facility] when bed is available  -Continue aspirin, Cozaar, metoprolol and nitroglycerin drip per cardiology.    Disclaimer: This note consists of symbols derived from keyboarding, dictation and/or voice recognition software. As a result, there may be errors in the script that have gone undetected. Please consider this when interpreting information found in this chart.         Chief Complaint:     Chest pain  History is obtained from the patient, electronic health record, and emergency department physician          History of Present Illness:      This patient is a 73 year old  male with a significant past medical history of coronary artery disease who presents with the following condition requiring a hospital admission:      ST segment elevation MI  Mr. Shipley is a 73-year-old male with history of coronary artery disease with bypass graft, hypertension, hyperlipidemia who has never been compliant with his follow-up recommendations presents with chest pain over the last 2 days.  Patient started to develop shortness of breath and he was brought in by paramedics after 3 nitroglycerin tablets.  On arrival to emergency department the patient had ST segment elevation MI and Cath Lab with activated and patient had angioplasty by Dr. Hurd and patient was admitted to ICU after cardiac cath.  Patient denies any chest pain or shortness of breath up in ICU.  Denies any fever or chills.  Patient remains hemodynamically stable in the ICU.         Past Medical History:     Past Medical History:   Diagnosis Date    Coronary artery disease     Heart attack (H) 2000    Hypertension     Other chronic pain     low back and radiates down both legs.  Also, numbness & tingling down  both legs.    Stented coronary artery 2000    x1            Past Surgical History:     Past Surgical History:   Procedure Laterality Date    angiogram with stent placement      BACK SURGERY      low back discectomy    CARDIAC SURGERY  2009    CABG x 3    OPTICAL TRACKING SYSTEM FUSION SPINE POSTERIOR LUMBAR TWO LEVELS N/A 3/12/2020    Procedure: L3-4 and L4-5 decompressive laminectomies with medial facetectomies with decompression and exposure of the L3, L4 and L5 roots bilaterally  Placement of Nuvasive Reline traction pedicle screws bilaterally from L3-5  L3-5 posterior lateral fusion with placement of locally harvested autologous bone;  Surgeon: Jose Lundberg MD;  Location:  OR             Social History:     Social History     Tobacco Use    Smoking status: Never    Smokeless tobacco: Never   Substance Use Topics    Alcohol use: Yes     Comment: 0-4 weekly             Family History:     Reviewed and non contributory        Allergies:     Allergies   Allergen Reactions    Pcn [Penicillins] Shortness Of Breath    Sulfa Antibiotics Shortness Of Breath             Medications:        Prior to Admission medications    Medication Sig Last Dose Taking? Auth Provider Long Term End Date   aspirin 81 MG EC tablet Take 1 tablet (81 mg) by mouth daily Start tomorrow.  Yes Moe Rosales MD     metoprolol tartrate (LOPRESSOR) 50 MG tablet Take 50 mg by mouth daily 2/26/2024 at am Yes Unknown, Entered By History No    olmesartan (BENICAR) 20 MG tablet Take 20 mg by mouth daily 2/26/2024 at pm Yes Unknown, Entered By History No           Review of Systems:     A Comprehensive greater than 10 system review of systems was carried out.  Pertinent positives and negatives are noted above in HPI.  Otherwise negative for contributory information.           Physical Exam:     Vital signs were reviewed    Temp:  [97.4  F (36.3  C)-97.7  F (36.5  C)] 97.7  F (36.5  C)  Pulse:  [72-82] 78  Resp:  [4-24] 13  BP:  (102-155)/(62-83) 125/77  SpO2:  [92 %-99 %] 94 %        GEN: awake, alert, cooperative, no apparent distress, oriented x 3    NECK:Supple ,no mass or thyromegaly     HEENT:  Normocephalic/atraumatic, no scleral icterus, no nasal discharge, mouth moist.    CV:  Regular rate and rhythm, no murmur or JVD.  S1 + S2 noted, no S3 or S4.    LUNGS:  Clear to auscultation bilaterally without rales/rhonchi/wheezing/retractions.  Symmetric chest rise on inhalation noted.    ABD:  Active bowel sounds, soft, non-tender/non-distended.  No rebound/guarding/rigidity.    EXT:  No edema.  No cyanosis.  No joint synovitis noted.Lower extremity pulses are normal bilaterally and     LGS: No cervical or axillary lymphadenopathy     SKIN:  Dry to touch, warm ,no exanthems noted in the visualized areas.    Neurologic:Grossly intact,non focal . No acute focal neurologic deficit     Psychaitric exam: Mood and affect normal     Additional significant  Findings:             Data:       All laboratory and imaging data in the past 24 hours reviewed     Results for orders placed or performed during the hospital encounter of 02/27/24   XR Chest Port 1 View     Status: None    Narrative    CHEST PORTABLE ONE VIEW February 27, 2024 8:22 AM     HISTORY: Chest pain.    COMPARISON: 9/20/2021.      Impression    IMPRESSION: No acute cardiopulmonary disease.    SHANTELLE RAE MD         SYSTEM ID:  [id number]   [place] Draw     Status: None    Narrative    The following orders were created for panel order [place] Draw.  Procedure                               Abnormality         Status                     ---------                               -----------         ------                     Extra Blue Top Tube[[id number]]                              Final result               Extra Red Top Tube[[id number]]                               Final result               Extra Green Top (Lithium...[[id number]]                      Final result               Extra  Purple Top Tube[[id number]]                            Final result               Extra Blood Bank Purple ...[[id number]]                      Final result                 Please view results for these tests on the individual orders.   Extra Blue Top Tube     Status: None   Result Value Ref Range    Hold Specimen JIC    Extra Red Top Tube     Status: None   Result Value Ref Range    Hold Specimen JIC    Extra Green Top (Lithium Heparin) Tube     Status: None   Result Value Ref Range    Hold Specimen JIC    Extra Purple Top Tube     Status: None   Result Value Ref Range    Hold Specimen JIC    Extra Blood Bank Purple Top Tube     Status: None   Result Value Ref Range    Hold Specimen JIC    Basic metabolic panel     Status: Abnormal   Result Value Ref Range    Sodium 139 135 - 145 mmol/L    Potassium 4.0 3.4 - 5.3 mmol/L    Chloride 103 98 - 107 mmol/L    Carbon Dioxide (CO2) 26 22 - 29 mmol/L    Anion Gap 10 7 - 15 mmol/L    Urea Nitrogen 15.0 8.0 - 23.0 mg/dL    Creatinine 0.84 0.67 - 1.17 mg/dL    GFR Estimate >90 >60 mL/min/1.73m2    Calcium 9.5 8.8 - 10.2 mg/dL    Glucose 122 (H) 70 - 99 mg/dL   Troponin T, High Sensitivity     Status: Abnormal   Result Value Ref Range    Troponin T, High Sensitivity 31 (H) <=22 ng/L   CBC with platelets and differential     Status: None   Result Value Ref Range    WBC Count 5.6 4.0 - 11.0 10e3/uL    RBC Count 4.71 4.40 - 5.90 10e6/uL    Hemoglobin 14.0 13.3 - 17.7 g/dL    Hematocrit 42.1 40.0 - 53.0 %    MCV 89 78 - 100 fL    MCH 29.7 26.5 - 33.0 pg    MCHC 33.3 31.5 - 36.5 g/dL    RDW 11.9 10.0 - 15.0 %    Platelet Count 326 150 - 450 10e3/uL    % Neutrophils 54 %    % Lymphocytes 28 %    % Monocytes 13 %    % Eosinophils 3 %    % Basophils 1 %    % Immature Granulocytes 1 %    NRBCs per 100 WBC 0 <1 /100    Absolute Neutrophils 3.1 1.6 - 8.3 10e3/uL    Absolute Lymphocytes 1.6 0.8 - 5.3 10e3/uL    Absolute Monocytes 0.7 0.0 - 1.3 10e3/uL    Absolute Eosinophils 0.2 0.0 - 0.7  10e3/uL    Absolute Basophils 0.0 0.0 - 0.2 10e3/uL    Absolute Immature Granulocytes 0.0 <=0.4 10e3/uL    Absolute NRBCs 0.0 10e3/uL   iStat Basic Chem ICA Hematocrit, POCT     Status: Abnormal   Result Value Ref Range    Chloride POCT 103 94 - 109 mmol/L    Potassium POCT 4.3 3.4 - 5.3 mmol/L    Sodium POCT 140 135 - 145 mmol/L    UREA NITROGEN POCT 17 7 - 30 mg/dL    Calcium, Ionized Whole Blood POCT 4.7 4.4 - 5.2 mg/dL    Glucose Whole Blood POCT 115 (H) 70 - 99 mg/dL    Anion Gap POCT 15.0 (H) 3.0 - 14.0 mmol/L    Hemoglobin POCT 13.9 13.3 - 17.7 g/dL    Hematocrit POCT 41 40 - 53 %    Creatinine POCT 0.8 0.7 - 1.3 mg/dL    TOTAL CO2 POCT 27 20 - 32 mmol/L   Activated clotting time celite, POCT     Status: Abnormal   Result Value Ref Range    Activated Clotting Time (Celite) POCT 205 (H) 74 - 150 seconds   Activated clotting time celite, POCT     Status: Abnormal   Result Value Ref Range    Activated Clotting Time (Celite) POCT 282 (H) 74 - 150 seconds   Activated clotting time celite, POCT     Status: Abnormal   Result Value Ref Range    Activated Clotting Time (Celite) POCT 302 (H) 74 - 150 seconds   Glucose by meter     Status: Normal   Result Value Ref Range    GLUCOSE BY METER POCT 98 70 - 99 mg/dL   Troponin T, High Sensitivity     Status: Abnormal   Result Value Ref Range    Troponin T, High Sensitivity 452 (HH) <=22 ng/L   Troponin T, High Sensitivity     Status: Abnormal   Result Value Ref Range    Troponin T, High Sensitivity 527 (HH) <=22 ng/L   EKG 12 lead     Status: None   Result Value Ref Range    Systolic Blood Pressure  mmHg    Diastolic Blood Pressure  mmHg    Ventricular Rate 77 BPM    Atrial Rate 77 BPM    HI Interval 226 ms    QRS Duration 112 ms     ms    QTc 448 ms    P Axis 60 degrees    R AXIS 7 degrees    T Axis 94 degrees    Interpretation ECG       Sinus rhythm with 1st degree A-V block  Possible Inferior infarct (cited on or before 20-FEB-2009)  Marked ST abnormality, possible  lateral subendocardial injury  Abnormal ECG  When compared with ECG of 12-MAR-2020 14:54,  ST more depressed Lateral leads  Nonspecific T wave abnormality no longer evident in Lateral leads  Unconfirmed report - interpretation of this ECG is computer generated - see medical record for final interpretation  Confirmed by - EMERGENCY ROOM, PHYSICIAN ([id number]),  JEREMY VILLEDA ([id number]) on 2024 8:20:41 AM     EKG 12 lead     Status: None   Result Value Ref Range    Systolic Blood Pressure  mmHg    Diastolic Blood Pressure  mmHg    Ventricular Rate 74 BPM    Atrial Rate 74 BPM    ID Interval 212 ms    QRS Duration 110 ms     ms    QTc 441 ms    P Axis 69 degrees    R AXIS 2 degrees    T Axis 98 degrees    Interpretation ECG       Sinus rhythm with 1st degree A-V block with occasional Premature ventricular complexes  Possible Inferior infarct (cited on or before 2009)  Cannot rule out Anterior infarct , age undetermined  Marked ST abnormality, possible lateral subendocardial injury  Abnormal ECG  When compared with ECG of 2024 08:02, (unconfirmed)  Premature ventricular complexes are now Present  Minimal criteria for Anterior infarct are now Present  Unconfirmed report - interpretation of this ECG is computer generated - see medical record for final interpretation  Confirmed by - EMERGENCY ROOM, PHYSICIAN ([id number]),  JEREMY VILLEDA ([id number]) on 2024 8:20:35 AM     Echocardiogram Limited     Status: None    Narrative    [id number]  [id number]  [id number]  [id number]^ANURAG^JEMMA^KLEBER     [facility]  Echocardiography Laboratory  [address] [place] [address]     Name: JR. KINDRA ALFORD JR.  MRN: [id number]  : 1950  Study Date: 2024 09:25 AM  Age: 73 yrs  Gender: Male  Patient Location: [facility]  Reason For Study: CAD  Ordering Physician: JEMMA OSBORN  Referring Physician: Zahira Morales PA-C  Performed By: Garland Jamison RDCS     BSA: 2.1  m2  Height: 70 in  Weight: 209 lb  ______________________________________________________________________________  Procedure  Limited Portable Echo Adult. (Emergent exam, abbreviated study performed).  ______________________________________________________________________________  Interpretation Summary     Limited echocardiogram performed emergently in the Cath Lab.  Normal left ventricular size with moderately reduced systolic function.  Visually estimated LVEF 40-45%.  Inferolateral, distal lateral and apical hypokinesis. Limited views obtained.  Grossly normal right ventricular systolic function.  Cardiac valves not well-visualized.  No pericardial effusion.     There is no comparison study available.  ______________________________________________________________________________  ______________________________________________________________________________  Report approved by: Dr Yasmine Ferreira 02/27/2024 09:52 AM     ______________________________________________________________________________      Cardiac Catheterization     Status: None    Narrative      Prox RCA lesion is 100% stenosed.    RPDA lesion is 99% stenosed.    Origin to Prox Graft lesion is 35% stenosed.    Dist LM lesion is 20% stenosed.    Prox Cx lesion is 99% stenosed.    2nd Mrg lesion is 100% stenosed.    Prox LAD lesion is 99% stenosed.    Prox Graft lesion is 80% stenosed.    Origin to Prox Graft lesion is 70% stenosed.    Ramus-1 lesion is 100% stenosed.    Ramus-2 lesion is 95% stenosed.    Highly  complex anatomy. See cardio consult regarding compliance issues   and prior CAD  The infarct vessel is the RI--this his largest vessel and had only mild   disease at time of 2009 bypass.. The mid Lcx into OM2 also had mild   disease in 2009 and is now occluded and collateralized from RCA. OM2 is   small/moderate sized vessel.    Compliance issues with medication is a serious concern. The RI vessel is   the culprit vessel but if I stented  it today I would have to cross from   LMA into RA which may preclude my ability to later fix the Lcx/OM2.   Therefore today I did PTCB to the ostium of RI. Once I opened that we   found additional disease throughout the mid/distal RI including one area   of 99% disease. Both the ostium and the mid /Distal RI had PTCB with good   result. HIs chest pain markedly improved.    I did discuss the case/procedure approach with Dr Miranda and Dr Frank   from CV surgery  Our plan is to continue brilinta now, eventually start him on PCSK9 and   GDMT. Discuss possible hybrid redo CABG  (Lad, Diag, RI, OM2) with grafts   and stents for segments not approachable by CABG vs a highly staged   PCI/stent. That is predicated on his commitment to take antiplt meds   (currently not even taking ASA)  IF staged I would stent LCx/OM2 first then stent distal RI then proximal   RI likely stenting from LMA into RI.   Later recath via the Left radial (I could not get LIMA subselective from   leg due to tortuousity) and if needed stent ostium of LIMA then stent   ostium of VG to Diag and consider stent R-PDA via the VG.     CBC with platelets differential     Status: None    Narrative    The following orders were created for panel order CBC with platelets differential.  Procedure                               Abnormality         Status                     ---------                               -----------         ------                     CBC with platelets and d...[[id number]]                      Final result                 Please view results for these tests on the individual orders.          Recent Results (from the past 48 hour(s))   XR Chest Port 1 View    Narrative    CHEST PORTABLE ONE VIEW February 27, 2024 8:22 AM     HISTORY: Chest pain.    COMPARISON: 9/20/2021.      Impression    IMPRESSION: No acute cardiopulmonary disease.   Echocardiogram Limited    Narrative    [id number]  [id number]  [id number]  [id number]^ANURAG^JEMMA^KLEBER      [facility]  Echocardiography Laboratory  [address] [place] [address]     Name: JR. KINDRA ALFORD JR.  MRN: [id number]  : 1950  Study Date: 2024 09:25 AM  Age: 73 yrs  Gender: Male  Patient Location: [facility]  Reason For Study: CAD  Ordering Physician: JEMMA OSBORN  Referring Physician: Zahira Morales PA-C  Performed By: Garland Jamison RDCS     BSA: 2.1 m2  Height: 70 in  Weight: 209 lb  ______________________________________________________________________________  Procedure  Limited Portable Echo Adult. (Emergent exam, abbreviated study performed).  ______________________________________________________________________________  Interpretation Summary     Limited echocardiogram performed emergently in the Cath Lab.  Normal left ventricular size with moderately reduced systolic function.  Visually estimated LVEF 40-45%.  Inferolateral, distal lateral and apical hypokinesis. Limited views obtained.  Grossly normal right ventricular systolic function.  Cardiac valves not well-visualized.  No pericardial effusion.     There is no comparison study available.  ______________________________________________________________________________  ______________________________________________________________________________  Report approved by: Dr Yasmine Ferreira 2024 09:52 AM     ______________________________________________________________________________      Cardiac Catheterization    Narrative      Prox RCA lesion is 100% stenosed.    RPDA lesion is 99% stenosed.    Origin to Prox Graft lesion is 35% stenosed.    Dist LM lesion is 20% stenosed.    Prox Cx lesion is 99% stenosed.    2nd Mrg lesion is 100% stenosed.    Prox LAD lesion is 99% stenosed.    Prox Graft lesion is 80% stenosed.    Origin to Prox Graft lesion is 70% stenosed.    Ramus-1 lesion is 100% stenosed.    Ramus-2 lesion is 95% stenosed.    Highly  complex anatomy. See cardio consult regarding  compliance issues   and prior CAD  The infarct vessel is the RI--this his largest vessel and had only mild   disease at time of 2009 bypass.. The mid Lcx into OM2 also had mild   disease in 2009 and is now occluded and collateralized from RCA. OM2 is   small/moderate sized vessel.    Compliance issues with medication is a serious concern. The RI vessel is   the culprit vessel but if I stented it today I would have to cross from   LMA into RA which may preclude my ability to later fix the Lcx/OM2.   Therefore today I did PTCB to the ostium of RI. Once I opened that we   found additional disease throughout the mid/distal RI including one area   of 99% disease. Both the ostium and the mid /Distal RI had PTCB with good   result. HIs chest pain markedly improved.    I did discuss the case/procedure approach with Dr Miranda and Dr Frank   from CV surgery  Our plan is to continue brilinta now, eventually start him on PCSK9 and   GDMT. Discuss possible hybrid redo CABG  (Lad, Diag, RI, OM2) with grafts   and stents for segments not approachable by CABG vs a highly staged   PCI/stent. That is predicated on his commitment to take antiplt meds   (currently not even taking ASA)  IF staged I would stent LCx/OM2 first then stent distal RI then proximal   RI likely stenting from LMA into RI.   Later recath via the Left radial (I could not get LIMA subselective from   leg due to tortuousity) and if needed stent ostium of LIMA then stent   ostium of VG to Diag and consider stent R-PDA via the VG.         EKG results: Initial EKG showed sinus rhythm with first-degree AV block with posterior inferior infarct, marked ST segment abnormality is possible lateral subendocardial injury         All imaging studies reviewed by me.         Patient`s old medical records reviewed and case discussed with cardiologist.

## 2024-02-27 NOTE — Clinical Note
The first balloon was inserted into the ramus and ramus.Max pressure = 9 kel. Total duration = 40 seconds.     Max pressure = 9 kel. Total duration = 40 seconds.    Balloon reinflated a second time: Max pressure = 9 kel. Total duration = 40 seconds.

## 2024-02-27 NOTE — Clinical Note
The first balloon was inserted into the ramus and ramus.Max pressure = 6 kel. Total duration = 40 seconds.

## 2024-02-27 NOTE — Clinical Note
The first balloon was inserted into the saphenous vein graft.Max pressure = 8 kel. Total duration = 12 seconds.     Max pressure = 6 kel. Total duration = 12 seconds.    Balloon reinflated a second time: Max pressure = 6 kel. Total duration = 12 seconds.

## 2024-02-27 NOTE — Clinical Note
The first balloon was inserted into the ramus and lateral ramus.Max pressure = 16 kel. Total duration = 40 seconds.

## 2024-02-28 ENCOUNTER — APPOINTMENT (OUTPATIENT)
Dept: ULTRASOUND IMAGING | Facility: CLINIC | Age: 74
DRG: 322 | End: 2024-02-28
Attending: SURGERY
Payer: COMMERCIAL

## 2024-02-28 LAB
ALBUMIN SERPL BCG-MCNC: 3.7 G/DL (ref 3.5–5.2)
ALBUMIN UR-MCNC: NEGATIVE MG/DL
ALP SERPL-CCNC: 58 U/L (ref 40–150)
ALT SERPL W P-5'-P-CCNC: 29 U/L (ref 0–70)
ANION GAP SERPL CALCULATED.3IONS-SCNC: 10 MMOL/L (ref 7–15)
APPEARANCE UR: CLEAR
AST SERPL W P-5'-P-CCNC: 112 U/L (ref 0–45)
BILIRUB DIRECT SERPL-MCNC: <0.2 MG/DL (ref 0–0.3)
BILIRUB SERPL-MCNC: 0.8 MG/DL
BILIRUB UR QL STRIP: NEGATIVE
BUN SERPL-MCNC: 10.5 MG/DL (ref 8–23)
CALCIUM SERPL-MCNC: 8.7 MG/DL (ref 8.8–10.2)
CHLORIDE SERPL-SCNC: 103 MMOL/L (ref 98–107)
COLOR UR AUTO: NORMAL
CREAT SERPL-MCNC: 0.74 MG/DL (ref 0.67–1.17)
DEPRECATED HCO3 PLAS-SCNC: 24 MMOL/L (ref 22–29)
EGFRCR SERPLBLD CKD-EPI 2021: >90 ML/MIN/1.73M2
ERYTHROCYTE [DISTWIDTH] IN BLOOD BY AUTOMATED COUNT: 12 % (ref 10–15)
GLUCOSE SERPL-MCNC: 113 MG/DL (ref 70–99)
GLUCOSE UR STRIP-MCNC: NEGATIVE MG/DL
HBA1C MFR BLD: 6.1 %
HCT VFR BLD AUTO: 33.9 % (ref 40–53)
HGB BLD-MCNC: 11.6 G/DL (ref 13.3–17.7)
HGB UR QL STRIP: NEGATIVE
KETONES UR STRIP-MCNC: NEGATIVE MG/DL
LEUKOCYTE ESTERASE UR QL STRIP: NEGATIVE
MCH RBC QN AUTO: 29.7 PG (ref 26.5–33)
MCHC RBC AUTO-ENTMCNC: 34.2 G/DL (ref 31.5–36.5)
MCV RBC AUTO: 87 FL (ref 78–100)
NITRATE UR QL: NEGATIVE
PH UR STRIP: 6.5 [PH] (ref 5–7)
PLATELET # BLD AUTO: 272 10E3/UL (ref 150–450)
POTASSIUM SERPL-SCNC: 4.1 MMOL/L (ref 3.4–5.3)
PROT SERPL-MCNC: 6.1 G/DL (ref 6.4–8.3)
RBC # BLD AUTO: 3.91 10E6/UL (ref 4.4–5.9)
SODIUM SERPL-SCNC: 137 MMOL/L (ref 135–145)
SP GR UR STRIP: 1.01 (ref 1–1.03)
TROPONIN T SERPL HS-MCNC: 1258 NG/L
TROPONIN T SERPL HS-MCNC: 1328 NG/L
UFH PPP CHRO-ACNC: 0.16 IU/ML
UFH PPP CHRO-ACNC: 0.3 IU/ML
UROBILINOGEN UR STRIP-MCNC: NORMAL MG/DL
WBC # BLD AUTO: 8.5 10E3/UL (ref 4–11)

## 2024-02-28 PROCEDURE — 84450 TRANSFERASE (AST) (SGOT): CPT | Performed by: STUDENT IN AN ORGANIZED HEALTH CARE EDUCATION/TRAINING PROGRAM

## 2024-02-28 PROCEDURE — 210N000001 HC R&B IMCU HEART CARE

## 2024-02-28 PROCEDURE — 93005 ELECTROCARDIOGRAM TRACING: CPT

## 2024-02-28 PROCEDURE — 250N000013 HC RX MED GY IP 250 OP 250 PS 637: Performed by: NURSE PRACTITIONER

## 2024-02-28 PROCEDURE — 85520 HEPARIN ASSAY: CPT | Performed by: INTERNAL MEDICINE

## 2024-02-28 PROCEDURE — 36415 COLL VENOUS BLD VENIPUNCTURE: CPT | Performed by: INTERNAL MEDICINE

## 2024-02-28 PROCEDURE — 99222 1ST HOSP IP/OBS MODERATE 55: CPT | Mod: FS | Performed by: PHYSICIAN ASSISTANT

## 2024-02-28 PROCEDURE — 84155 ASSAY OF PROTEIN SERUM: CPT | Performed by: STUDENT IN AN ORGANIZED HEALTH CARE EDUCATION/TRAINING PROGRAM

## 2024-02-28 PROCEDURE — 85027 COMPLETE CBC AUTOMATED: CPT | Performed by: INTERNAL MEDICINE

## 2024-02-28 PROCEDURE — 250N000011 HC RX IP 250 OP 636: Performed by: INTERNAL MEDICINE

## 2024-02-28 PROCEDURE — 80048 BASIC METABOLIC PNL TOTAL CA: CPT | Performed by: INTERNAL MEDICINE

## 2024-02-28 PROCEDURE — 83036 HEMOGLOBIN GLYCOSYLATED A1C: CPT | Performed by: INTERNAL MEDICINE

## 2024-02-28 PROCEDURE — 99233 SBSQ HOSP IP/OBS HIGH 50: CPT | Mod: FS | Performed by: NURSE PRACTITIONER

## 2024-02-28 PROCEDURE — 250N000013 HC RX MED GY IP 250 OP 250 PS 637: Performed by: INTERNAL MEDICINE

## 2024-02-28 PROCEDURE — 99207 PR NO BILLABLE SERVICE THIS VISIT: CPT | Performed by: NURSE PRACTITIONER

## 2024-02-28 PROCEDURE — 93970 EXTREMITY STUDY: CPT

## 2024-02-28 PROCEDURE — 81003 URINALYSIS AUTO W/O SCOPE: CPT | Performed by: SURGERY

## 2024-02-28 PROCEDURE — 84484 ASSAY OF TROPONIN QUANT: CPT | Performed by: INTERNAL MEDICINE

## 2024-02-28 PROCEDURE — 99233 SBSQ HOSP IP/OBS HIGH 50: CPT | Performed by: INTERNAL MEDICINE

## 2024-02-28 PROCEDURE — 93880 EXTRACRANIAL BILAT STUDY: CPT

## 2024-02-28 RX ORDER — HEPARIN SODIUM 10000 [USP'U]/100ML
0-5000 INJECTION, SOLUTION INTRAVENOUS CONTINUOUS
Status: DISCONTINUED | OUTPATIENT
Start: 2024-02-28 | End: 2024-03-01

## 2024-02-28 RX ORDER — METOPROLOL SUCCINATE 25 MG/1
25 TABLET, EXTENDED RELEASE ORAL DAILY
Status: DISCONTINUED | OUTPATIENT
Start: 2024-02-29 | End: 2024-03-02 | Stop reason: HOSPADM

## 2024-02-28 RX ADMIN — ACETAMINOPHEN 650 MG: 325 TABLET, FILM COATED ORAL at 00:06

## 2024-02-28 RX ADMIN — LOSARTAN POTASSIUM 50 MG: 50 TABLET, FILM COATED ORAL at 09:21

## 2024-02-28 RX ADMIN — HEPARIN SODIUM 1300 UNITS/HR: 10000 INJECTION, SOLUTION INTRAVENOUS at 22:30

## 2024-02-28 RX ADMIN — METOPROLOL TARTRATE 50 MG: 50 TABLET, FILM COATED ORAL at 09:21

## 2024-02-28 RX ADMIN — NITROGLYCERIN 100 MCG/MIN: 20 INJECTION INTRAVENOUS at 06:44

## 2024-02-28 RX ADMIN — ASPIRIN 81 MG CHEWABLE TABLET 81 MG: 81 TABLET CHEWABLE at 09:20

## 2024-02-28 RX ADMIN — HEPARIN SODIUM 1150 UNITS/HR: 10000 INJECTION, SOLUTION INTRAVENOUS at 05:15

## 2024-02-28 RX ADMIN — NITROGLYCERIN 90 MCG/MIN: 20 INJECTION INTRAVENOUS at 15:19

## 2024-02-28 RX ADMIN — ACETAMINOPHEN 650 MG: 325 TABLET, FILM COATED ORAL at 04:24

## 2024-02-28 RX ADMIN — METOPROLOL TARTRATE 50 MG: 50 TABLET, FILM COATED ORAL at 21:10

## 2024-02-28 ASSESSMENT — ACTIVITIES OF DAILY LIVING (ADL)
ADLS_ACUITY_SCORE: 27
ADLS_ACUITY_SCORE: 23
ADLS_ACUITY_SCORE: 23
ADLS_ACUITY_SCORE: 27
ADLS_ACUITY_SCORE: 27
ADLS_ACUITY_SCORE: 23
ADLS_ACUITY_SCORE: 27
ADLS_ACUITY_SCORE: 23
ADLS_ACUITY_SCORE: 27
ADLS_ACUITY_SCORE: 27
ADLS_ACUITY_SCORE: 23
ADLS_ACUITY_SCORE: 23
ADLS_ACUITY_SCORE: 27
ADLS_ACUITY_SCORE: 27
ADLS_ACUITY_SCORE: 23
ADLS_ACUITY_SCORE: 27
ADLS_ACUITY_SCORE: 27
ADLS_ACUITY_SCORE: 23

## 2024-02-28 NOTE — CONSULTS
"SPIRITUAL HEALTH SERVICES - Consult Note  [facility]  Referral Source/Reason for Visit: Emotional and spiritual support    Summary and Recommendations -  Sandor shared that their \"family has been through hard things before and we know that God is with us.\"   Sandor named the importance of prayer and his trust that \"God hears our prayers.\" Prayer was welcomed and provided.    Plan: Will follow up in 2-4 days per family request for support. Please consult Spiritual Health if more emergent needs arise.    Carolyn Gtz M.Div.  Staff     SHS available 24/7 for emergent requests/referrals, either by paging the on-call  or by entering an ASAP/STAT consult in [employer], which will also page the on-call .    Assessment    Saw pt Lokesh \"Sandor\" Alice Shipley Jr. and Maria De Jesus per consult for emotional support.    Patient/Family Understanding of Illness and Goals of Care - Sandor is awaiting testing to determine the treatment plan, but expects to be in the hospital \"for a number of days.\"    Distress and Loss -   Alice, was tearful as she shared that Sandor had had multiple stents and bypass surgery in the past.   Sandor expressed some worry about potentially needing to go through heart surgery again and the lengthy recovery it will require.    Strengths, Coping, and Resources - Sandor shared that they have wonderful support from their family (3 sons, daughter, and 10 grandchildren), many of whom has already been to the hospital to visit.    Meaning, Beliefs, and Spirituality   Sandor shared that their \"family has been through hard things before and we know that God is with us.\"   Sandor named the importance of prayer and his trust that \"God hears our prayers.\" Prayer was welcomed and provided.      " Products Recommended: La Roche-Posay and Elta MD Detail Level: Zone General Sunscreen Counseling: I recommended a broad spectrum sunscreen with a SPF of 30 or higher.  I explained that SPF 30 sunscreens block approximately 97 percent of the sun's harmful rays.  Sunscreens should be applied at least 15 minutes prior to expected sun exposure and then every 2 hours after that as long as sun exposure continues. If swimming or exercising sunscreen should be reapplied every 45 minutes to an hour after getting wet or sweating.  One ounce, or the equivalent of a shot glass full of sunscreen, is adequate to protect the skin not covered by a bathing suit. I also recommended a lip balm with a sunscreen as well. Sun protective clothing can be used in lieu of sunscreen but must be worn the entire time you are exposed to the sun's rays.

## 2024-02-28 NOTE — PLAN OF CARE
Tx from [facility]. A&Ox4. 2L NC for comfort - symptomatic CP w SOB and TERESA. Midsternal pressure CP 0-1/10, nitroglycerin gtt 100mcg. Persistent headache - tylenol given twice and dilaudid/morphine regimen declined. SBP 90-100s. Hep gtt @ 1,150 units, next Xa @ 1040. NPO since ~0430. R groin site (angio 2/27) - CDI and CMS intact. Patient expresses concern of possible constipation, last BM 2/26. Miralax given.  *RRT called ~ 1215 for increased CP and trop 528 to 1338. Redraw trop 1258.  Plan: CVOR c/s, cards c/s

## 2024-02-28 NOTE — H&P
[facility]    History and Physical - Hospitalist Service       Date of Admission:  2/27/2024    Assessment & Plan   Lokesh Shipley Jr. is a 73 year old male with past medical history including coronary artery disease with history of MI and prior CABG, hypertension, hyperlipidemia who presents to [facility] with chest pain, found to have NSTEMI and complex anatomy, transferred to [facility] for CV surgery and cardiology consultation for intervention. Admitted on 2/27/2024.     STEMI status post PTCB 2/27/2024   Coronary artery disease with prior MI and CABG  Ischemic cardiomyopathy   Hypertension  Hyperlipidemia  Medication non-adherence   *Presented with severe chest pain  *EKG with STEMI in aVR with diffuse ST depressions. Troponin 31->452->527.  *Echocardiogram with EF 40-45%, inferolateral, distal lateral and apical hypokinesis  - Cardiology and CV surgery consulted  - Aspirin, ticagrelor, metoprolol, losartan ordered  - NTG gtt   - Telemetry  - Lipid panel from [facility] pending  - HgbA1c in AM   - Statin intolerant per notes, plan for eventual PSK9 inhibitor per cardiology   - Discussed with patient critical importance of medication adherence moving forward     ADDENDUM: Overnight with intermittent increases in chest pressure improved with nitroglycerin titration, serial EKG x2 stable with T wave inversions in inferolateral leads. Troponin initially rising, with ongoing intermittent pain that had initially improved post-intervention, started on heparin gtt without bolus. Repeat troponin now trending down. Patient repeatedly declined trial of morphine IV to potentially reduce NTG gtt as experienced some headache with higher doses.        Diet: Combination Diet Low Saturated Fat Na <2400mg Diet, No Caffeine Diet  DVT Prophylaxis: Pneumatic Compression Devices  Miller Catheter: Not present  Code Status: Full Code    Disposition Plan   Admit to inpatient. Anticipate greater than  "or equal to 2 midnights prior to discharge.      Entered: Oscar Rosa MD 2024, 10:33 PM     The patient's care was discussed with the ED provider, patient        Clinically Significant Risk Factors Present on Admission                # Drug Induced Platelet Defect: home medication list includes an antiplatelet medication   # Hypertension: Home medication list includes antihypertensive(s)      # Obesity: Estimated body mass index is 30.57 kg/m  as calculated from the following:    Height as of an earlier encounter on 24: 1.753 m (5' 9\").    Weight as of this encounter: 93.9 kg (207 lb).                   Oscar Rosa MD  [facility]    ______________________________________________________________________    Chief Complaint   Chest pain    History of Present Illness   Lokesh Shipley Jr. is a 73 year old male who presents with the above chief complaint.    History is obtained from the patient and review of medical record. The patient reports for the last few weeks he has had episodes of shortness of breath that would resolve after a few minutes. On the morning of presentation, he developed acute onset of shortness of breath and central chest pressure that rapidly accelerated and felt like his prior heart attack. He attempted to take  nitroglycerin without relief, EMS was called and administered additional 3 nitroglycerin.  He was taken to [facility] where he was found to have ST elevation in aVR, taken emergently for angiogram revealing culprit occlusion in the R1 vessel treated with PTCB.  He was transferred to [facility] for consultation with CV surgery and cardiology for definitive interventions.  At present, he denies any shortness of breath.  He reports 1-2 out of 10 substernal chest pressure.  He reports he was compliant with his metoprolol and olmesartan at home, was not taking aspirin.    Past Medical History    I have reviewed this patient's medical " history and updated it with pertinent information if needed.   Past Medical History:   Diagnosis Date    Coronary artery disease     Heart attack (H) 2000    Hypertension     Other chronic pain     low back and radiates down both legs.  Also, numbness & tingling down both legs.    Stented coronary artery 2000    x1       Past Surgical History   I have reviewed this patient's surgical history and updated it with pertinent information if needed.  Past Surgical History:   Procedure Laterality Date    angiogram with stent placement      BACK SURGERY      low back discectomy    CARDIAC SURGERY  2009    CABG x 3    CV CORONARY ANGIOGRAM N/A 2/27/2024    Procedure: Coronary Angiogram;  Surgeon: Moe Rosales MD;  Location: [facility] CARDIAC CATH LAB    CV INTRAVASULAR ULTRASOUND N/A 2/27/2024    Procedure: Intravascular Ultrasound;  Surgeon: Moe Rosales MD;  Location: [facility] CARDIAC CATH LAB    CV PCI N/A 2/27/2024    Procedure: Percutaneous Coronary Intervention;  Surgeon: Moe Rosales MD;  Location: [facility] CARDIAC CATH LAB    OPTICAL TRACKING SYSTEM FUSION SPINE POSTERIOR LUMBAR TWO LEVELS N/A 3/12/2020    Procedure: L3-4 and L4-5 decompressive laminectomies with medial facetectomies with decompression and exposure of the L3, L4 and L5 roots bilaterally  Placement of Nuvasive Reline traction pedicle screws bilaterally from L3-5  L3-5 posterior lateral fusion with placement of locally harvested autologous bone;  Surgeon: Jose Lundberg MD;  Location:  OR         Social History   I have reviewed this patient's social history and updated it with pertinent information if needed.  Social History     Tobacco Use    Smoking status: Never    Smokeless tobacco: Never   Substance Use Topics    Alcohol use: Yes     Comment: 0-4 weekly    Drug use: Never          Prior to Admission Medications    Prior to Admission Medications   Prescriptions Last Dose Informant Patient Reported? Taking?    acetaminophen (TYLENOL) 325 MG tablet   No No   Sig: Take 2 tablets (650 mg) by mouth every 4 hours as needed for mild pain or headaches   aspirin 81 MG EC tablet   No No   Sig: Take 1 tablet (81 mg) by mouth daily Start tomorrow.   metoprolol tartrate (LOPRESSOR) 50 MG tablet 2/26/2024 at am  Yes Yes   Sig: Take 50 mg by mouth daily   nitroGLYcerin (NITROSTAT) 0.4 MG sublingual tablet   No No   Sig: For chest pain place 1 tablet under the tongue every 5 minutes for 3 doses. If symptoms persist 5 minutes after 1st dose call 911.   olmesartan (BENICAR) 20 MG tablet 2/26/2024 at pm  Yes Yes   Sig: Take 20 mg by mouth daily   ticagrelor (BRILINTA) 90 MG tablet   No No   Sig: Take 1 tablet (90 mg) by mouth every 12 hours      Facility-Administered Medications: None     Allergies   Allergies   Allergen Reactions    Pcn [Penicillins] Shortness Of Breath    Sulfa Antibiotics Shortness Of Breath       Physical Exam   Vital Signs: Temp: 98.2  F (36.8  C) Temp src: Oral BP: 117/61 Pulse: 61     SpO2: 96 % O2 Device: None (Room air)    Weight: 207 lbs 0 oz    Constitutional: Well-appearing, NAD  Respiratory: Clear to auscultation bilaterally, good air movement, normal effort   Cardiovascular: RRR. No peripheral edema.    GI: Soft, non-tender, non-distended. No rebound tenderness or guarding.    Skin: Warm, dry   Musculoskeletal: R groin site unremarkable   Neurologic: Alert. Responding to questions appropriately. Following commands.    Psychiatric: Normal affect, appropriate      Data   Data reviewed today: I reviewed all medications, new labs and imaging results over the last 24 hours. I personally reviewed the EKG tracing showing ST elevation in aVR with diffuse ST depressions .    Recent Labs   Lab 02/27/24  2019 02/27/24  1601 02/27/24  1218 02/27/24  0819 02/27/24  0803   WBC  --   --   --   --  5.6   HGB  --   --   --  13.9 14.0   MCV  --   --   --   --  89   PLT  --   --   --   --  326   NA  --   --   --  140 139    POTASSIUM  --   --   --  4.3 4.0   CHLORIDE  --   --   --  103 103   CO2  --   --   --   --  26   BUN  --   --   --  17 15.0   CR  --   --   --  0.8 0.84   ANIONGAP  --   --   --   --  10   BROOK  --   --   --   --  9.5   * 122* 98 115* 122*       Recent Results (from the past 24 hour(s))   XR Chest Port 1 View    Narrative    CHEST PORTABLE ONE VIEW 2024 8:22 AM     HISTORY: Chest pain.    COMPARISON: 2021.      Impression    IMPRESSION: No acute cardiopulmonary disease.    SHANTELLE RAE MD         SYSTEM ID:  [id number]   Echocardiogram Limited    Narrative    [id number]  [id number]  [id number]  [id number]^ANURAG^JEMMA^KLEBER     [facility]  Echocardiography Laboratory  [address] [place] [address]     Name: JR. PRASHANTH KINDRA JR. RIDGE  MRN: [id number]  : 1950  Study Date: 2024 09:25 AM  Age: 73 yrs  Gender: Male  Patient Location: [facility]  Reason For Study: CAD  Ordering Physician: JEMMA OSBORN  Referring Physician: Zahira Morales, SHELIA  Performed By: Garland Jamison RDCS     BSA: 2.1 m2  Height: 70 in  Weight: 209 lb  ______________________________________________________________________________  Procedure  Limited Portable Echo Adult. (Emergent exam, abbreviated study performed).  ______________________________________________________________________________  Interpretation Summary     Limited echocardiogram performed emergently in the Cath Lab.  Normal left ventricular size with moderately reduced systolic function.  Visually estimated LVEF 40-45%.  Inferolateral, distal lateral and apical hypokinesis. Limited views obtained.  Grossly normal right ventricular systolic function.  Cardiac valves not well-visualized.  No pericardial effusion.     There is no comparison study available.  ______________________________________________________________________________  ______________________________________________________________________________  Report  approved by: Dr Yasmine Ferreira 02/27/2024 09:52 AM     ______________________________________________________________________________      Cardiac Catheterization    Narrative      Prox RCA lesion is 100% stenosed.    RPDA lesion is 99% stenosed.    Origin to Prox Graft lesion is 35% stenosed.    Dist LM lesion is 20% stenosed.    Prox Cx lesion is 99% stenosed.    2nd Mrg lesion is 100% stenosed.    Prox LAD lesion is 99% stenosed.    Prox Graft lesion is 80% stenosed.    Origin to Prox Graft lesion is 70% stenosed.    Ramus-1 lesion is 100% stenosed.    Ramus-2 lesion is 95% stenosed.    Highly  complex anatomy. See cardio consult regarding compliance issues   and prior CAD  The infarct vessel is the RI--this his largest vessel and had only mild   disease at time of 2009 bypass.. The mid Lcx into OM2 also had mild   disease in 2009 and is now occluded and collateralized from RCA. OM2 is   small/moderate sized vessel.    Compliance issues with medication is a serious concern. The RI vessel is   the culprit vessel but if I stented it today I would have to cross from   LMA into RA which may preclude my ability to later fix the Lcx/OM2.   Therefore today I did PTCB to the ostium of RI. Once I opened that we   found additional disease throughout the mid/distal RI including one area   of 99% disease. Both the ostium and the mid /Distal RI had PTCB with good   result. HIs chest pain markedly improved.    I did discuss the case/procedure approach with Dr Miranda and Dr Frank   from CV surgery  Our plan is to continue brilinta now, eventually start him on PCSK9 and   GDMT. Discuss possible hybrid redo CABG  (Lad, Diag, RI, OM2) with grafts   and stents for segments not approachable by CABG vs a highly staged   PCI/stent. That is predicated on his commitment to take antiplt meds   (currently not even taking ASA)  IF staged I would stent LCx/OM2 first then stent distal RI then proximal   RI likely stenting from LMA into  RI.   Later recath via the Left radial (I could not get LIMA subselective from   leg due to tortuousity) and if needed stent ostium of LIMA then stent   ostium of VG to Diag and consider stent R-PDA via the VG.

## 2024-02-28 NOTE — PHARMACY-RX INSURANCE COVERAGE
2/28 Test claim for Repatha and Praluent- both medications are non-formulary and will require a prior auth.   Thank you for allowing me to help with your patient    Brittney Olea [facility]  Pharmacy Discharge Liaison  [place]

## 2024-02-28 NOTE — PLAN OF CARE
Orientation:A&ox4  Vss afebrile.   02: ra 95-97%  Tele: sr/sb 58-66  LS:clear  GI:bs+, tolerating cardiac diet for early dinner.   : voiding in urine and bscX1  Skin: right groin angio site. Site c/d/i  Activity: br until 1535.  Bsc and sat in chair for 15 minutes.   Pain: c/o cp 0-2. Nitroglycerin gtt infusing.    Plan: pt to transfer to [facility] once bed is available. Glucose 98, 122. Tropx 2. Tele.  Family at bedside and aware of plan.       1745: pt sitting in chair. Family called out that pt felt like he was going to vomit. Pt started spitting and coughing, and was unresponsive for few seconds. Suctioned pt mouth.  Bp checked and pt was able to communicate again. Pt diaphoretic as well.  Pt felt like he was dreaming while unresponsive.  Pt got back to bed with assist of 2. Pt denies chest pain or nausea once in bed. Rechecked bp, stable. Groin site c/d/I. +pp.

## 2024-02-28 NOTE — PLAN OF CARE
ICU End of Shift Summary.  For vital signs and complete assessments, please see documentation flowsheets.      Pertinent assessments: Pt A&O, able to make needs known. Tele SR. Denies chest pain. Angio site CDI. On RA. VSS. Up to BSC x1 with A2. Report called and given to Lisa at [facility].       Plan (Upcoming Events): Transfer to [facility], spouse and daughter at bedside and updated on plan. Belongings sent with pts spouse and daughter.        Bedside Shift Report Completed : yes  Bedside Safety Check Completed: yes

## 2024-02-28 NOTE — CODE/RAPID RESPONSE
"[facility]    House MATHIEU RRT Note  2/28/2024   Time Called: 0016    RRT called for: Chest heaviness    Assessment & Plan     Substernal chest heaviness 2/2 recent STEMI with severe CAD with PMH CAD s/p CABG.  - Upon arrival, pt lying in bed, awake, alert, in no overt distress, benign appearing, flushed facial appearance.  Pt notes over the course of the evening, pt having increased substernal chest heaviness.  Pt also notes BUE \"weakness\".  Pt reports feeling unwell, reports SOB, diaphoresis.  Pt's VS noting SBP low 100s on 70 mcg/min nitroglycerin, HR 60s, RR 10s, O2 sats > 92% on RA, afebrile.    INTERVENTIONS:  - Stat EKG obtained at time of arrival  - Continue to increase nitroglycerin infusion as able to achieve chest pain free  - Noted pt has PRN dilaudid available; pt declines at this time as reports concern for constipation  - Has bowel regimen available  - Requested for nursing to contact admitting hospitalist to inquire if heparin infusion deemed appropriate    At the end of the RRT pt remains hemodynamically stable, in no overt distress    Discussed with and defer further cares to nursing and nursing updated Dr. Rosa, admitting hospitalist    Interval History     Lokesh Shipley Jr. is a 73 year old male who was admitted on 2/27/2024 for chest pain.    Medical history significant for:   Past Medical History:   Diagnosis Date    Coronary artery disease     Heart attack (H) 2000    Hypertension     Other chronic pain     low back and radiates down both legs.  Also, numbness & tingling down both legs.    Stented coronary artery 2000    x1     Code Status: Full Code    Allergies   Allergies   Allergen Reactions    Pcn [Penicillins] Shortness Of Breath    Sulfa Antibiotics Shortness Of Breath       Physical Exam   Vital Signs with Ranges:  Temp:  [97.2  F (36.2  C)-98.2  F (36.8  C)] 98.2  F (36.8  C)  Pulse:  [56-82] 65  Resp:  [4-28] 15  BP: ()/(33-83) 104/59  SpO2:  [92 " %-99 %] 96 %  I/O last 3 completed shifts:  In: 300 [P.O.:300]  Out: -     Constitutional: Pt lying in bed, awake, alert, in no overt distress, benign appearing, flushed facial appearance  Neck: No upper airway wheezes or stridor noted  Pulmonary: In no apparent respiratory distress, clear to auscultation bilaterally, no crackles or wheezes noted  Cardiovascular: Regular rate and rhythm, normal S1S2, no murmur, rub or gallop noted  GI: Round, soft, nondistended, nontender to palpation, no guarding or rebound tenderness noted  Skin/Integumen: Warm, dry, pink, flushed facial appearance  Neuro: Awake, alert, clear speech, no obvious focal neuro deficit noted  Psych:  Calm  Extremities: No peripheral edema    Data     EKG:  Interpreted by SINTIA Cameron CNP  Time reviewed: 0022  Symptoms at time of EKG: Substernal chest heaviness   Rhythm: 1 degree AV block  Rate: Normal  Axis: Normal  Ectopy: none  Conduction: 1st degree AV block  ST Segments/ T Waves: T wave inversion V5 and V6  Q Waves: III  Comparison to prior: No further ST depressions or ST elevations as previously noted 2/27/24 0802    Clinical Impression: no acute changes     Latest Reference Range & Units 02/27/24 08:03 02/27/24 12:56 02/27/24 13:38 02/27/24 23:20   Troponin T, High Sensitivity <=22 ng/L 31 (H) 452 (HH) 527 (HH) 1,328 (HH)   (HH): Data is critically high  (H): Data is abnormally high    Medical Decision Making       25 MINUTES SPENT BY ME on the date of service doing chart review, history, exam, documentation & further activities per the note.       SINTIA Cameron CNP  Hospitalist-House MATHIEU  Hospitalist Service  Securely message with [employer] (more info)  Text page via [employer] Paging/Directory

## 2024-02-28 NOTE — CONSULTS
"CARDIOTHORACIC SURGERY CONSULT NOTE  2/28/2024      Reason for Consult: STEMI with severe multivessel coronary artery disease      ASSESSMENT/PLAN:   Lokesh Shipley Jr. is a 73 year old male with a past medical history of s/p CABG x 3 (LIMA to LAD, SVG to diag, SVG to RCA) in 2009, hypertension, hyperlipidemia, medication compliance who presented with STEMI and chest pain who was transferred to [place] by EMS. Patient underwent coronary angiogram which showed multivessel coronary artery disease with likely culprit lesion being a large ramus. Echo cardiogram done with EF of 40-45%. Per report valves not visualized well. Previous vein harvesting from L leg.    Pt/family would like team to know that pt was \"non compliant\" due to him not tolerating statins. States he was tried on numerous statins and was feeling like he was \"89 years old.\"    Last Brilinta dose was on 2/27/24.    - will review with surgeon plan between redo CABG/complex PCI  - Will obtain vein mapping, bilateral carotid duplex US, CT chest w/o contrast  - there is a question about doing a cardiac MRI, will review with surgeon  - Other cares per primary team  - Thank you for the opportunity to participate in the care of this patient.    STS Risk Score for Isolated CABG  Operative mortality 3.31%  Morbidity and mortality 11.5%  Stroke 1.1%  Renal failure 2.11%  Reoperation 3.85%  Prolonged ventilation 7.69%  Deep sternal wound inf 0.168%  Long hospital stay (>14 days) 5.6%  Short hospital stay (<6 days) 42.5%      Patient and plan discussed with attending, Dr Zac Veronica PA-C  Cardiothoracic Surgery  Pager [phone]    ________________________________________________________________________________________________    HPI:   Patient is a 73 year old male who presented with chest pain and found to have STEMI. Pt states that over the previous couple of weeks he was noting some discomfort/pain in his chest that was short lived. States that he may " "have used nitroglycerin once in those couple of weeks. Patient underwent coronary angiogram which showed severe multivessel coronary artery disease. Pt presented to the ED via EMS for evaluation of chest pain. The patient reported sudden onset of shortness of breath and chest pain in the morning. He describes feeling as \"chest pressure\" and rated his pain at a 7 out of 10. He states that the pain is localized to the sternal region and nonradiating. The chest pain was not exacerbated by any activity. The patient took 3 nitroglycerin and EMS administered 3 nitroglycerin en route. EMS reports blood pressure reading of 122/78. The patient endorses frequent alcohol use, shortness of breath, and weakness in the upper extremities. He denies vomiting, diffuse diaphoresis, fever, cough, back pain, edema, or pain in the lower extremities. He denies taking blood thinners. The patient has a history of a previous myocardial infarction with Stent in 1998 and CABG surgery in 2009. He reports he has experienced this pain previously.             Patient currently has no compliants but is on a heparin and nitroglycerin gtt. He otherwise denies any chest pain, SOB, lightheadedness, dizziness, palpitations, LE edema. Denies any history of cancer, radiation or chemo.    PMH:  Past Medical History:   Diagnosis Date    Coronary artery disease     Heart attack (H) 2000    Hypertension     Other chronic pain     low back and radiates down both legs.  Also, numbness & tingling down both legs.    Stented coronary artery 2000    x1       PSH:  Past Surgical History:   Procedure Laterality Date    angiogram with stent placement      BACK SURGERY      low back discectomy    CARDIAC SURGERY  2009    CABG x 3    CV CORONARY ANGIOGRAM N/A 2/27/2024    Procedure: Coronary Angiogram;  Surgeon: Moe Rosales MD;  Location:  HEART CARDIAC CATH LAB    CV INTRAVASULAR ULTRASOUND N/A 2/27/2024    Procedure: Intravascular Ultrasound;  Surgeon: " Moe Rosales MD;  Location: [facility] CARDIAC CATH LAB    CV PCI N/A 2/27/2024    Procedure: Percutaneous Coronary Intervention;  Surgeon: Moe Rosales MD;  Location:  HEART CARDIAC CATH LAB    OPTICAL TRACKING SYSTEM FUSION SPINE POSTERIOR LUMBAR TWO LEVELS N/A 3/12/2020    Procedure: L3-4 and L4-5 decompressive laminectomies with medial facetectomies with decompression and exposure of the L3, L4 and L5 roots bilaterally  Placement of Nuvasive Reline traction pedicle screws bilaterally from L3-5  L3-5 posterior lateral fusion with placement of locally harvested autologous bone;  Surgeon: Jose Lundberg MD;  Location:  OR       FH:  No family history on file.    SH:  Social History     Socioeconomic History    Marital status:    Tobacco Use    Smoking status: Never    Smokeless tobacco: Never   Substance and Sexual Activity    Alcohol use: Yes     Comment: 0-4 weekly    Drug use: Never       Home Meds:  Medications Prior to Admission   Medication Sig Dispense Refill Last Dose    metoprolol tartrate (LOPRESSOR) 50 MG tablet Take 50 mg by mouth daily   2/26/2024 at am    olmesartan (BENICAR) 20 MG tablet Take 20 mg by mouth daily   2/26/2024 at pm    acetaminophen (TYLENOL) 325 MG tablet Take 2 tablets (650 mg) by mouth every 4 hours as needed for mild pain or headaches       aspirin 81 MG EC tablet Take 1 tablet (81 mg) by mouth daily Start tomorrow. 30 tablet 3     nitroGLYcerin (NITROSTAT) 0.4 MG sublingual tablet For chest pain place 1 tablet under the tongue every 5 minutes for 3 doses. If symptoms persist 5 minutes after 1st dose call 911.       ticagrelor (BRILINTA) 90 MG tablet Take 1 tablet (90 mg) by mouth every 12 hours        Allergies:  Allergies   Allergen Reactions    Pcn [Penicillins] Shortness Of Breath    Sulfa Antibiotics Shortness Of Breath     ROS: 10 point ROS neg other than the symptoms noted above in the HPI.      Physical Exam:  Temp:  [97.2  F (36.2  C)-98.2   F (36.8  C)] 97.9  F (36.6  C)  Pulse:  [56-82] 61  Resp:  [9-28] 14  BP: ()/(33-81) 101/70  SpO2:  [92 %-99 %] 98 %  Gen: up in bed, comfortable, -O2  CV: RRR, telemetry SR 60s, -edema LE, +dorsalis pedis pulses 2+ bilaterally  Pulm: clear to auscultation bilaterally, no rhonchi or wheezes  Abd: BS+, NT/ND, sfot  Ext: no lower extremity edema  Neuro: grossly normal  Psych: calm, cooperative   +heparin gtt  +nitroglycerin gtt      Labs:  ABG No lab results found in last 7 days.  CBC  Recent Labs   Lab 02/28/24 0447 02/27/24 0819 02/27/24  0803   WBC 8.5  --  5.6   HGB 11.6* 13.9 14.0     --  326     BMP  Recent Labs   Lab 02/28/24 0447 02/27/24 2019 02/27/24  1601 02/27/24  1218 02/27/24  0819 02/27/24  0803     --   --   --  140 139   POTASSIUM 4.1  --   --   --  4.3 4.0   CHLORIDE 103  --   --   --  103 103   CO2 24  --   --   --   --  26   BUN 10.5  --   --   --  17 15.0   CR 0.74  --   --   --  0.8 0.84   * 114* 122* 98 115* 122*     LFTNo lab results found in last 7 days.  PancreasNo lab results found in last 7 days.    Imaging:  Recent Results (from the past 24 hour(s))   Cardiac Catheterization    Narrative      Prox RCA lesion is 100% stenosed.    RPDA lesion is 99% stenosed.    Origin to Prox Graft lesion is 35% stenosed.    Dist LM lesion is 20% stenosed.    Prox Cx lesion is 99% stenosed.    2nd Mrg lesion is 100% stenosed.    Prox LAD lesion is 99% stenosed.    Prox Graft lesion is 80% stenosed.    Origin to Prox Graft lesion is 70% stenosed.    Ramus-1 lesion is 100% stenosed.    Ramus-2 lesion is 95% stenosed.    Highly  complex anatomy. See cardio consult regarding compliance issues   and prior CAD  The infarct vessel is the RI--this his largest vessel and had only mild   disease at time of 2009 bypass.. The mid Lcx into OM2 also had mild   disease in 2009 and is now occluded and collateralized from RCA. OM2 is   small/moderate sized vessel.    Compliance issues with  medication is a serious concern. The RI vessel is   the culprit vessel but if I stented it today I would have to cross from   LMA into RA which may preclude my ability to later fix the Lcx/OM2.   Therefore today I did PTCB to the ostium of RI. Once I opened that we   found additional disease throughout the mid/distal RI including one area   of 99% disease. Both the ostium and the mid /Distal RI had PTCB with good   result. HIs chest pain markedly improved.    I did discuss the case/procedure approach with Dr Miranda and Dr Frank   from CV surgery  Our plan is to continue brilinta now, eventually start him on PCSK9 and   GDMT. Discuss possible hybrid redo CABG  (Lad, Diag, RI, OM2) with grafts   and stents for segments not approachable by CABG vs a highly staged   PCI/stent. That is predicated on his commitment to take antiplt meds   (currently not even taking ASA)  IF staged I would stent LCx/OM2 first then stent distal RI then proximal   RI likely stenting from LMA into RI.   Later recath via the Left radial (I could not get LIMA subselective from   leg due to tortuousity) and if needed stent ostium of LIMA then stent   ostium of VG to Diag and consider stent R-PDA via the VG.

## 2024-02-28 NOTE — PROVIDER NOTIFICATION
MD Notification    Notified Person: MD Rosa  Notification Date/Time: 0015  Notification Interaction: Vocera   Purpose of Notification: Change in CP. Uncomfortable, diaphoretic, /59. Headache - gave Tylenol. Trop went from 527 to 1328. 70mcg of nitro. Reports nausea and dizziness, lightheaded. Please advise.     Orders Received: RRT called   Comments: increased nitroglycerin gtt

## 2024-02-28 NOTE — PHARMACY-ADMISSION MEDICATION HISTORY
Admission medication history completed at [facility]. Please see Pharmacist Admission Medication History note from 2/27/2024.

## 2024-02-28 NOTE — PROGRESS NOTES
[facility]    Hospitalist Progress Note    Assessment & Plan   Lokesh Shipley Jr. is a 73 year old male with past medical history including coronary artery disease with history of MI and prior CABG, hypertension, hyperlipidemia who presents to [facility] with chest pain, found to have NSTEMI and complex anatomy, transferred to [facility] for CV surgery and cardiology consultation for intervention. Admitted on 2/27/2024.      STEMI status post PTCB 2/27/2024   Coronary artery disease with prior MI and CABG  Ischemic cardiomyopathy   Hypertension  Hyperlipidemia  Medication non-adherence  Borderline diabetes type 2  *Presented with severe chest pain  *EKG with STEMI in aVR with diffuse ST depressions. Troponin 31->452->527.  *Echocardiogram with EF 40-45%, inferolateral, distal lateral and apical hypokinesis  - Cardiology and CV surgery consulted, appreciate input, plan between redo CABG versus complex PCI.  Pending final plans.  A cardiac MRI slot is on hold for the patient in case they plan to check viability.  - Aspirin, ticagrelor (on hold), metoprolol, losartan to continue.  Dose adjustment as per cardiology.  - NTG gtt and heparin gtt. to continue  -Continue on telemetry, hemoglobin A1c is 6.1, LDL is 132  - Statin intolerant per notes, plan for eventual PSK9 inhibitor per cardiology      DVT Prophylaxis: heparin drip   Code Status: Full Code     40 MINUTES SPENT BY ME on the date of service doing chart review, history, exam, documentation & further activities per the note.  Disposition: Expected discharge in 1-2 days depending on cardiology plans.  Clinically Significant Risk Factors Present on Admission                # Drug Induced Platelet Defect: home medication list includes an antiplatelet medication   # Hypertension: Home medication list includes antihypertensive(s)      # Obesity: Estimated body mass index is 30.57 kg/m  as calculated from the following:    Height as of  "an earlier encounter on 2/27/24: 1.753 m (5' 9\").    Weight as of this encounter: 93.9 kg (207 lb).              Katie Gaston MD  Text Page   (7am to 6pm)    Interval History   Patient is on 2 L of oxygen, denies any dyspnea, denies any chest pain, overnight events noted, troponins are quite elevated, cardiology and CV surgery discussing plan of care.    -Data reviewed today: I reviewed all new labs and imaging results over the last 24 hours.  Physical Exam     Vital Signs with Ranges  Temp:  [97.2  F (36.2  C)-98.2  F (36.8  C)] 97.9  F (36.6  C)  Pulse:  [56-82] 65  Resp:  [9-28] 14  BP: ()/(33-81) 107/65  SpO2:  [92 %-99 %] 98 %  I/O last 3 completed shifts:  In: 300 [P.O.:300]  Out: 1400 [Urine:1400]    Constitutional: Awake, alert, cooperative, no apparent distress  Respiratory: Bilateral basilar crackles noted.  Cardiovascular: Regular rate and rhythm, normal S1 and S2, and no murmur noted  GI: Normal bowel sounds, soft, non-distended, non-tender  Skin/Integumen: No rashes, no cyanosis, no edema  Neuro : moving all 4 extremities, no focal deficit noted     Medications    heparin 1,150 Units/hr (02/28/24 0733)    - MEDICATION INSTRUCTIONS -      nitroGLYcerin 100 mcg/min (02/28/24 0733)    STATIN NOT PRESCRIBED        aspirin  81 mg Oral Daily    losartan  50 mg Oral Daily    metoprolol tartrate  50 mg Oral BID    sodium chloride (PF)  3 mL Intracatheter Q8H    sodium chloride (PF)  3 mL Intracatheter Q8H    ticagrelor  90 mg Oral Q12H       Data   Recent Labs   Lab 02/28/24  0447 02/27/24  2019 02/27/24  1601 02/27/24  1218 02/27/24  0819 02/27/24  0803   WBC 8.5  --   --   --   --  5.6   HGB 11.6*  --   --   --  13.9 14.0   MCV 87  --   --   --   --  89     --   --   --   --  326     --   --   --  140 139   POTASSIUM 4.1  --   --   --  4.3 4.0   CHLORIDE 103  --   --   --  103 103   CO2 24  --   --   --   --  26   BUN 10.5  --   --   --  17 15.0   CR 0.74  --   --   --  0.8 0.84 "   ANIONGAP 10  --   --   --   --  10   BROOK 8.7*  --   --   --   --  9.5   * 114* 122*   < > 115* 122*    < > = values in this interval not displayed.     Recent Labs   Lab 02/28/24  0447 02/27/24 2019 02/27/24  1601 02/27/24  1218 02/27/24  0819   * 114* 122* 98 115*       Imaging:   Recent Results (from the past 24 hour(s))   Cardiac Catheterization    Narrative      Prox RCA lesion is 100% stenosed.    RPDA lesion is 99% stenosed.    Origin to Prox Graft lesion is 35% stenosed.    Dist LM lesion is 20% stenosed.    Prox Cx lesion is 99% stenosed.    2nd Mrg lesion is 100% stenosed.    Prox LAD lesion is 99% stenosed.    Prox Graft lesion is 80% stenosed.    Origin to Prox Graft lesion is 70% stenosed.    Ramus-1 lesion is 100% stenosed.    Ramus-2 lesion is 95% stenosed.    Highly  complex anatomy. See cardio consult regarding compliance issues   and prior CAD  The infarct vessel is the RI--this his largest vessel and had only mild   disease at time of 2009 bypass.. The mid Lcx into OM2 also had mild   disease in 2009 and is now occluded and collateralized from RCA. OM2 is   small/moderate sized vessel.    Compliance issues with medication is a serious concern. The RI vessel is   the culprit vessel but if I stented it today I would have to cross from   LMA into RA which may preclude my ability to later fix the Lcx/OM2.   Therefore today I did PTCB to the ostium of RI. Once I opened that we   found additional disease throughout the mid/distal RI including one area   of 99% disease. Both the ostium and the mid /Distal RI had PTCB with good   result. HIs chest pain markedly improved.    I did discuss the case/procedure approach with Dr Miranda and Dr Frank   from CV surgery  Our plan is to continue brilinta now, eventually start him on PCSK9 and   GDMT. Discuss possible hybrid redo CABG  (Lad, Diag, RI, OM2) with grafts   and stents for segments not approachable by CABG vs a highly staged   PCI/stent.  That is predicated on his commitment to take antiplt meds   (currently not even taking ASA)  IF staged I would stent LCx/OM2 first then stent distal RI then proximal   RI likely stenting from LMA into RI.   Later recath via the Left radial (I could not get LIMA subselective from   leg due to tortuousity) and if needed stent ostium of LIMA then stent   ostium of VG to Diag and consider stent R-PDA via the VG.

## 2024-02-28 NOTE — PROGRESS NOTES
[facility]  Cardiology Progress Note  Date of Service: 02/28/2024  Primary Cardiologist: Dr. Mirlees    Assessment & Plan    Lokesh Shipley Jr. is a 73 year old male admitted on 2/27/2024 with chest pain.     Assessment:  1.  STEMI s/p PTCB to ramus 2/27/24   - Last dose of brilinta 2/27/24  2. Coronary artery disease - s/p CABG x 3 in 2/2009 with LIMA-LAD, SVG to 1st diagonal and SVG to distal RCA,   3. Ischemic cardiomyopathy - LVEF 40-45%  4. Hyperlipidemia    - Intolerant to statins and zetia  6. Hypertension  7. Medication noncompliance - has been off statin due to intolerance and not stopped aspirin many years ago, additional lost to cardiology follow up last visit in 2013    Met with patient and family at bedside, reviewed coronary angiogram from yesterday, including PTCB completed to ramus and remaining complex coronary disease. CV surgery has been consulted. Additionally noted that complex stenting could be considered. Currently he stable from cardiac standpoint, chest pain free. Have placed a cMRI slot on Friday on hold if viability assessment is needed (this was the first available slot).     To optimize his GDMT for ischemic cardiomyopathy recommend changing metoprolol tartrate to metoprolol succinate.     Plan:   CV surgery consult pending  cMRI slot placed on hold for Friday if viability assessment needed, this if the first available slot.   Aspirin 81mg daily  Losartan 50mg daily   CHANGE Metoprolol tartrate 50mg BID to metoprolol XL given ischemic cardiomyopathy.   HOLD Brilinta 90mg BID - until further input from CV surgery  Heparin gtt  Nitroglycerin gtt   Anderson out PSK9i for consideration.     SINTIA Casillas [facility]  Pager: [phone]      Interval History   RRT overnight after arriving to [facility] due to chest pain.     Denies chest pain. Denies shortness of breath. Denies lightheadedness/dizziness.     Physical Exam   Temp: 97.9  F (36.6  C)  Temp src: Oral BP: 96/58 Pulse: 62   Resp: 14 SpO2: 97 % O2 Device: Nasal cannula Oxygen Delivery: 2 LPM (for comfort)  Vitals:    02/27/24 2200   Weight: 93.9 kg (207 lb)     GEN: well nourished, in no acute distress.  HEENT:  Pupils equal, round. Sclerae nonicteric.   NECK: Supple, no masses appreciated.   C/V:  Regular rate and rhythm, no murmur, rub or gallop.    RESP: Respirations are unlabored. Clear to auscultation bilaterally without wheezing, rales, or rhonchi.  GI: Abdomen soft, nontender.  EXTREM: No LE edema.  NEURO: Alert and oriented, cooperative.  SKIN: Warm and dry.     Medications    heparin 1,150 Units/hr (02/28/24 0733)    - MEDICATION INSTRUCTIONS -      nitroGLYcerin 100 mcg/min (02/28/24 0733)    STATIN NOT PRESCRIBED        aspirin  81 mg Oral Daily    losartan  50 mg Oral Daily    metoprolol tartrate  50 mg Oral BID    sodium chloride (PF)  3 mL Intracatheter Q8H    sodium chloride (PF)  3 mL Intracatheter Q8H    ticagrelor  90 mg Oral Q12H       Data   Last 24 hours labs reviewed     Echo: 2/27/24  Limited echocardiogram performed emergently in the Cath Lab.  Normal left ventricular size with moderately reduced systolic function.  Visually estimated LVEF 40-45%.  Inferolateral, distal lateral and apical hypokinesis. Limited views obtained.  Grossly normal right ventricular systolic function.  Cardiac valves not well-visualized.  No pericardial effusion.     There is no comparison study available.    Last ischemic eval: coronary angiogram 2/27/24      Prox RCA lesion is 100% stenosed.    RPDA lesion is 99% stenosed.    Origin to Prox Graft lesion is 35% stenosed.    Dist LM lesion is 20% stenosed.    Prox Cx lesion is 99% stenosed.    2nd Mrg lesion is 100% stenosed.    Prox LAD lesion is 99% stenosed.    Prox Graft lesion is 80% stenosed.    Origin to Prox Graft lesion is 70% stenosed.    Ramus-1 lesion is 100% stenosed.    Ramus-2 lesion is 95% stenosed.     Highly  complex anatomy. See  cardio consult regarding compliance issues and prior CAD  The infarct vessel is the RI--this his largest vessel and had only mild disease at time of 2009 bypass.. The mid Lcx into OM2 also had mild disease in 2009 and is now occluded and collateralized from RCA. OM2 is small/moderate sized vessel.    Compliance issues with medication is a serious concern. The RI vessel is the culprit vessel but if I stented it today I would have to cross from LMA into RA which may preclude my ability to later fix the Lcx/OM2. Therefore today I did PTCB to the ostium of RI. Once I opened that we found additional disease throughout the mid/distal RI including one area of 99% disease. Both the ostium and the mid /Distal RI had PTCB with good result. HIs chest pain markedly improved.     I did discuss the case/procedure approach with Dr Miranda and Dr Frank from CV surgery  Our plan is to continue brilinta now, eventually start him on PCSK9 and GDMT. Discuss possible hybrid redo CABG  (Lad, Diag, RI, OM2) with grafts and stents for segments not approachable by CABG vs a highly staged PCI/stent. That is predicated on his commitment to take antiplt meds (currently not even taking ASA)  IF staged I would stent LCx/OM2 first then stent distal RI then proximal RI likely stenting from LMA into RI.   Later recath via the Left radial (I could not get LIMA subselective from leg due to tortuousity) and if needed stent ostium of LIMA then stent ostium of VG to Diag and consider stent R-PDA via the VG.

## 2024-02-29 ENCOUNTER — APPOINTMENT (OUTPATIENT)
Dept: CARDIOLOGY | Facility: CLINIC | Age: 74
DRG: 322 | End: 2024-02-29
Attending: NURSE PRACTITIONER
Payer: COMMERCIAL

## 2024-02-29 ENCOUNTER — APPOINTMENT (OUTPATIENT)
Dept: CT IMAGING | Facility: CLINIC | Age: 74
DRG: 322 | End: 2024-02-29
Attending: SURGERY
Payer: COMMERCIAL

## 2024-02-29 ENCOUNTER — APPOINTMENT (OUTPATIENT)
Dept: CARDIOLOGY | Facility: CLINIC | Age: 74
DRG: 322 | End: 2024-02-29
Attending: SURGERY
Payer: COMMERCIAL

## 2024-02-29 LAB
ABO/RH(D): NORMAL
ANTIBODY SCREEN: NEGATIVE
LVEF ECHO: NORMAL
POTASSIUM SERPL-SCNC: 3.8 MMOL/L (ref 3.4–5.3)
SPECIMEN EXPIRATION DATE: NORMAL
UFH PPP CHRO-ACNC: 0.31 IU/ML

## 2024-02-29 PROCEDURE — 250N000011 HC RX IP 250 OP 636: Performed by: NURSE PRACTITIONER

## 2024-02-29 PROCEDURE — 71250 CT THORAX DX C-: CPT

## 2024-02-29 PROCEDURE — 75574 CT ANGIO HRT W/3D IMAGE: CPT

## 2024-02-29 PROCEDURE — 255N000002 HC RX 255 OP 636: Performed by: STUDENT IN AN ORGANIZED HEALTH CARE EDUCATION/TRAINING PROGRAM

## 2024-02-29 PROCEDURE — 85520 HEPARIN ASSAY: CPT | Performed by: INTERNAL MEDICINE

## 2024-02-29 PROCEDURE — 36415 COLL VENOUS BLD VENIPUNCTURE: CPT | Performed by: INTERNAL MEDICINE

## 2024-02-29 PROCEDURE — 75574 CT ANGIO HRT W/3D IMAGE: CPT | Mod: 26 | Performed by: INTERNAL MEDICINE

## 2024-02-29 PROCEDURE — 84132 ASSAY OF SERUM POTASSIUM: CPT | Performed by: INTERNAL MEDICINE

## 2024-02-29 PROCEDURE — 99233 SBSQ HOSP IP/OBS HIGH 50: CPT | Mod: 25 | Performed by: NURSE PRACTITIONER

## 2024-02-29 PROCEDURE — 999N000208 ECHOCARDIOGRAM COMPLETE

## 2024-02-29 PROCEDURE — 86900 BLOOD TYPING SEROLOGIC ABO: CPT | Performed by: SURGERY

## 2024-02-29 PROCEDURE — 36415 COLL VENOUS BLD VENIPUNCTURE: CPT | Performed by: SURGERY

## 2024-02-29 PROCEDURE — 93306 TTE W/DOPPLER COMPLETE: CPT | Mod: 26 | Performed by: INTERNAL MEDICINE

## 2024-02-29 PROCEDURE — 999N000111 HC STATISTIC OT IP EVAL DEFER

## 2024-02-29 PROCEDURE — 250N000011 HC RX IP 250 OP 636: Performed by: INTERNAL MEDICINE

## 2024-02-29 PROCEDURE — 250N000013 HC RX MED GY IP 250 OP 250 PS 637: Performed by: INTERNAL MEDICINE

## 2024-02-29 PROCEDURE — 99232 SBSQ HOSP IP/OBS MODERATE 35: CPT | Performed by: INTERNAL MEDICINE

## 2024-02-29 PROCEDURE — 210N000001 HC R&B IMCU HEART CARE

## 2024-02-29 PROCEDURE — 250N000013 HC RX MED GY IP 250 OP 250 PS 637: Performed by: NURSE PRACTITIONER

## 2024-02-29 RX ORDER — IOPAMIDOL 755 MG/ML
500 INJECTION, SOLUTION INTRAVASCULAR ONCE
Status: COMPLETED | OUTPATIENT
Start: 2024-02-29 | End: 2024-02-29

## 2024-02-29 RX ADMIN — LOSARTAN POTASSIUM 50 MG: 50 TABLET, FILM COATED ORAL at 09:02

## 2024-02-29 RX ADMIN — METOPROLOL SUCCINATE 25 MG: 25 TABLET, EXTENDED RELEASE ORAL at 09:02

## 2024-02-29 RX ADMIN — HEPARIN SODIUM 1300 UNITS/HR: 10000 INJECTION, SOLUTION INTRAVENOUS at 16:47

## 2024-02-29 RX ADMIN — DOCUSATE SODIUM 50 MG AND SENNOSIDES 8.6 MG 1 TABLET: 8.6; 5 TABLET, FILM COATED ORAL at 16:46

## 2024-02-29 RX ADMIN — HUMAN ALBUMIN MICROSPHERES AND PERFLUTREN 9 ML: 10; .22 INJECTION, SOLUTION INTRAVENOUS at 11:20

## 2024-02-29 RX ADMIN — IOPAMIDOL 100 ML: 755 INJECTION, SOLUTION INTRAVENOUS at 13:34

## 2024-02-29 RX ADMIN — ASPIRIN 81 MG CHEWABLE TABLET 81 MG: 81 TABLET CHEWABLE at 10:57

## 2024-02-29 RX ADMIN — NITROGLYCERIN 70 MCG/MIN: 20 INJECTION INTRAVENOUS at 10:58

## 2024-02-29 RX ADMIN — NITROGLYCERIN 90 MCG/MIN: 20 INJECTION INTRAVENOUS at 00:25

## 2024-02-29 RX ADMIN — ACETAMINOPHEN 650 MG: 325 TABLET, FILM COATED ORAL at 00:37

## 2024-02-29 ASSESSMENT — ACTIVITIES OF DAILY LIVING (ADL)
ADLS_ACUITY_SCORE: 25
ADLS_ACUITY_SCORE: 27
ADLS_ACUITY_SCORE: 27
ADLS_ACUITY_SCORE: 25
ADLS_ACUITY_SCORE: 27
ADLS_ACUITY_SCORE: 25
ADLS_ACUITY_SCORE: 24
ADLS_ACUITY_SCORE: 27
ADLS_ACUITY_SCORE: 25
ADLS_ACUITY_SCORE: 27
ADLS_ACUITY_SCORE: 27
ADLS_ACUITY_SCORE: 24
ADLS_ACUITY_SCORE: 27
ADLS_ACUITY_SCORE: 27

## 2024-02-29 NOTE — PROGRESS NOTES
"CV Surgery Follow-up    Patient is a 73 year old male with a history of s/p CABG x 3 (LIMA to LAD, SVG to diag, SVG to RCA) in 2009, hypertension, hyperlipidemia, medication compliance who presented with STEMI and chest pain who was transferred to [place] by [place]. Patient underwent coronary angiogram which showed multivessel coronary artery disease with likely culprit lesion being a large ramus. Echo cardiogram done with EF of 40-45%. Per report valves not visualized well. Previous vein harvesting from L leg.     Pt/family would like team to know that pt was \"non compliant\" due to him not tolerating statins. States he was tried on numerous statins and was feeling like he was \"89 years old.\"      -Patient getting around room by self with no issues when met with patient this morning. Continues on IV heparin and nitroglycerin. Operation options discussed with patient, discussed imaging we would like to obtain, and patient wants to think about his options still as well.   - Recommend obtaining Coronary CT  - Surgery TBD pending if LIMA open on CT, may change risk benefit. 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   - Will continue to follow  - Other cares per primary team         Isela Peña PA-C  Cardiothoracic Surgery  Pager: [phone]         "

## 2024-02-29 NOTE — PLAN OF CARE
Sandor is alert, oriented, pleasant, overwhelmed. Denies chest/jaw pain on nitroglycerin at 90 mcg/min. BP stable. Heparin infusing. Met with CV surgery today. Plan pending. Up independently or calls as needed.

## 2024-02-29 NOTE — PLAN OF CARE
Sandor is A+OX4, placed on 2LNC overnight due to desaturation while sleeping,bps were stable overnight, except at 0500 pt had a bp of 82/49 leading to nitroglycerin decreased to 80mcg/min, up SBA of 1, affect was calm and cooperative throughout shift. Pt currently has IV Nitroglycerin and heparin infusing(next 10a 3/1/24 0600), was given tylenol at 0030 for headache 5/10 that was reduced to 1/10 upon reassessment, voiding in the urinal at the bedside with good output. Tele:SR, plan for pt currently pending, will continue with POC.

## 2024-02-29 NOTE — PROGRESS NOTES
[facility]  Cardiology Progress Note  Date of Service: 02/29/2024  Primary Cardiologist: Dr. Mireles    Assessment & Plan    Lokesh Shipley Jr. is a 73 year old male admitted on 2/27/2024 with chest pain.     Assessment:  1.  STEMI s/p PTCB to ramus 2/27/24   - Last dose of brilinta 2/27/24  2. Coronary artery disease - s/p CABG x 3 in 2/2009 with LIMA-LAD, SVG to 1st diagonal and SVG to distal RCA,   3. Ischemic cardiomyopathy - LVEF 40-45%  4. Hyperlipidemia    - Intolerant to statins and zetia  6. Hypertension  7. Medication noncompliance - has been off statin due to intolerance and not stopped aspirin many years ago, additional lost to cardiology follow up last visit in 2013    Met with patient and family at bedside, he remains stable from a cardiac perspective, chest pain free, reviewed plan of care with CV surgery. Recommending coronary CT to further define coronary anatomy, particularly LIMA to help guide surgical recommendations. Order placed, CT notified, TBD if can be completed today.     Revascularization plan still pending redo CABG vs. Complex stenting.     Blood pressures remain soft limiting further titration of GDMT for his ischemic cardiomyopathy, this can be further pursued after surgery.     Plan:   CV surgery following  Coronary CTA to further define coronary anatomy to help guide surgical recommendations.   cMRI tomorrow to assess viability  Aspirin 81mg daily  Losartan 50mg daily   Metoprolol XL 25mg daily   HOLD Brilinta 90mg BID - until further input from CV surgery, last dose 2/27/24  Heparin gtt  Nitroglycerin gtt   Anderson out PSK9i for consideration, will see if clinic staff can help initiate PA to determine pricing.     SINTIA Casillas CNP  [facility]  Pager: [phone]      Interval History   Denies chest pain. Denies shortness of breath. Denies lightheadedness/dizziness.     Physical Exam   Temp: 98.1  F (36.7  C) Temp src: Oral BP: 113/57  Pulse: 68   Resp: 18 SpO2: 96 % O2 Device: Nasal cannula Oxygen Delivery: 2 LPM  Vitals:    02/27/24 2200 02/29/24 0632   Weight: 93.9 kg (207 lb) 91.2 kg (201 lb)     GEN: well nourished, in no acute distress.  HEENT:  Pupils equal, round. Sclerae nonicteric.   NECK: Supple, no masses appreciated.   C/V:  Regular rate and rhythm, no murmur, rub or gallop.    RESP: Respirations are unlabored. Clear to auscultation bilaterally without wheezing, rales, or rhonchi.  GI: Abdomen soft, nontender.  EXTREM: No LE edema.  NEURO: Alert and oriented, cooperative.  SKIN: Warm and dry.     Medications    heparin 1,300 Units/hr (02/28/24 2230)    - MEDICATION INSTRUCTIONS -      nitroGLYcerin 70 mcg/min (02/29/24 0854)    STATIN NOT PRESCRIBED        aspirin  81 mg Oral Daily    losartan  50 mg Oral Daily    metoprolol succinate ER  25 mg Oral Daily    sodium chloride (PF)  3 mL Intracatheter Q8H       Data   Last 24 hours labs reviewed     Echo: 2/27/24  Limited echocardiogram performed emergently in the Cath Lab.  Normal left ventricular size with moderately reduced systolic function.  Visually estimated LVEF 40-45%.  Inferolateral, distal lateral and apical hypokinesis. Limited views obtained.  Grossly normal right ventricular systolic function.  Cardiac valves not well-visualized.  No pericardial effusion.     There is no comparison study available.    Last ischemic eval: coronary angiogram 2/27/24      Prox RCA lesion is 100% stenosed.    RPDA lesion is 99% stenosed.    Origin to Prox Graft lesion is 35% stenosed.    Dist LM lesion is 20% stenosed.    Prox Cx lesion is 99% stenosed.    2nd Mrg lesion is 100% stenosed.    Prox LAD lesion is 99% stenosed.    Prox Graft lesion is 80% stenosed.    Origin to Prox Graft lesion is 70% stenosed.    Ramus-1 lesion is 100% stenosed.    Ramus-2 lesion is 95% stenosed.     Highly  complex anatomy. See cardio consult regarding compliance issues and prior CAD  The infarct vessel is the  RI--this his largest vessel and had only mild disease at time of 2009 bypass.. The mid Lcx into OM2 also had mild disease in 2009 and is now occluded and collateralized from RCA. OM2 is small/moderate sized vessel.    Compliance issues with medication is a serious concern. The RI vessel is the culprit vessel but if I stented it today I would have to cross from LMA into RA which may preclude my ability to later fix the Lcx/OM2. Therefore today I did PTCB to the ostium of RI. Once I opened that we found additional disease throughout the mid/distal RI including one area of 99% disease. Both the ostium and the mid /Distal RI had PTCB with good result. HIs chest pain markedly improved.     I did discuss the case/procedure approach with Dr Miranda and Dr Frank from CV surgery  Our plan is to continue brilinta now, eventually start him on PCSK9 and GDMT. Discuss possible hybrid redo CABG  (Lad, Diag, RI, OM2) with grafts and stents for segments not approachable by CABG vs a highly staged PCI/stent. That is predicated on his commitment to take antiplt meds (currently not even taking ASA)  IF staged I would stent LCx/OM2 first then stent distal RI then proximal RI likely stenting from LMA into RI.   Later recath via the Left radial (I could not get LIMA subselective from leg due to tortuousity) and if needed stent ostium of LIMA then stent ostium of VG to Diag and consider stent R-PDA via the VG.

## 2024-02-29 NOTE — PLAN OF CARE
Cardiac Rehab: Orders received. Chart reviewed and discussed with care team.? Pt up independently in room. Cardiac workup and treatment plan in progress. Will complete orders. Please re-consult cardiac rehab post-op.

## 2024-02-29 NOTE — PROGRESS NOTES
[facility]    Hospitalist Progress Note    Assessment & Plan   Lokesh Shipley Jr. is a 73 year old male with past medical history including coronary artery disease with history of MI and prior CABG, hypertension, hyperlipidemia who presents to [facility] with chest pain, found to have NSTEMI and complex anatomy, transferred to [facility] for CV surgery and cardiology consultation for intervention. Admitted on 2/27/2024.      STEMI status post PTCB 2/27/2024   Coronary artery disease with prior MI and CABG  Ischemic cardiomyopathy   Hypertension  Hyperlipidemia  Medication non-adherence  Borderline diabetes type 2  *Presented with severe chest pain  *EKG with STEMI in aVR with diffuse ST depressions. Troponin 31->452->527.  *Echocardiogram with EF 40-45%, inferolateral, distal lateral and apical hypokinesis  - Cardiology and CV surgery consulted, appreciate input, plan between redo CABG versus complex PCI.  Pending final plans.  A cardiac MRI slot is on hold for the patient in case they plan to check viability.  - Aspirin, ticagrelor (on hold), metoprolol, losartan to continue.  Dose adjustment as per cardiology.  - NTG gtt and heparin gtt. to continue  -Continue on telemetry, hemoglobin A1c is 6.1, LDL is 132  - Statin intolerant per notes, plan for eventual PSK9 inhibitor per cardiology  -History  His plans are still undecided, current discussions ongoing between cardiology and CV surgery, will discuss the diagnosis of borderline diabetes with him in a.m.    DVT Prophylaxis: heparin drip   Code Status: Full Code     37 MINUTES SPENT BY ME on the date of service doing chart review, history, exam, documentation & further activities per the note.  Disposition: Expected discharge in 1-2 days depending on cardiology plans.  Clinically Significant Risk Factors                         # Overweight: Estimated body mass index is 29.68 kg/m  as calculated from the following:    Height as of  "an earlier encounter on 2/27/24: 1.753 m (5' 9\").    Weight as of this encounter: 91.2 kg (201 lb)., PRESENT ON ADMISSION            Katie Gaston MD  Text Page   (7am to 6pm)    Interval History   Patient is currently on room air, denies any chest pain or shortness of breath, he is still unsure about plan of care.    -Data reviewed today: I reviewed all new labs and imaging results over the last 24 hours.  Physical Exam     Vital Signs with Ranges  Temp:  [98.1  F (36.7  C)-99.6  F (37.6  C)] 98.1  F (36.7  C)  Pulse:  [61-82] 68  Resp:  [18] 18  BP: ()/(34-76) 113/57  SpO2:  [91 %-97 %] 96 %  I/O last 3 completed shifts:  In: -   Out: 2450 [Urine:2450]    Constitutional: Awake, alert, cooperative, no apparent distress  Respiratory: Bilateral basilar crackles noted.  Cardiovascular: Regular rate and rhythm, normal S1 and S2, and no murmur noted  GI: Normal bowel sounds, soft, non-distended, non-tender  Skin/Integumen: No rashes, no cyanosis, no edema  Neuro : moving all 4 extremities, no focal deficit noted     Medications    heparin 1,300 Units/hr (02/28/24 2230)    - MEDICATION INSTRUCTIONS -      nitroGLYcerin 70 mcg/min (02/29/24 1058)    STATIN NOT PRESCRIBED        aspirin  81 mg Oral Daily    losartan  50 mg Oral Daily    metoprolol succinate ER  25 mg Oral Daily    sodium chloride (PF)  3 mL Intracatheter Q8H       Data   Recent Labs   Lab 02/29/24  1133 02/28/24  0447 02/27/24  2019 02/27/24  1601 02/27/24  1218 02/27/24  0819 02/27/24  0803   WBC  --  8.5  --   --   --   --  5.6   HGB  --  11.6*  --   --   --  13.9 14.0   MCV  --  87  --   --   --   --  89   PLT  --  272  --   --   --   --  326   NA  --  137  --   --   --  140 139   POTASSIUM 3.8 4.1  --   --   --  4.3 4.0   CHLORIDE  --  103  --   --   --  103 103   CO2  --  24  --   --   --   --  26   BUN  --  10.5  --   --   --  17 15.0   CR  --  0.74  --   --   --  0.8 0.84   ANIONGAP  --  10  --   --   --   --  10   BROOK  --  8.7*  --   --   " --   --  9.5   GLC  --  113* 114* 122*   < > 115* 122*   ALBUMIN  --  3.7  --   --   --   --   --    PROTTOTAL  --  6.1*  --   --   --   --   --    BILITOTAL  --  0.8  --   --   --   --   --    ALKPHOS  --  58  --   --   --   --   --    ALT  --  29  --   --   --   --   --    AST  --  112*  --   --   --   --   --     < > = values in this interval not displayed.     Recent Labs   Lab 02/28/24  0447 02/27/24  2019 02/27/24  1601 02/27/24  1218 02/27/24  0819   * 114* 122* 98 115*       Imaging:   Recent Results (from the past 24 hour(s))   US Lower Extremity Venous Mapping Bilateral    Narrative    EXAM: US LOWER EXTREMITY VENOUS MAPPING BILATERAL  LOCATION: [facility]  DATE: 2/28/2024    INDICATION: Preop CABG  COMPARISON: None.  TECHNIQUE: Ultrasound examination of the lower extremity veins was performed, including gray-scale and compression imaging.     LOWER  EXTREMITY FINDINGS:       VENOUS DIAMETERS  RIGHT GREAT SAPHENOUS VEIN  Upper Thigh: 7.6 - 4.0 mm  Mid Thigh: 4.2 - 3.0 mm  Lower Thigh: 3.4 - 3.3 mm  Knee: 3.6 - 2.6 mm  Upper Calf: 2.6 - 2.5 mm  Mid Calf: 2.5 mm  Lower Calf: 2.5 mm  Ankle: 3.0 mm    LEFT GREAT SAPHENOUS VEIN  Previously harvested    RIGHT SMALL SAPHENOUS VEIN  Not assessed    LEFT SMALL SAPHENOUS VEIN  Not assessed.      Impression    IMPRESSION:  1. Right leg mapping as above.   US Carotid Bilateral    Narrative    EXAM: US CAROTID BILATERAL  LOCATION: [facility]  DATE: 2/28/2024    INDICATION: Preop CABG. Coronary artery disease.  COMPARISON: None.  TECHNIQUE: Duplex exam performed utilizing 2D gray-scale imaging, Doppler interrogation with color-flow and spectral waveform analysis. The percent diameter stenosis is determined using NASCET criteria and Society of Radiologists in Ultrasound Consensus   Criteria.    FINDINGS:    RIGHT: Mild plaque at the bifurcation. The peak systolic velocity in the ICA is less than 125 cm/sec,  consistent with less than 50% stenosis. Normal velocities in the ECA. Antegrade flow within the vertebral artery.     LEFT: No significant plaque noted in the carotid bifurcation. Velocities are slightly elevated with the peak systolic velocity in the internal carotid artery 144 cm/s, however, likely due to vessel tortuosity given lack of atherosclerotic plaque. Normal   velocities in the ECA. Antegrade flow within the vertebral artery.    VELOCITY CHART:  CCA   Right: 209 cm/s   Left: 172 cm/s  ICA   Right: 118 cm/s   Left: 144 cm/s  ECA   Right: 135 cm/s   Left: 167 cm/s  ICA/CCA PSV Ratio   Right: 1.09   Left: 1.63      Impression    IMPRESSION:  1.  Mild plaque formation, velocities consistent with less than 50% stenosis in the right internal carotid artery.  2.  Mild plaque formation. Elevated velocities in the left internal carotid artery, however this is likely secondary to vessel tortuosity. Less than 50% stenosis of the left internal carotid artery.  3.  Flow within the vertebral arteries is antegrade.   CT Chest w/o Contrast    Narrative    CT CHEST WITHOUT CONTRAST  2/29/2024 9:29 AM     HISTORY: Preop CABG, coronary artery disease.    TECHNIQUE: CT scan of the chest was performed without IV contrast.  Multiplanar reformats were obtained. Dose reduction techniques were  used.  CONTRAST: None.    COMPARISON: 9/19/2021    FINDINGS:   LUNGS AND PLEURA: Stable calcified granulomas. Lungs are otherwise  clear.    MEDIASTINUM/AXILLAE: Postop CABG. Normal caliber thoracic aorta. No  ascending aortic calcification.    UPPER ABDOMEN: Negative.      Impression    IMPRESSION:  1.  Postop CABG.  2.  No aortic aneurysm or significant ascending aortic calcification.

## 2024-03-01 LAB
ACT BLD: 244 SECONDS (ref 74–150)
ACT BLD: 271 SECONDS (ref 74–150)
ACT BLD: 305 SECONDS (ref 74–150)
ACT BLD: 321 SECONDS (ref 74–150)
ACT BLD: 325 SECONDS (ref 74–150)
ATRIAL RATE - MUSE: 61 BPM
ATRIAL RATE - MUSE: 66 BPM
CHOLEST SERPL-MCNC: 189 MG/DL
CREAT SERPL-MCNC: 0.75 MG/DL (ref 0.67–1.17)
DIASTOLIC BLOOD PRESSURE - MUSE: NORMAL MMHG
DIASTOLIC BLOOD PRESSURE - MUSE: NORMAL MMHG
EGFRCR SERPLBLD CKD-EPI 2021: >90 ML/MIN/1.73M2
HDLC SERPL-MCNC: 50 MG/DL
INTERPRETATION ECG - MUSE: NORMAL
INTERPRETATION ECG - MUSE: NORMAL
LDLC SERPL CALC-MCNC: 122 MG/DL
NONHDLC SERPL-MCNC: 139 MG/DL
P AXIS - MUSE: 76 DEGREES
P AXIS - MUSE: 82 DEGREES
POTASSIUM SERPL-SCNC: 3.7 MMOL/L (ref 3.4–5.3)
PR INTERVAL - MUSE: 256 MS
PR INTERVAL - MUSE: 258 MS
QRS DURATION - MUSE: 110 MS
QRS DURATION - MUSE: 116 MS
QT - MUSE: 448 MS
QT - MUSE: 460 MS
QTC - MUSE: 463 MS
QTC - MUSE: 469 MS
R AXIS - MUSE: 35 DEGREES
R AXIS - MUSE: 40 DEGREES
SYSTOLIC BLOOD PRESSURE - MUSE: NORMAL MMHG
SYSTOLIC BLOOD PRESSURE - MUSE: NORMAL MMHG
T AXIS - MUSE: 224 DEGREES
T AXIS - MUSE: 245 DEGREES
TRIGL SERPL-MCNC: 84 MG/DL
UFH PPP CHRO-ACNC: 0.21 IU/ML
UFH PPP CHRO-ACNC: 0.41 IU/ML
VENTRICULAR RATE- MUSE: 61 BPM
VENTRICULAR RATE- MUSE: 66 BPM

## 2024-03-01 PROCEDURE — 027236Z DILATION OF CORONARY ARTERY, THREE ARTERIES WITH THREE DRUG-ELUTING INTRALUMINAL DEVICES, PERCUTANEOUS APPROACH: ICD-10-PCS | Performed by: INTERNAL MEDICINE

## 2024-03-01 PROCEDURE — 92937 PRQ TRLUML REVSC CAB GRF 1: CPT | Mod: LD | Performed by: INTERNAL MEDICINE

## 2024-03-01 PROCEDURE — 85520 HEPARIN ASSAY: CPT | Performed by: INTERNAL MEDICINE

## 2024-03-01 PROCEDURE — 85347 COAGULATION TIME ACTIVATED: CPT

## 2024-03-01 PROCEDURE — 250N000013 HC RX MED GY IP 250 OP 250 PS 637: Performed by: INTERNAL MEDICINE

## 2024-03-01 PROCEDURE — C1874 STENT, COATED/COV W/DEL SYS: HCPCS | Performed by: INTERNAL MEDICINE

## 2024-03-01 PROCEDURE — 82565 ASSAY OF CREATININE: CPT | Performed by: INTERNAL MEDICINE

## 2024-03-01 PROCEDURE — 36415 COLL VENOUS BLD VENIPUNCTURE: CPT | Performed by: INTERNAL MEDICINE

## 2024-03-01 PROCEDURE — C9604 PERC D-E COR REVASC T CABG S: HCPCS | Performed by: INTERNAL MEDICINE

## 2024-03-01 PROCEDURE — C1753 CATH, INTRAVAS ULTRASOUND: HCPCS | Performed by: INTERNAL MEDICINE

## 2024-03-01 PROCEDURE — 250N000011 HC RX IP 250 OP 636: Performed by: INTERNAL MEDICINE

## 2024-03-01 PROCEDURE — 93010 ELECTROCARDIOGRAM REPORT: CPT | Mod: XU | Performed by: INTERNAL MEDICINE

## 2024-03-01 PROCEDURE — C1725 CATH, TRANSLUMIN NON-LASER: HCPCS | Performed by: INTERNAL MEDICINE

## 2024-03-01 PROCEDURE — 80061 LIPID PANEL: CPT | Performed by: INTERNAL MEDICINE

## 2024-03-01 PROCEDURE — 93005 ELECTROCARDIOGRAM TRACING: CPT

## 2024-03-01 PROCEDURE — 92978 ENDOLUMINL IVUS OCT C 1ST: CPT | Mod: 26 | Performed by: INTERNAL MEDICINE

## 2024-03-01 PROCEDURE — 250N000009 HC RX 250: Performed by: INTERNAL MEDICINE

## 2024-03-01 PROCEDURE — 99153 MOD SED SAME PHYS/QHP EA: CPT | Performed by: INTERNAL MEDICINE

## 2024-03-01 PROCEDURE — 92978 ENDOLUMINL IVUS OCT C 1ST: CPT | Mod: RI | Performed by: INTERNAL MEDICINE

## 2024-03-01 PROCEDURE — B240ZZ3 ULTRASONOGRAPHY OF SINGLE CORONARY ARTERY, INTRAVASCULAR: ICD-10-PCS | Performed by: INTERNAL MEDICINE

## 2024-03-01 PROCEDURE — 99152 MOD SED SAME PHYS/QHP 5/>YRS: CPT | Performed by: INTERNAL MEDICINE

## 2024-03-01 PROCEDURE — 210N000001 HC R&B IMCU HEART CARE

## 2024-03-01 PROCEDURE — C1769 GUIDE WIRE: HCPCS | Performed by: INTERNAL MEDICINE

## 2024-03-01 PROCEDURE — C9600 PERC DRUG-EL COR STENT SING: HCPCS | Performed by: INTERNAL MEDICINE

## 2024-03-01 PROCEDURE — C1887 CATHETER, GUIDING: HCPCS | Performed by: INTERNAL MEDICINE

## 2024-03-01 PROCEDURE — 92928 PRQ TCAT PLMT NTRAC ST 1 LES: CPT | Mod: RI | Performed by: INTERNAL MEDICINE

## 2024-03-01 PROCEDURE — C1894 INTRO/SHEATH, NON-LASER: HCPCS | Performed by: INTERNAL MEDICINE

## 2024-03-01 PROCEDURE — B2111ZZ FLUOROSCOPY OF MULTIPLE CORONARY ARTERIES USING LOW OSMOLAR CONTRAST: ICD-10-PCS | Performed by: INTERNAL MEDICINE

## 2024-03-01 PROCEDURE — 99232 SBSQ HOSP IP/OBS MODERATE 35: CPT | Mod: 25 | Performed by: INTERNAL MEDICINE

## 2024-03-01 PROCEDURE — 272N000001 HC OR GENERAL SUPPLY STERILE: Performed by: INTERNAL MEDICINE

## 2024-03-01 PROCEDURE — 99233 SBSQ HOSP IP/OBS HIGH 50: CPT | Performed by: INTERNAL MEDICINE

## 2024-03-01 PROCEDURE — 84132 ASSAY OF SERUM POTASSIUM: CPT | Performed by: INTERNAL MEDICINE

## 2024-03-01 PROCEDURE — 250N000013 HC RX MED GY IP 250 OP 250 PS 637: Performed by: NURSE PRACTITIONER

## 2024-03-01 DEVICE — STENT CORONARY DES SYNERGY XD MR US 3.00X38MM H7493941838300: Type: IMPLANTABLE DEVICE | Status: FUNCTIONAL

## 2024-03-01 DEVICE — STENT CORONARY DES SYNERGY XD MR US 3.50X20MM H7493941820350: Type: IMPLANTABLE DEVICE | Status: FUNCTIONAL

## 2024-03-01 DEVICE — STENT CORONARY DES SYNERGY XD MR US 4.00X38MM H7493941838400: Type: IMPLANTABLE DEVICE | Status: FUNCTIONAL

## 2024-03-01 RX ORDER — ASPIRIN 81 MG/1
81 TABLET, CHEWABLE ORAL ONCE
Status: DISCONTINUED | OUTPATIENT
Start: 2024-03-01 | End: 2024-03-01

## 2024-03-01 RX ORDER — HEPARIN SODIUM 1000 [USP'U]/ML
INJECTION, SOLUTION INTRAVENOUS; SUBCUTANEOUS
Status: DISCONTINUED | OUTPATIENT
Start: 2024-03-01 | End: 2024-03-01 | Stop reason: HOSPADM

## 2024-03-01 RX ORDER — LORAZEPAM 2 MG/ML
0.5 INJECTION INTRAMUSCULAR
Status: DISCONTINUED | OUTPATIENT
Start: 2024-03-01 | End: 2024-03-01 | Stop reason: HOSPADM

## 2024-03-01 RX ORDER — ONDANSETRON 2 MG/ML
4 INJECTION INTRAMUSCULAR; INTRAVENOUS EVERY 6 HOURS PRN
Status: DISCONTINUED | OUTPATIENT
Start: 2024-03-01 | End: 2024-03-01

## 2024-03-01 RX ORDER — FENTANYL CITRATE 50 UG/ML
25 INJECTION, SOLUTION INTRAMUSCULAR; INTRAVENOUS
Status: DISCONTINUED | OUTPATIENT
Start: 2024-03-01 | End: 2024-03-02 | Stop reason: HOSPADM

## 2024-03-01 RX ORDER — LIDOCAINE 40 MG/G
CREAM TOPICAL
Status: DISCONTINUED | OUTPATIENT
Start: 2024-03-01 | End: 2024-03-01

## 2024-03-01 RX ORDER — SODIUM CHLORIDE 9 MG/ML
INJECTION, SOLUTION INTRAVENOUS CONTINUOUS
Status: ACTIVE | OUTPATIENT
Start: 2024-03-01 | End: 2024-03-01

## 2024-03-01 RX ORDER — ASPIRIN 81 MG/1
81 TABLET ORAL DAILY
Status: DISCONTINUED | OUTPATIENT
Start: 2024-03-02 | End: 2024-03-02 | Stop reason: HOSPADM

## 2024-03-01 RX ORDER — ATROPINE SULFATE 0.1 MG/ML
0.5 INJECTION INTRAVENOUS
Status: ACTIVE | OUTPATIENT
Start: 2024-03-01 | End: 2024-03-02

## 2024-03-01 RX ORDER — ASPIRIN 325 MG
325 TABLET ORAL ONCE
Status: COMPLETED | OUTPATIENT
Start: 2024-03-01 | End: 2024-03-01

## 2024-03-01 RX ORDER — NALOXONE HYDROCHLORIDE 0.4 MG/ML
0.2 INJECTION, SOLUTION INTRAMUSCULAR; INTRAVENOUS; SUBCUTANEOUS
Status: DISCONTINUED | OUTPATIENT
Start: 2024-03-01 | End: 2024-03-02 | Stop reason: HOSPADM

## 2024-03-01 RX ORDER — ASPIRIN 81 MG/1
243 TABLET, CHEWABLE ORAL ONCE
Status: COMPLETED | OUTPATIENT
Start: 2024-03-01 | End: 2024-03-01

## 2024-03-01 RX ORDER — NITROGLYCERIN 0.4 MG/1
0.4 TABLET SUBLINGUAL EVERY 5 MIN PRN
Status: DISCONTINUED | OUTPATIENT
Start: 2024-03-01 | End: 2024-03-02 | Stop reason: HOSPADM

## 2024-03-01 RX ORDER — NALOXONE HYDROCHLORIDE 0.4 MG/ML
0.4 INJECTION, SOLUTION INTRAMUSCULAR; INTRAVENOUS; SUBCUTANEOUS
Status: DISCONTINUED | OUTPATIENT
Start: 2024-03-01 | End: 2024-03-02 | Stop reason: HOSPADM

## 2024-03-01 RX ORDER — VERAPAMIL HYDROCHLORIDE 2.5 MG/ML
INJECTION, SOLUTION INTRAVENOUS
Status: DISCONTINUED | OUTPATIENT
Start: 2024-03-01 | End: 2024-03-01 | Stop reason: HOSPADM

## 2024-03-01 RX ORDER — FLUMAZENIL 0.1 MG/ML
0.2 INJECTION, SOLUTION INTRAVENOUS
Status: ACTIVE | OUTPATIENT
Start: 2024-03-01 | End: 2024-03-02

## 2024-03-01 RX ORDER — ONDANSETRON 4 MG/1
4 TABLET, ORALLY DISINTEGRATING ORAL EVERY 6 HOURS PRN
Status: DISCONTINUED | OUTPATIENT
Start: 2024-03-01 | End: 2024-03-01

## 2024-03-01 RX ORDER — METOPROLOL TARTRATE 1 MG/ML
5 INJECTION, SOLUTION INTRAVENOUS
Status: DISCONTINUED | OUTPATIENT
Start: 2024-03-01 | End: 2024-03-02 | Stop reason: HOSPADM

## 2024-03-01 RX ORDER — HYDRALAZINE HYDROCHLORIDE 20 MG/ML
10 INJECTION INTRAMUSCULAR; INTRAVENOUS EVERY 4 HOURS PRN
Status: DISCONTINUED | OUTPATIENT
Start: 2024-03-01 | End: 2024-03-02 | Stop reason: HOSPADM

## 2024-03-01 RX ORDER — LORAZEPAM 0.5 MG/1
0.5 TABLET ORAL
Status: DISCONTINUED | OUTPATIENT
Start: 2024-03-01 | End: 2024-03-01 | Stop reason: HOSPADM

## 2024-03-01 RX ORDER — FENTANYL CITRATE 50 UG/ML
INJECTION, SOLUTION INTRAMUSCULAR; INTRAVENOUS
Status: DISCONTINUED | OUTPATIENT
Start: 2024-03-01 | End: 2024-03-01 | Stop reason: HOSPADM

## 2024-03-01 RX ORDER — POTASSIUM CHLORIDE 1500 MG/1
20 TABLET, EXTENDED RELEASE ORAL
Status: COMPLETED | OUTPATIENT
Start: 2024-03-01 | End: 2024-03-01

## 2024-03-01 RX ORDER — ACETAMINOPHEN 325 MG/1
650 TABLET ORAL EVERY 4 HOURS PRN
Status: DISCONTINUED | OUTPATIENT
Start: 2024-03-01 | End: 2024-03-01

## 2024-03-01 RX ORDER — NITROGLYCERIN 5 MG/ML
VIAL (ML) INTRAVENOUS
Status: DISCONTINUED | OUTPATIENT
Start: 2024-03-01 | End: 2024-03-01 | Stop reason: HOSPADM

## 2024-03-01 RX ORDER — IOPAMIDOL 755 MG/ML
INJECTION, SOLUTION INTRAVASCULAR
Status: DISCONTINUED | OUTPATIENT
Start: 2024-03-01 | End: 2024-03-01 | Stop reason: HOSPADM

## 2024-03-01 RX ADMIN — ASPIRIN 81 MG CHEWABLE TABLET 243 MG: 81 TABLET CHEWABLE at 12:01

## 2024-03-01 RX ADMIN — POTASSIUM CHLORIDE 20 MEQ: 1500 TABLET, EXTENDED RELEASE ORAL at 12:01

## 2024-03-01 RX ADMIN — ASPIRIN 81 MG CHEWABLE TABLET 81 MG: 81 TABLET CHEWABLE at 08:19

## 2024-03-01 RX ADMIN — METOPROLOL SUCCINATE 25 MG: 25 TABLET, EXTENDED RELEASE ORAL at 08:19

## 2024-03-01 RX ADMIN — HEPARIN SODIUM 1450 UNITS/HR: 10000 INJECTION, SOLUTION INTRAVENOUS at 09:58

## 2024-03-01 RX ADMIN — DOCUSATE SODIUM 50 MG AND SENNOSIDES 8.6 MG 1 TABLET: 8.6; 5 TABLET, FILM COATED ORAL at 21:18

## 2024-03-01 RX ADMIN — TICAGRELOR 180 MG: 90 TABLET ORAL at 12:02

## 2024-03-01 RX ADMIN — TICAGRELOR 90 MG: 90 TABLET ORAL at 21:18

## 2024-03-01 RX ADMIN — LOSARTAN POTASSIUM 50 MG: 50 TABLET, FILM COATED ORAL at 08:19

## 2024-03-01 ASSESSMENT — ACTIVITIES OF DAILY LIVING (ADL)
ADLS_ACUITY_SCORE: 25
ADLS_ACUITY_SCORE: 25
ADLS_ACUITY_SCORE: 24
ADLS_ACUITY_SCORE: 24
ADLS_ACUITY_SCORE: 25
ADLS_ACUITY_SCORE: 24
ADLS_ACUITY_SCORE: 25
ADLS_ACUITY_SCORE: 25
ADLS_ACUITY_SCORE: 24
ADLS_ACUITY_SCORE: 25
ADLS_ACUITY_SCORE: 24
ADLS_ACUITY_SCORE: 24
ADLS_ACUITY_SCORE: 25
ADLS_ACUITY_SCORE: 23
ADLS_ACUITY_SCORE: 25
ADLS_ACUITY_SCORE: 23
ADLS_ACUITY_SCORE: 23
ADLS_ACUITY_SCORE: 24

## 2024-03-01 NOTE — PRE-PROCEDURE
GENERAL PRE-PROCEDURE:   Procedure:  COR ANGIO POSSIBLE PCI  Date/Time:  3/1/2024 10:58 AM    Written consent obtained?: Yes    Risks and benefits: Risks, benefits and alternatives were discussed    Consent given by:  Patient  Patient states understanding of procedure being performed: Yes    Patient's understanding of procedure matches consent: Yes    Procedure consent matches procedure scheduled: Yes    Expected level of sedation:  Moderate  Appropriately NPO:  Yes  ASA Class:  4  Mallampati  :  Grade 1- soft palate, uvula, tonsillar pillars, and posterior pharyngeal wall visible  Lungs:  Lungs clear with good breath sounds bilaterally  Heart:  Normal heart sounds and rate, RRR and systolic murmur  History & Physical reviewed:  History and physical reviewed and no updates needed  Statement of review:  I have reviewed the lab findings, diagnostic data, medications, and the plan for sedation

## 2024-03-01 NOTE — PRE-PROCEDURE
GENERAL PRE-PROCEDURE:   Procedure:  Coronary angio with staged PCI  Date/Time:  3/1/2024 1:45 PM    Written consent obtained?: Yes    Risks and benefits: Risks, benefits and alternatives were discussed    Consent given by:  Patient  Patient states understanding of procedure being performed: Yes    Patient's understanding of procedure matches consent: Yes    Procedure consent matches procedure scheduled: Yes    Expected level of sedation:  Moderate  Appropriately NPO:  Yes  ASA Class:  4  Mallampati  :  Grade 1- soft palate, uvula, tonsillar pillars, and posterior pharyngeal wall visible  Lungs:  Lungs clear with good breath sounds bilaterally  Heart:  Normal heart sounds and rate  History & Physical reviewed:  History and physical reviewed and no updates needed  Statement of review:  I have reviewed the lab findings, diagnostic data, medications, and the plan for sedation

## 2024-03-01 NOTE — PROGRESS NOTES
Dr. Alfredo notified of consult but seen by Dr Rose today from hospital team. Please call with questions     Dr. Emily Alfredo

## 2024-03-01 NOTE — PLAN OF CARE
A&Ox4. BP 98/66   Pulse 64   Temp 98.4  F (36.9  C) (Oral)   Resp 16   Wt 90.7 kg (199 lb 14.4 oz)   SpO2 96%   BMI 29.52 kg/m   Tele SR. Denies pain. Up with SBA in room. Steady gait. RA. Chest pain free on nitroglycerin gtt at 10 mcg/min. Heparin gtt increased to 1450 units/hr this AM. Heparin 10A checked scheduled for 1227. NPO for CMRI today to assess viability. CABG vs PCI pending.

## 2024-03-01 NOTE — PROGRESS NOTES
[facility]    Cardiology Progress Note     Assessment & Plan     .  STEMI s/p PTCB to ramus 2/27/24              - Last dose of brilinta 2/27/24  2. Coronary artery disease - s/p CABG x 3 in 2/2009 with LIMA-LAD, SVG to 1st diagonal and SVG to distal RCA,   3. Ischemic cardiomyopathy - LVEF 40-45%  4. Hyperlipidemia               - Intolerant to statins and zetia  6. Hypertension  7. Medication noncompliance - has been off statin due to intolerance and not stopped aspirin many years ago, additional lost to cardiology follow up last visit in 2013     Plan:    Discussed with Dr. Miranda who will perform PCI. CVTS discussed case and open surgical approach not needed at this time. Will defer cardiac MRI to outpatient for staged approach considerations of non culprit vessels. Keep npo today.    For now continue GDMT, aspirin 81 mg, losartan, metoprolol, heparin infusion and nitroglycerin infusion. Will be attemting PCSK9i initiation as well pre discharge.     Haylee Altamirano MD  Text Page  (Monday - Friday, 8 am- 5 pm)    Interval History     Cardiac CT was reviewed.     1. Severe native three-vessel disease. Status post bypass grafting.  2. Widely Patent LIMA to the LAD including origin of the LIMA graft as  well as the proximal left subclavian artery. The distal LAD beyond the  anastomosis is not well-visualized on this study.  3. Severe stenosis in the proximal portion of the vein graft to the  diagonal branch.  4. Mild diffuse disease in the vein graft to the right coronary artery  although anastomotic area is not well visualized. The distal right  coronary artery and its branches are not well visualized and cannot be  commented upon due to severe calcification.  5.  Occluded proximal right coronary artery  6. Ostial circumflex with likely severe stenosis, rest of the vessel  not well visualized.  7. The ramus is  medium caliber vessel, likely with severe stenosis in  the proximal  portion. Rest of the vessel is moderately calcified, not  well visualized.       Physical Exam   Temp: 98.4  F (36.9  C) Temp src: Oral BP: 98/66 Pulse: 64   Resp: 16 SpO2: 96 % O2 Device: None (Room air)    Vitals:    02/27/24 2200 02/29/24 0632 03/01/24 0622   Weight: 93.9 kg (207 lb) 91.2 kg (201 lb) 90.7 kg (199 lb 14.4 oz)     Vital Signs with Ranges  Temp:  [98  F (36.7  C)-98.5  F (36.9  C)] 98.4  F (36.9  C)  Pulse:  [63-71] 64  Resp:  [16] 16  BP: ()/(52-83) 98/66  SpO2:  [76 %-100 %] 96 %  I/O last 3 completed shifts:  In: 480 [P.O.:480]  Out: 750 [Urine:750]  Patient Active Problem List   Diagnosis    Fall (on)(from) incline, initial encounter    S/P lumbar fusion    Adrenal abnormality (H24)    Traumatic pneumothorax, initial encounter    Fall, initial encounter    Acute pain of right shoulder    Closed fracture of multiple ribs of right side, initial encounter    Percutaneous transluminal coronary angioplasty status    STEMI (ST elevation myocardial infarction) (H)       Constitutional: Awake, alert, cooperative, no apparent distress  Respiratory: Clear to auscultation bilaterally, no crackles or wheezing  Cardiovascular: Regular rate and rhythm, normal S1 and S2, and no murmur noted  GI: Normal bowel sounds, soft, non-distended, non-tender  Skin/Integumen: No rashes, no cyanosis, no edema  Other:      Medications    heparin 1,450 Units/hr (03/01/24 0958)    - MEDICATION INSTRUCTIONS -      nitroGLYcerin 10 mcg/min (03/01/24 0815)    STATIN NOT PRESCRIBED        aspirin  81 mg Oral Daily    losartan  50 mg Oral Daily    metoprolol succinate ER  25 mg Oral Daily    sodium chloride (PF)  10 mL Intravenous Once    sodium chloride (PF)  3 mL Intracatheter Q8H       Data   Results for orders placed or performed during the hospital encounter of 02/27/24 (from the past 24 hour(s))   Echocardiogram Complete   Result Value Ref Range    LVEF  60-65%     Narrative     [id number]  [id number]  [id number]  [id number]^AC^ELIEL^MARY ALICE     [facility]  Echocardiography Laboratory  [address] [place] [address]     Name: JR. KINDRA ALFORD JR.  MRN: [id number]  : 1950  Study Date: 2024 10:59 AM  Age: 73 yrs  Gender: Male  Patient Location: [facility]  Reason For Study: CAD  Ordering Physician: ELIEL SHEN  Referring Physician: Zahira Morales  Performed By: Radha Stanford     BSA: 2.1 m2  Height: 69 in  Weight: 201 lb  HR: 66  BP: 109/70 mmHg  ______________________________________________________________________________  Procedure  Complete Portable Echo Adult. Optison (NDC #[id number]) given intravenously.  ______________________________________________________________________________  Interpretation Summary     The visual ejection fraction is 60-65%. No regional wall motion abnormalities  noted.  The right ventricle is normal in size and function.  There is no pericardial effusion.  There is mild (1+) aortic regurgitation.  The aortic Sinus(es) of Valsalva are borderline dilated. Measure 4 cms.     EF is better than prior study. Although image quality was very poor on the  limtied study on 24. EF is on this study is normal.  ______________________________________________________________________________  Left Ventricle  The left ventricle is normal in size. There is mild concentric left  ventricular hypertrophy. Left ventricular systolic function is normal. The  visual ejection fraction is 60-65%. No regional wall motion abnormalities  noted.     Right Ventricle  The right ventricle is normal in size and function.     Atria  The left atrium is mildly dilated. Right atrial size is normal.     Mitral Valve  There is mild mitral annular calcification. Thickened mitral valve posterior  leaflet. There is trace to mild mitral regurgitation. There is no mitral valve  stenosis.     Tricuspid Valve  The tricuspid valve is not well  visualized, but is grossly normal. There is  physiologic tricuspid regurgitation. Right ventricular systolic pressure could  not be approximated due to inadequate tricuspid regurgitation.     Aortic Valve  There is mild (1+) aortic regurgitation. The peak AoV pressure gradient is 8.0  mmHg. The mean AoV pressure gradient is 5.0 mmHg. No hemodynamically  significant valvular aortic stenosis.     Pulmonic Valve  The pulmonic valve is not well visualized.     Vessels  The aortic Sinus(es) of Valsalva are borderline dilated. Measure 4 cms.     Pericardium  There is no pericardial effusion.     ______________________________________________________________________________  MMode/2D Measurements & Calculations  IVSd: 1.3 cm  LVIDd: 5.1 cm  LVIDs: 3.2 cm  LVPWd: 1.2 cm  FS: 37.7 %  LV mass(C)d: 255.5 grams  LV mass(C)dI: 123.4 grams/m2     Ao root diam: 4.0 cm  LA dimension: 4.2 cm  asc Aorta Diam: 3.9 cm  LA/Ao: 1.0  LVOT diam: 2.3 cm  LVOT area: 4.1 cm2  Ao root diam index Ht(cm/m): 2.3  Ao root diam index BSA (cm/m2): 2.0  Asc Ao diam index BSA (cm/m2): 1.9  Asc Ao diam index Ht(cm/m): 2.2  LA Volume (BP): 73.9 ml  LA Volume Index (BP): 35.7 ml/m2  RWT: 0.47     TAPSE: 1.9 cm     Doppler Measurements & Calculations  MV E max norman: 82.8 cm/sec  MV A max norman: 76.5 cm/sec  MV E/A: 1.1  MV dec time: 0.27 sec  Ao V2 max: 140.0 cm/sec  Ao max P.0 mmHg  Ao V2 mean: 100.0 cm/sec  Ao mean P.0 mmHg  Ao V2 VTI: 29.8 cm  AILEEN(I,D): 3.0 cm2  AILEEN(V,D): 2.8 cm2  LV V1 max PG: 3.5 mmHg  LV V1 max: 93.8 cm/sec  LV V1 VTI: 21.6 cm  SV(LVOT): 89.6 ml  SI(LVOT): 43.3 ml/m2  PA acc time: 0.12 sec  AV Norman Ratio (DI): 0.67  AILEEN Index (cm2/m2): 1.5  E/E' av.1  Lateral E/e': 5.2  Medial E/e': 12.9  RV S Norman: 12.9 cm/sec     ______________________________________________________________________________  Report approved by: Dary Claros 2024 03:10 PM         Potassium   Result Value Ref Range    Potassium 3.8 3.4 - 5.3  mmol/L   CTA  ANGIOGRAM CORONARY ARTERY    Narrative    Procedure: CTA ANGIOGRAM CORONARY ARTERY     Examination Date: 2/29/2024 1:55 PM     Indication:  Assess the LIMA graft.     Clinical Information: multivessel CAD, undergoing elvaluation for redo  CABG     Ordering Provider: Va Bowen NP    Overall quality of the study: Technically challenging.     PROCEDURE: ECG gated multi-slice computed tomography of the heart with  and without intravenous contrast  ( Isovue 370, 100 cc) was  performed  on a Siemens Dual Source Flash scanner without incident. CTA was  performed in the min dose spiral mode at a heart rate of 64 bpm with  120 kVp. Images were reconstructed and analyzed on a Hy-Drive  workstation. Scan protocol was optimized to minimize radiation  exposure. The total radiation exposure was calculated to be 622 DLP,  and 8.7 mSv.      Impression    IMPRESSION:    1. Severe native three-vessel disease. Status post bypass grafting.  2. Widely Patent LIMA to the LAD including origin of the LIMA graft as  well as the proximal left subclavian artery. The distal LAD beyond the  anastomosis is not well-visualized on this study.  3. Severe stenosis in the proximal portion of the vein graft to the  diagonal branch.  4. Mild diffuse disease in the vein graft to the right coronary artery  although anastomotic area is not well visualized. The distal right  coronary artery and its branches are not well visualized and cannot be  commented upon due to severe calcification.  5.  Occluded proximal right coronary artery  6. Ostial circumflex with likely severe stenosis, rest of the vessel  not well visualized.  7. The ramus is  medium caliber vessel, likely with severe stenosis in  the proximal portion. Rest of the vessel is moderately calcified, not  well visualized.    FINDINGS:    DOMINANCE: Right dominant system.     LEFT MAIN: The left main arises normally from the left coronary cusp  and is severely calcified distally.  Stenosis in this region cannot be  evaluated..    LEFT ANTERIOR DESCENDING: The proximal LAD severely calcified.. There  appears to be near occlusion in the proximal LAD before the origin of  the diagonal branch. The mid to distal LAD is filled by the LIMA  graft. The uterus the anastomosis, distal LAD is not well-visualized  on this study. The diagonal branch is filled by the vein graft to the  diagonal branch.    LEFT CIRCUMFLEX: The circumflex coronary artery is a small caliber  nondominant vessel, not well visualized. There is likely severe  stenosis at the ostium of the circumflex. The ramus division is small  to medium caliber vessel with severe stenosis in the proximal portion.    RIGHT CORONARY ARTERY: The right coronary artery is severely calcified  and occluded. The distal right coronary arteries 3 by the vein graft  to the RCA. The anastomosis is not well visualized. Distal right  coronary artery is severely calcified and stenosis in this region  cannot be evaluated. The distal right coronary artery branches are not  well visualized.    LIMA graft, the LIMA graft appears widely patent. That appears to be  no stenosis in the proximal left subclavian artery.      ADDITIONAL FINDINGS:   The proximal ascending aorta is normal in size.   Normal pulmonary venous anatomy with all pulmonary veins draining into  the left atrium.    Normal pericardial thickness. There is no pericardial effusion.  The proximal pulmonary arteries are not well opacified.  Please review Radiology report for incidental noncardiac findings that  will follow separately.    REFERENCE STENOSIS GRADING  Normal: widely patent without any detectable stenosis   Minimal : <25% stenosis  Mild: 25-49% stenosis  Moderate: 50-69% stenosis  Severe: >70% stenosis  Occluded    Plaque composition: calcified, noncalcified or mixed plaque      ERICKA GUZMAN MD         SYSTEM ID:  [id number]   Radiologist Consult For Cardiology    Narrative    RADIOLOGIST  CONSULT FOR CARDIOLOGY 2/29/2024 1:55 PM    HISTORY: Coronary artery disease.    COMPARISON: Chest CT 2/29/2024.      Impression    IMPRESSION: No significant extracardiac findings.     BERNARD LEACH MD         SYSTEM ID:  [id number]   Heparin Unfractionated Anti Xa Level   Result Value Ref Range    Anti Xa Unfractionated Heparin 0.21 For Reference Range, See Comment IU/mL    Narrative    Therapeutic Range: UFH: 0.25-0.50 IU/mL for low intensity dosing,  0.30-0.70 IU/mL for high intensity dosing DVT and PE.  This test is not validated for other direct factor X inhibitors (e.g. rivaroxaban, apixaban, edoxaban, betrixaban, fondaparinux) and should not be used for monitoring of other medications.   Potassium   Result Value Ref Range    Potassium 3.7 3.4 - 5.3 mmol/L   Creatinine   Result Value Ref Range    Creatinine 0.75 0.67 - 1.17 mg/dL    GFR Estimate >90 >60 mL/min/1.73m2

## 2024-03-01 NOTE — PROGRESS NOTES
"CV Surgery Follow-up    Patient is a 73 year old male with a history of s/p CABG x 3 (LIMA to LAD, SVG to diag, SVG to RCA) in 2009, hypertension, hyperlipidemia, medication compliance who presented with STEMI and chest pain who was transferred to [place] by EMS. Patient underwent coronary angiogram which showed multivessel coronary artery disease with likely culprit lesion being a large ramus. Echo cardiogram done with EF of 40-45%. Per report valves not visualized well. Previous vein harvesting from L leg.     Pt/family would like team to know that pt was \"non compliant\" due to him not tolerating statins. States he was tried on numerous statins and was feeling like he was \"89 years old.\"    - CT reviewed with Dr. Frank and Dr. Frank discussed with Dr. Altamirano.  - LIMA to LAD graft is widely patent. No recommendations for redo CABG at this time.   - Cardiology to discuss PCI options with patient.  - CV surgery will sign off. Call with questions      Alka Das PA-C  Cardiothoracic Surgery  Pager: [phone]        "

## 2024-03-01 NOTE — PLAN OF CARE
A&O x 4. VSS. RA. Tele: SR w/BBB 1st Degree AVB. Up independent. Denies CP/SOB. Hep gtt @1300units/hr. Nitroglycerin gtt @ 10mcg/min. K of 3.8, no replacement required. Family at bedside. Plan for cMRI tomorrow. Will continue w/plan of care.

## 2024-03-01 NOTE — PLAN OF CARE
A&Ox4, denies CP or SOB. Tele SR with 1st degree AVB, VSS on RA. PCI with 2 stents to Ramus and 1 to SVG. TR band in place. Independent in room. Possible discharge home tomorrow.

## 2024-03-01 NOTE — CONSULTS
"SPIRITUAL HEALTH SERVICES  SPIRITUAL ASSESSMENT Consult Note  [facility]     REFERRAL SOURCE: Follow up visit    Brief conversation with patient's family and friends in the hallway. Alice expressed gratitude after learning that Sandor was taken to the Cath Lab after a decision was made that a CABG is no longer being considered. She was tearful as she shared that \"prayers have been answered.\" They were able \"to gather around [Sandor] to say prayers\" before he went down for his procedure.     PLAN: Spiritual Health remains available for support.    Carolyn Gtz  Associate      SHS available 24/7 for emergent requests/referrals, either by paging the on-call  or by entering an ASAP/STAT consult in Epic (this will also page the on-call ).     "

## 2024-03-01 NOTE — PROGRESS NOTES
[facility]    Hospitalist Progress Note    Assessment & Plan   Lokesh Shipley Jr. is a 73 year old male with past medical history including coronary artery disease with history of MI and prior CABG, hypertension, hyperlipidemia who presents to [facility] with chest pain, found to have NSTEMI and complex anatomy, transferred to [facility] for CV surgery and cardiology consultation for intervention. Admitted on 2/27/2024.      STEMI status post PTCB 2/27/2024   Coronary artery disease with prior MI and CABG  Ischemic cardiomyopathy   Hypertension  Hyperlipidemia  Medication non-adherence  Borderline diabetes type 2  *Presented with severe chest pain  *EKG with STEMI in aVR with diffuse ST depressions. Troponin 31->452->527.  *Echocardiogram with EF 40-45%, inferolateral, distal lateral and apical hypokinesis  - Cardiology and CV surgery consulted, appreciate input, plan between redo CABG versus complex PCI.  Cardiology will perform PCI 3/1. CV surgery  discussed case and decided that open surgical approach is not  needed at this time. Cardiology will defer cardiac MRI to outpatient for staged approach considerations of non culprit vessels.   - Aspirin, ticagrelor (on hold), metoprolol, losartan to continue.  Dose adjustment as per cardiology.  - NTG gtt and heparin gtt. to continue  -Continue on telemetry, hemoglobin A1c is 6.1 - discussed with  patient about lifestyle modification .  - Statin intolerant per notes, plan for eventual PSK9 inhibitor per cardiology  -discharge plans to be decided after the pci 3/1.    DVT Prophylaxis: heparin drip   Code Status: Full Code     51 MINUTES SPENT BY ME on the date of service doing chart review, history, exam, documentation & further activities per the note. Discussed borderline DM 2 diagnosis, plan of care etc.  Disposition: Expected discharge possibly in 1-2 days after PCI today.  Clinically Significant Risk Factors                        "  # Overweight: Estimated body mass index is 29.52 kg/m  as calculated from the following:    Height as of an earlier encounter on 2/27/24: 1.753 m (5' 9\").    Weight as of this encounter: 90.7 kg (199 lb 14.4 oz)., PRESENT ON ADMISSION            Katie Gaston MD  Text Page   (7am to 6pm)    Interval History   No chest pain, npo for procedure today, discussed with  cardiology , no plan for open procedure this admission.    -Data reviewed today: I reviewed all new labs and imaging results over the last 24 hours.  Physical Exam     Vital Signs with Ranges  Temp:  [98  F (36.7  C)-98.5  F (36.9  C)] 98.4  F (36.9  C)  Pulse:  [63-71] 64  Resp:  [16] 16  BP: ()/(52-83) 98/66  SpO2:  [76 %-100 %] 96 %  I/O last 3 completed shifts:  In: 480 [P.O.:480]  Out: 750 [Urine:750]    Constitutional: Awake, alert, cooperative, no apparent distress  Respiratory: breath sounds equal bilateral.  Cardiovascular: Regular rate and rhythm, normal S1 and S2, and no murmur noted  GI: Normal bowel sounds, soft, non-distended, non-tender  Skin/Integumen: No rashes, no cyanosis, no edema  Neuro : moving all 4 extremities, no focal deficit noted     Medications    heparin 1,450 Units/hr (03/01/24 0958)    - MEDICATION INSTRUCTIONS -      nitroGLYcerin 10 mcg/min (03/01/24 0815)    - MEDICATION INSTRUCTIONS -      STATIN NOT PRESCRIBED        aspirin  325 mg Oral Once    Or    aspirin  243 mg Oral Once    aspirin  81 mg Oral Daily    losartan  50 mg Oral Daily    metoprolol succinate ER  25 mg Oral Daily    sodium chloride (PF)  10 mL Intravenous Once    sodium chloride (PF)  3 mL Intracatheter Q8H    sodium chloride (PF)  3 mL Intracatheter Q8H    ticagrelor  180 mg Oral Once    ticagrelor  90 mg Oral BID       Data   Recent Labs   Lab 03/01/24  0533 02/29/24  1133 02/28/24  0447 02/27/24  2019 02/27/24  1601 02/27/24  1218 02/27/24  0819 02/27/24  0803   WBC  --   --  8.5  --   --   --   --  5.6   HGB  --   --  11.6*  --   --   --  " 13.9 14.0   MCV  --   --  87  --   --   --   --  89   PLT  --   --  272  --   --   --   --  326   NA  --   --  137  --   --   --  140 139   POTASSIUM 3.7 3.8 4.1  --   --   --  4.3 4.0   CHLORIDE  --   --  103  --   --   --  103 103   CO2  --   --  24  --   --   --   --  26   BUN  --   --  10.5  --   --   --  17 15.0   CR 0.75  --  0.74  --   --   --  0.8 0.84   ANIONGAP  --   --  10  --   --   --   --  10   BROOK  --   --  8.7*  --   --   --   --  9.5   GLC  --   --  113* 114* 122*   < > 115* 122*   ALBUMIN  --   --  3.7  --   --   --   --   --    PROTTOTAL  --   --  6.1*  --   --   --   --   --    BILITOTAL  --   --  0.8  --   --   --   --   --    ALKPHOS  --   --  58  --   --   --   --   --    ALT  --   --  29  --   --   --   --   --    AST  --   --  112*  --   --   --   --   --     < > = values in this interval not displayed.     Recent Labs   Lab 02/28/24  0447 02/27/24 2019 02/27/24  1601 02/27/24  1218 02/27/24  0819   * 114* 122* 98 115*       Imaging:

## 2024-03-02 ENCOUNTER — APPOINTMENT (OUTPATIENT)
Dept: OCCUPATIONAL THERAPY | Facility: CLINIC | Age: 74
DRG: 322 | End: 2024-03-02
Attending: INTERNAL MEDICINE
Payer: COMMERCIAL

## 2024-03-02 VITALS
TEMPERATURE: 98.5 F | SYSTOLIC BLOOD PRESSURE: 119 MMHG | DIASTOLIC BLOOD PRESSURE: 73 MMHG | RESPIRATION RATE: 16 BRPM | BODY MASS INDEX: 29.3 KG/M2 | WEIGHT: 198.4 LBS | HEART RATE: 68 BPM | OXYGEN SATURATION: 86 %

## 2024-03-02 LAB
ANION GAP SERPL CALCULATED.3IONS-SCNC: 11 MMOL/L (ref 7–15)
BUN SERPL-MCNC: 11.6 MG/DL (ref 8–23)
CALCIUM SERPL-MCNC: 9 MG/DL (ref 8.8–10.2)
CHLORIDE SERPL-SCNC: 102 MMOL/L (ref 98–107)
CREAT SERPL-MCNC: 0.78 MG/DL (ref 0.67–1.17)
DEPRECATED HCO3 PLAS-SCNC: 23 MMOL/L (ref 22–29)
EGFRCR SERPLBLD CKD-EPI 2021: >90 ML/MIN/1.73M2
ERYTHROCYTE [DISTWIDTH] IN BLOOD BY AUTOMATED COUNT: 11.9 % (ref 10–15)
GLUCOSE SERPL-MCNC: 96 MG/DL (ref 70–99)
HCT VFR BLD AUTO: 36.7 % (ref 40–53)
HGB BLD-MCNC: 12.7 G/DL (ref 13.3–17.7)
MCH RBC QN AUTO: 30.5 PG (ref 26.5–33)
MCHC RBC AUTO-ENTMCNC: 34.6 G/DL (ref 31.5–36.5)
MCV RBC AUTO: 88 FL (ref 78–100)
PLATELET # BLD AUTO: 318 10E3/UL (ref 150–450)
POTASSIUM SERPL-SCNC: 4.3 MMOL/L (ref 3.4–5.3)
RBC # BLD AUTO: 4.17 10E6/UL (ref 4.4–5.9)
SODIUM SERPL-SCNC: 136 MMOL/L (ref 135–145)
WBC # BLD AUTO: 5.9 10E3/UL (ref 4–11)

## 2024-03-02 PROCEDURE — 36415 COLL VENOUS BLD VENIPUNCTURE: CPT | Performed by: INTERNAL MEDICINE

## 2024-03-02 PROCEDURE — 99239 HOSP IP/OBS DSCHRG MGMT >30: CPT | Performed by: HOSPITALIST

## 2024-03-02 PROCEDURE — 250N000013 HC RX MED GY IP 250 OP 250 PS 637: Performed by: INTERNAL MEDICINE

## 2024-03-02 PROCEDURE — 97165 OT EVAL LOW COMPLEX 30 MIN: CPT | Mod: GO

## 2024-03-02 PROCEDURE — 97110 THERAPEUTIC EXERCISES: CPT | Mod: GO

## 2024-03-02 PROCEDURE — 250N000013 HC RX MED GY IP 250 OP 250 PS 637: Performed by: NURSE PRACTITIONER

## 2024-03-02 PROCEDURE — 93010 ELECTROCARDIOGRAM REPORT: CPT | Performed by: INTERNAL MEDICINE

## 2024-03-02 PROCEDURE — 99207 PR NO BILLABLE SERVICE THIS VISIT: CPT | Performed by: HOSPITALIST

## 2024-03-02 PROCEDURE — 80048 BASIC METABOLIC PNL TOTAL CA: CPT | Performed by: INTERNAL MEDICINE

## 2024-03-02 PROCEDURE — 99232 SBSQ HOSP IP/OBS MODERATE 35: CPT | Performed by: INTERNAL MEDICINE

## 2024-03-02 PROCEDURE — 85027 COMPLETE CBC AUTOMATED: CPT | Performed by: INTERNAL MEDICINE

## 2024-03-02 PROCEDURE — 97530 THERAPEUTIC ACTIVITIES: CPT | Mod: GO

## 2024-03-02 PROCEDURE — 93005 ELECTROCARDIOGRAM TRACING: CPT

## 2024-03-02 RX ORDER — METOPROLOL SUCCINATE 25 MG/1
25 TABLET, EXTENDED RELEASE ORAL DAILY
Qty: 30 TABLET | Refills: 0 | Status: SHIPPED | OUTPATIENT
Start: 2024-03-03 | End: 2024-03-08

## 2024-03-02 RX ORDER — LOSARTAN POTASSIUM 50 MG/1
50 TABLET ORAL DAILY
Qty: 90 TABLET | Refills: 3 | Status: SHIPPED | OUTPATIENT
Start: 2024-03-03

## 2024-03-02 RX ADMIN — METOPROLOL SUCCINATE 25 MG: 25 TABLET, EXTENDED RELEASE ORAL at 09:15

## 2024-03-02 RX ADMIN — TICAGRELOR 90 MG: 90 TABLET ORAL at 09:15

## 2024-03-02 RX ADMIN — ASPIRIN 81 MG: 81 TABLET, COATED ORAL at 09:15

## 2024-03-02 RX ADMIN — LOSARTAN POTASSIUM 50 MG: 50 TABLET, FILM COATED ORAL at 09:15

## 2024-03-02 ASSESSMENT — ACTIVITIES OF DAILY LIVING (ADL)
ADLS_ACUITY_SCORE: 23

## 2024-03-02 NOTE — PLAN OF CARE
Goal Outcome Evaluation:       [id number]    A&O x 4. Pt denied pain. VSS, on RA; would de sat when sleeping to mid 80%, placed on 2L overnight. R Radial TR band did leak upon taking air out, following TR band protocol. Site CDI, CMS intact. TR band off @ 0136 covered with band aid. Up w/ Independently in room. Senna given for constipation. Tele SR 1 st deg AVB, BBB. Plan for possible discharge home 3/2. Continue with plan of care.

## 2024-03-02 NOTE — DISCHARGE SUMMARY
"[facility]  Hospitalist Discharge Summary      Date of Admission:  2/27/2024  Date of Discharge:  3/2/2024  Discharging Provider: Amber Ro MD  Discharge Service: Hospitalist Service    Discharge Diagnoses   STEMI    Clinically Significant Risk Factors     # Overweight: Estimated body mass index is 29.3 kg/m  as calculated from the following:    Height as of an earlier encounter on 2/27/24: 1.753 m (5' 9\").    Weight as of this encounter: 90 kg (198 lb 6.4 oz).       Follow-ups Needed After Discharge   Follow-up Appointments     Follow-up and recommended labs and tests       Follow up with primary care provider, Zahira Morales, within 7 days   for hospital follow- up.  No follow up labs or test are needed.  Follow up with cardiology on discharge            Unresulted Labs Ordered in the Past 30 Days of this Admission       No orders found from 1/28/2024 to 2/28/2024.        These results will be followed up by N/A    Discharge Disposition   Discharged to home  Condition at discharge: Stable    Hospital Course   Lokesh Shipley Jr. is a 73 year old male with past medical history including coronary artery disease with history of MI and prior CABG, hypertension, hyperlipidemia who presents to [facility] with chest pain, found to have STEMI and complex anatomy, transferred to [facility] for CV surgery and cardiology consultation for intervention. Admitted on 2/27/2024.      STEMI status post PTCB 2/27/2024   Coronary artery disease with prior MI and CABG  Ischemic cardiomyopathy   Hypertension  Hyperlipidemia  Medication non-adherence  Borderline diabetes type 2  *Presented with severe chest pain  *EKG with STEMI in aVR with diffuse ST depressions. Troponin 31->452->527.  *Echocardiogram with EF 40-45%, inferolateral, distal lateral and apical hypokinesis  - Cardiology and CV surgery consulted, appreciate input, plan between redo CABG versus complex PCI.  Cardiology will perform PCI " 3/1. CV surgery  discussed case and decided that open surgical approach is not  needed at this time. Cardiology will defer cardiac MRI to outpatient for staged approach considerations of non culprit vessels.   - Aspirin, ticagrelor (on hold), metoprolol, losartan to continue.  Dose adjustment as per cardiology.  - NTG gtt and heparin gtt. to continue  -Continue on telemetry, hemoglobin A1c is 6.1 - Previous hospitalist discussed with  patient about lifestyle modification .  - Statin intolerant per notes, plan for eventual PSK9 inhibitor per cardiology  - Patients medications adjusted per cards.  - Patient to follow up with cardiology on discharge.     Consultations This Hospital Stay   CARDIAC REHAB IP CONSULT  CARDIOLOGY IP CONSULT  CARDIOTHORACIC SURGERY IP CONSULT  CARDIOLOGY IP CONSULT  CARDIOVASCULAR SURGERY IP CONSULT  CARDIOLOGY IP CONSULT  CARDIOTHORACIC SURGERY IP CONSULT  PHARMACY IP CONSULT  PHARMACY LIAISON FOR MEDICATION COVERAGE CONSULT  SPIRITUAL HEALTH SERVICES IP CONSULT  SPIRITUAL HEALTH SERVICES IP CONSULT  HOSPITALIST IP CONSULT  NUTRITION SERVICES ADULT IP CONSULT  CARDIAC REHAB IP CONSULT  PHARMACY IP CONSULT  SMOKING CESSATION PROGRAM IP CONSULT  SMOKING CESSATION PROGRAM IP CONSULT    Code Status   Full Code    Time Spent on this Encounter   I, Amber Ro MD, personally saw the patient today and spent greater than 30 minutes discharging this patient.       Amber Ro MD  [facility]  [address] [place] [address], [address]  [facility] [address]  Phone: [phone]  ______________________________________________________________________    Physical Exam   Vital Signs: Temp: 98.5  F (36.9  C) Temp src: Oral BP: 139/69 Pulse: 75   Resp: 16 SpO2: 97 % O2 Device: None (Room air) Oxygen Delivery: 2 LPM  Weight: 198 lbs 6.4 oz  General Appearance: Well appearing for stated age.  Respiratory: CTAB, no rales or ronchi  Cardiovascular: S1, S2 normal, no murmurs  GI: non-tender  on palpation, BS present         Primary Care Physician   Zahira Morales    Discharge Orders      Cardiac Rehab  Referral      Medication Instructions - Anticoagulants    Do NOT stop your aspirin or platelet inhibitor unless directed by your Cardiologist.  These medications help to prevent platelets in your blood from sticking together and forming a clot.  Examples of these medications are:  Ticagrelor (Brilinta), Clopidigrel (Plavix), Prasugrel (Effient)     When to call - Contact the Heart Clinic    You may experience symptoms that require follow-up before your scheduled appointment. Contact the Heart Clinic if you develop: Fever over 100.4o Fahrenheit, that lasts more than one day; Redness, heat, or pus at the puncture site; Change in color or temperature in your hand or arm.     When to call - Reasons to Call 911    If your wrist puncture site starts bleeding after discharge, sit down and apply firm pressure with your thumb against the puncture site and fingers against the back of the wrist for 10 minutes. If the bleeding stops, continue to rest, keeping your wrist still for 2 hours. Notify your doctor as soon as possible.  IF BLEEDING DOES NOT STOP OR THERE IS A LARGER AMOUNT OF BLEEDING OR SPURTING CALL 9-1-1 immediately.DO NOT drive yourself to the hospital.     Precautions - Lifting    DO NOT lift more than 5 pounds with affected arm for 48 hours     Precautions - Household Activities    Avoid excessive bending or movement of your wrist for 72 hours.  Do not subject hand/arm to any forceful movements for 24 hours, such as supporting weight when rising from a chair or bed.     Remove the band-aid on the puncture site after 24 hours and leave open to air. If minor oozing, you may apply a band-aid and remove after 12 hours.     Precautions - Active Sports Activities    DO NOT engage in vigorous exercise using your affected arm for 3 days after discharge.  This includes golf, tennis or swimming.      Precautions - Operating yard equipment or vehicles    Do not operate a chainsaw, lawnmower, motorcycle, or all-terrain vehicle for 48 hours after the procedure.     Precautions - Elective Dental Work    NO elective dental work for 6 weeks after receiving a stent.     Comfort and Pain Management - Bruising after Surgery    Expect mild tingling of hand and tenderness at the wrist puncture site for up to 3 days. You may take Tylenol or a pain medicine recommended by your doctor.     Activity - Cardiac Rehab    You are encouraged to enroll in an Outpatient Cardiac Rehab program after discharge from the hospital.  Our Cardiac Rehab staff may visit briefly with you while you're in the hospital.  If they miss you, someone will contact you after you are home.     Return to Driving    Driving is NOT permitted for 24 hours after surgery     Return to work    You may return to work after 72 hours if you are feeling well and your job does not involve heavy lifting.     Shower / Bathing    You may shower on the day after your procedure.  DO NOT soak of wrist with the puncture site in water for 3 days to prevent infection. DO NOT take a tub bath or wash dishes for 3 days after the procedure     Dressing Removal    Remove the band-aid on the puncture site after 24 hours and leave open to air. If minor oozing, you may apply a band-aid and remove after 12 hours     Reason aspirin not prescribed from this order set    Already on aspirin     Reason Lipid Lowering Medications not prescribed from this order set    Intolerant of statin.     Reason for your hospital stay    You were treated for a heart attack     Follow-up and recommended labs and tests     Follow up with primary care provider, Zahira Morales, within 7 days for hospital follow- up.  No follow up labs or test are needed.  Follow up with cardiology on discharge     Activity    Your activity upon discharge: activity as tolerated     Diet    Follow this diet upon  discharge: Orders Placed This Encounter      Low Saturated Fat Na <2400 mg       Significant Results and Procedures   Most Recent 3 CBC's:  Recent Labs   Lab Test 03/02/24  0536 02/28/24  0447 02/27/24  0819 02/27/24  0803   WBC 5.9 8.5  --  5.6   HGB 12.7* 11.6* 13.9 14.0   MCV 88 87  --  89    272  --  326     Most Recent 3 BMP's:  Recent Labs   Lab Test 03/02/24  0536 03/01/24  0533 02/29/24  1133 02/28/24  0447 02/27/24 2019 02/27/24  1218 02/27/24  0819 02/27/24  0803     --   --  137  --   --  140 139   POTASSIUM 4.3 3.7 3.8 4.1  --   --  4.3 4.0   CHLORIDE 102  --   --  103  --   --  103 103   CO2 23  --   --  24  --   --   --  26   BUN 11.6  --   --  10.5  --   --  17 15.0   CR 0.78 0.75  --  0.74  --   --  0.8 0.84   ANIONGAP 11  --   --  10  --   --   --  10   BROOK 9.0  --   --  8.7*  --   --   --  9.5   GLC 96  --   --  113* 114*   < > 115* 122*    < > = values in this interval not displayed.   ,   Results for orders placed or performed during the hospital encounter of 02/27/24   CT Chest w/o Contrast    Narrative    CT CHEST WITHOUT CONTRAST  2/29/2024 9:29 AM     HISTORY: Preop CABG, coronary artery disease.    TECHNIQUE: CT scan of the chest was performed without IV contrast.  Multiplanar reformats were obtained. Dose reduction techniques were  used.  CONTRAST: None.    COMPARISON: 9/19/2021    FINDINGS:   LUNGS AND PLEURA: Stable calcified granulomas. Lungs are otherwise  clear.    MEDIASTINUM/AXILLAE: Postop CABG. Normal caliber thoracic aorta. No  ascending aortic calcification.    UPPER ABDOMEN: Negative.      Impression    IMPRESSION:  1.  Postop CABG.  2.  No aortic aneurysm or significant ascending aortic calcification.    BERNARD LEACH MD         SYSTEM ID:  [id number]   US Carotid Bilateral    Narrative    EXAM: US CAROTID BILATERAL  LOCATION: [facility]  DATE: 2/28/2024    INDICATION: Preop CABG. Coronary artery disease.  COMPARISON: None.  TECHNIQUE:  Duplex exam performed utilizing 2D gray-scale imaging, Doppler interrogation with color-flow and spectral waveform analysis. The percent diameter stenosis is determined using NASCET criteria and Society of Radiologists in Ultrasound Consensus   Criteria.    FINDINGS:    RIGHT: Mild plaque at the bifurcation. The peak systolic velocity in the ICA is less than 125 cm/sec, consistent with less than 50% stenosis. Normal velocities in the ECA. Antegrade flow within the vertebral artery.     LEFT: No significant plaque noted in the carotid bifurcation. Velocities are slightly elevated with the peak systolic velocity in the internal carotid artery 144 cm/s, however, likely due to vessel tortuosity given lack of atherosclerotic plaque. Normal   velocities in the ECA. Antegrade flow within the vertebral artery.    VELOCITY CHART:  CCA   Right: 209 cm/s   Left: 172 cm/s  ICA   Right: 118 cm/s   Left: 144 cm/s  ECA   Right: 135 cm/s   Left: 167 cm/s  ICA/CCA PSV Ratio   Right: 1.09   Left: 1.63      Impression    IMPRESSION:  1.  Mild plaque formation, velocities consistent with less than 50% stenosis in the right internal carotid artery.  2.  Mild plaque formation. Elevated velocities in the left internal carotid artery, however this is likely secondary to vessel tortuosity. Less than 50% stenosis of the left internal carotid artery.  3.  Flow within the vertebral arteries is antegrade.   US Lower Extremity Venous Mapping Bilateral    Narrative    EXAM: US LOWER EXTREMITY VENOUS MAPPING BILATERAL  LOCATION: [facility]  DATE: 2/28/2024    INDICATION: Preop CABG  COMPARISON: None.  TECHNIQUE: Ultrasound examination of the lower extremity veins was performed, including gray-scale and compression imaging.     LOWER  EXTREMITY FINDINGS:       VENOUS DIAMETERS  RIGHT GREAT SAPHENOUS VEIN  Upper Thigh: 7.6 - 4.0 mm  Mid Thigh: 4.2 - 3.0 mm  Lower Thigh: 3.4 - 3.3 mm  Knee: 3.6 - 2.6 mm  Upper Calf: 2.6 -  2.5 mm  Mid Calf: 2.5 mm  Lower Calf: 2.5 mm  Ankle: 3.0 mm    LEFT GREAT SAPHENOUS VEIN  Previously harvested    RIGHT SMALL SAPHENOUS VEIN  Not assessed    LEFT SMALL SAPHENOUS VEIN  Not assessed.      Impression    IMPRESSION:  1. Right leg mapping as above.   Radiologist Consult For Cardiology    Narrative    RADIOLOGIST CONSULT FOR CARDIOLOGY 2024 1:55 PM    HISTORY: Coronary artery disease.    COMPARISON: Chest CT 2024.      Impression    IMPRESSION: No significant extracardiac findings.     BERNARD LEACH MD         SYSTEM ID:  [id number]   Echocardiogram Complete     Value    LVEF  60-65%    Narrative    [id number]  [id number]  [id number]  [id number]^AC^ELIEL^MARY ALICE     [facility]  Echocardiography Laboratory  [address] [place] [address]     Name: JR. KINDRA ALFORD JR.  MRN: [id number]  : 1950  Study Date: 2024 10:59 AM  Age: 73 yrs  Gender: Male  Patient Location: [facility]  Reason For Study: CAD  Ordering Physician: ELIEL SHEN  Referring Physician: Zahira Morales  Performed By: Radha Stanford     BSA: 2.1 m2  Height: 69 in  Weight: 201 lb  HR: 66  BP: 109/70 mmHg  ______________________________________________________________________________  Procedure  Complete Portable Echo Adult. Optison (NDC #[id number]) given intravenously.  ______________________________________________________________________________  Interpretation Summary     The visual ejection fraction is 60-65%. No regional wall motion abnormalities  noted.  The right ventricle is normal in size and function.  There is no pericardial effusion.  There is mild (1+) aortic regurgitation.  The aortic Sinus(es) of Valsalva are borderline dilated. Measure 4 cms.     EF is better than prior study. Although image quality was very poor on the  joanie study on 24. EF is on this study is normal.  ______________________________________________________________________________  Left  Ventricle  The left ventricle is normal in size. There is mild concentric left  ventricular hypertrophy. Left ventricular systolic function is normal. The  visual ejection fraction is 60-65%. No regional wall motion abnormalities  noted.     Right Ventricle  The right ventricle is normal in size and function.     Atria  The left atrium is mildly dilated. Right atrial size is normal.     Mitral Valve  There is mild mitral annular calcification. Thickened mitral valve posterior  leaflet. There is trace to mild mitral regurgitation. There is no mitral valve  stenosis.     Tricuspid Valve  The tricuspid valve is not well visualized, but is grossly normal. There is  physiologic tricuspid regurgitation. Right ventricular systolic pressure could  not be approximated due to inadequate tricuspid regurgitation.     Aortic Valve  There is mild (1+) aortic regurgitation. The peak AoV pressure gradient is 8.0  mmHg. The mean AoV pressure gradient is 5.0 mmHg. No hemodynamically  significant valvular aortic stenosis.     Pulmonic Valve  The pulmonic valve is not well visualized.     Vessels  The aortic Sinus(es) of Valsalva are borderline dilated. Measure 4 cms.     Pericardium  There is no pericardial effusion.     ______________________________________________________________________________  MMode/2D Measurements & Calculations  IVSd: 1.3 cm  LVIDd: 5.1 cm  LVIDs: 3.2 cm  LVPWd: 1.2 cm  FS: 37.7 %  LV mass(C)d: 255.5 grams  LV mass(C)dI: 123.4 grams/m2     Ao root diam: 4.0 cm  LA dimension: 4.2 cm  asc Aorta Diam: 3.9 cm  LA/Ao: 1.0  LVOT diam: 2.3 cm  LVOT area: 4.1 cm2  Ao root diam index Ht(cm/m): 2.3  Ao root diam index BSA (cm/m2): 2.0  Asc Ao diam index BSA (cm/m2): 1.9  Asc Ao diam index Ht(cm/m): 2.2  LA Volume (BP): 73.9 ml  LA Volume Index (BP): 35.7 ml/m2  RWT: 0.47     TAPSE: 1.9 cm     Doppler Measurements & Calculations  MV E max annie: 82.8 cm/sec  MV A max annie: 76.5 cm/sec  MV E/A: 1.1  MV dec time: 0.27 sec  Ao  V2 max: 140.0 cm/sec  Ao max P.0 mmHg  Ao V2 mean: 100.0 cm/sec  Ao mean P.0 mmHg  Ao V2 VTI: 29.8 cm  AILEEN(I,D): 3.0 cm2  AILEEN(V,D): 2.8 cm2  LV V1 max PG: 3.5 mmHg  LV V1 max: 93.8 cm/sec  LV V1 VTI: 21.6 cm  SV(LVOT): 89.6 ml  SI(LVOT): 43.3 ml/m2  PA acc time: 0.12 sec  AV Norman Ratio (DI): 0.67  AILEEN Index (cm2/m2): 1.5  E/E' av.1  Lateral E/e': 5.2  Medial E/e': 12.9  RV S Norman: 12.9 cm/sec     ______________________________________________________________________________  Report approved by: Dary Claros 2024 03:10 PM         Cardiac Catheterization    Narrative      Dist LM lesion is 20% stenosed.    Prox RCA lesion is 100% stenosed.    Origin to Prox Graft lesion is 35% stenosed.    2nd Mrg lesion is 100% stenosed.    RPDA lesion is 90% stenosed.    Mid Cx lesion is 100% stenosed.    Prox Graft lesion is 80% stenosed.    Ramus-2 lesion is 75% stenosed.    Prox LAD lesion is 99% stenosed.    Ramus-1 lesion is 75% stenosed.    Prox Cx lesion is 99% stenosed.    IVUS was performed on the lesion.    Ultrasound (IVUS) was performed.    Staged angioplasty with stent of the ramus intermedius with a 3.0 x 38 and   a 4.0 x 38mm Synergy CATALINA.  Staged angioplasty with stent of the ostial vein graft to diagonal with a   3. 0 x 20mm Synergy CATALINA.   and of the midLCx and severely diseased OM1 were not crossable with   antegrade wire escalation and a microcatheter: recommend medical   management.      CTA  ANGIOGRAM CORONARY ARTERY    Narrative    Procedure: CTA ANGIOGRAM CORONARY ARTERY     Examination Date: 2024 1:55 PM     Indication:  Assess the LIMA graft.     Clinical Information: multivessel CAD, undergoing elvaluation for redo  CABG     Ordering Provider: Va Bowen NP    Overall quality of the study: Technically challenging.     PROCEDURE: ECG gated multi-slice computed tomography of the heart with  and without intravenous contrast  ( Isovue 370, 100 cc) was  performed  on a Siemens Dual  Source Flash scanner without incident. CTA was  performed in the min dose spiral mode at a heart rate of 64 bpm with  120 kVp. Images were reconstructed and analyzed on a Painting With A Twist  workstation. Scan protocol was optimized to minimize radiation  exposure. The total radiation exposure was calculated to be 622 DLP,  and 8.7 mSv.      Impression    IMPRESSION:    1. Severe native three-vessel disease. Status post bypass grafting.  2. Widely Patent LIMA to the LAD including origin of the LIMA graft as  well as the proximal left subclavian artery. The distal LAD beyond the  anastomosis is not well-visualized on this study.  3. Severe stenosis in the proximal portion of the vein graft to the  diagonal branch.  4. Mild diffuse disease in the vein graft to the right coronary artery  although anastomotic area is not well visualized. The distal right  coronary artery and its branches are not well visualized and cannot be  commented upon due to severe calcification.  5.  Occluded proximal right coronary artery  6. Ostial circumflex with likely severe stenosis, rest of the vessel  not well visualized.  7. The ramus is  medium caliber vessel, likely with severe stenosis in  the proximal portion. Rest of the vessel is moderately calcified, not  well visualized.    FINDINGS:    DOMINANCE: Right dominant system.     LEFT MAIN: The left main arises normally from the left coronary cusp  and is severely calcified distally. Stenosis in this region cannot be  evaluated..    LEFT ANTERIOR DESCENDING: The proximal LAD severely calcified.. There  appears to be near occlusion in the proximal LAD before the origin of  the diagonal branch. The mid to distal LAD is filled by the LIMA  graft. The uterus the anastomosis, distal LAD is not well-visualized  on this study. The diagonal branch is filled by the vein graft to the  diagonal branch.    LEFT CIRCUMFLEX: The circumflex coronary artery is a small caliber  nondominant vessel, not well  visualized. There is likely severe  stenosis at the ostium of the circumflex. The ramus division is small  to medium caliber vessel with severe stenosis in the proximal portion.    RIGHT CORONARY ARTERY: The right coronary artery is severely calcified  and occluded. The distal right coronary arteries 3 by the vein graft  to the RCA. The anastomosis is not well visualized. Distal right  coronary artery is severely calcified and stenosis in this region  cannot be evaluated. The distal right coronary artery branches are not  well visualized.    LIMA graft, the LIMA graft appears widely patent. That appears to be  no stenosis in the proximal left subclavian artery.      ADDITIONAL FINDINGS:   The proximal ascending aorta is normal in size.   Normal pulmonary venous anatomy with all pulmonary veins draining into  the left atrium.    Normal pericardial thickness. There is no pericardial effusion.  The proximal pulmonary arteries are not well opacified.  Please review Radiology report for incidental noncardiac findings that  will follow separately.    REFERENCE STENOSIS GRADING  Normal: widely patent without any detectable stenosis   Minimal : <25% stenosis  Mild: 25-49% stenosis  Moderate: 50-69% stenosis  Severe: >70% stenosis  Occluded    Plaque composition: calcified, noncalcified or mixed plaque      ERICKA GUZMAN MD         SYSTEM ID:  [id number]       Discharge Medications   Current Discharge Medication List        START taking these medications    Details   losartan (COZAAR) 50 MG tablet Take 1 tablet (50 mg) by mouth daily  Qty: 90 tablet, Refills: 3    Associated Diagnoses: ST elevation myocardial infarction (STEMI) involving other coronary artery (H)      metoprolol succinate ER (TOPROL XL) 25 MG 24 hr tablet Take 1 tablet (25 mg) by mouth daily  Qty: 30 tablet, Refills: 0    Associated Diagnoses: ST elevation myocardial infarction (STEMI) involving other coronary artery (H)      !! ticagrelor (BRILINTA) 90 MG  tablet Take 1 tablet (90 mg) by mouth 2 times daily Dose to start tomorrow morning.  Qty: 180 tablet, Refills: 3    Associated Diagnoses: S/P drug eluting coronary stent placement       !! - Potential duplicate medications found. Please discuss with provider.        CONTINUE these medications which have NOT CHANGED    Details   acetaminophen (TYLENOL) 325 MG tablet Take 2 tablets (650 mg) by mouth every 4 hours as needed for mild pain or headaches    Associated Diagnoses: ST elevation myocardial infarction involving left main coronary artery (H)      aspirin 81 MG EC tablet Take 1 tablet (81 mg) by mouth daily Start tomorrow.  Qty: 30 tablet, Refills: 3    Associated Diagnoses: ST elevation myocardial infarction involving left main coronary artery (H)      nitroGLYcerin (NITROSTAT) 0.4 MG sublingual tablet For chest pain place 1 tablet under the tongue every 5 minutes for 3 doses. If symptoms persist 5 minutes after 1st dose call 911.    Associated Diagnoses: ST elevation myocardial infarction involving left main coronary artery (H)      !! ticagrelor (BRILINTA) 90 MG tablet Take 1 tablet (90 mg) by mouth every 12 hours    Associated Diagnoses: ST elevation myocardial infarction involving left main coronary artery (H)       !! - Potential duplicate medications found. Please discuss with provider.        STOP taking these medications       metoprolol tartrate (LOPRESSOR) 50 MG tablet Comments:   Reason for Stopping:         olmesartan (BENICAR) 20 MG tablet Comments:   Reason for Stopping:             Allergies   Allergies   Allergen Reactions    Pcn [Penicillins] Shortness Of Breath    Sulfa Antibiotics Shortness Of Breath

## 2024-03-02 NOTE — PROGRESS NOTES
EP Progress Note          Assessment and Plan:     74 yo M admitted on 2/27/24 with STEMI s/p PTCB to ramus.  History of coronary artery disease s/p CABG x 3 in 2/2009 with LIMA-LAD, SVG to 1st diagonal and SVG to distal RCA, and ischemic cardiomyopathy with LVEF 40-45%.  Medication noncompliance - has been off statin due to intolerance and not stopped aspirin many years ago, additional lost to cardiology follow up last visit in 2013  Evaluated by CV surgery, felt more appropriate for PCI.  Patient was brought back to the cath lab yesterday, 3/1/24, and had CATALINA of the ramus and ostial vein graft to diagonal.   of the mid LCx and severely disease and not crossable.    - Continue aspirin 81 mg daily and ticagrelor 90 mg BID  - Continue losartan 50 mg daily and metoprolol 25 mg daily  - Plan on starting PCSK-9 inhibitor as outpatient      Alexys Toney MD                Interval History:     Doing well.  No chest pain or shortness of breath.  Eager to go home.   Patient's wife and daughter at bedside.         Medications:       Current Facility-Administered Medications Ordered in Epic   Medication Dose Route Frequency Last Rate Last Admin    acetaminophen (TYLENOL) tablet 650 mg  650 mg Oral Q4H PRN   650 mg at 02/29/24 0037    Or    acetaminophen (TYLENOL) Suppository 650 mg  650 mg Rectal Q4H PRN        aspirin EC tablet 81 mg  81 mg Oral Daily   81 mg at 03/02/24 0915    benzocaine-menthol (CHLORASEPTIC) 6-10 MG lozenge 1 lozenge  1 lozenge Buccal Q1H PRN        Continuing antiplatelet from home medication list OR antiplatelet order already placed during this visit   Does not apply DOES NOT GO TO MAR        Continuing beta blocker from home medication list OR beta blocker order already placed during this visit   Does not apply DOES NOT GO TO MAR        fentaNYL (PF) (SUBLIMAZE) injection 25 mcg  25 mcg Intravenous Q15 Min PRN        HOLD: Metformin and metformin containing medications on day of procedure and 48 hours  after IV contrast given for patients with acute kidney injury or severe chronic kidney disease (stage IV or stage V; i.e., eGFR less than 30)   Does not apply HOLD        hydrALAZINE (APRESOLINE) injection 10 mg  10 mg Intravenous Q4H PRN        hydrALAZINE (APRESOLINE) tablet 10 mg  10 mg Oral Q4H PRN        Or    hydrALAZINE (APRESOLINE) injection 10 mg  10 mg Intravenous Q4H PRN        lidocaine (LMX4) cream   Topical Q1H PRN        lidocaine 1 % 0.1-1 mL  0.1-1 mL Other Q1H PRN        losartan (COZAAR) tablet 50 mg  50 mg Oral Daily   50 mg at 03/02/24 0915    medication instruction   Does not apply Continuous PRN        melatonin tablet 1 mg  1 mg Oral At Bedtime PRN        metoprolol (LOPRESSOR) injection 5 mg  5 mg Intravenous Q15 Min PRN        metoprolol succinate ER (TOPROL XL) 24 hr tablet 25 mg  25 mg Oral Daily   25 mg at 03/02/24 0915    midazolam (VERSED) injection 0.5 mg  0.5 mg Intravenous Q5 Min PRN        naloxone (NARCAN) injection 0.2 mg  0.2 mg Intravenous Q2 Min PRN        Or    naloxone (NARCAN) injection 0.4 mg  0.4 mg Intravenous Q2 Min PRN        Or    naloxone (NARCAN) injection 0.2 mg  0.2 mg Intramuscular Q2 Min PRN        Or    naloxone (NARCAN) injection 0.4 mg  0.4 mg Intramuscular Q2 Min PRN        nitroGLYcerin (NITROSTAT) sublingual tablet 0.4 mg  0.4 mg Sublingual Q5 Min PRN        ondansetron (ZOFRAN ODT) ODT tab 4 mg  4 mg Oral Q6H PRN        Or    ondansetron (ZOFRAN) injection 4 mg  4 mg Intravenous Q6H PRN        Percutaneous Coronary Intervention orders placed (this is information for BPA alerting)   Does not apply DOES NOT GO TO MAR        polyethylene glycol (MIRALAX) Packet 17 g  17 g Oral BID PRN   17 g at 02/27/24 2238    prochlorperazine (COMPAZINE) injection 5 mg  5 mg Intravenous Q6H PRN        Or    prochlorperazine (COMPAZINE) tablet 5 mg  5 mg Oral Q6H PRN        Or    prochlorperazine (COMPAZINE) suppository 12.5 mg  12.5 mg Rectal Q12H PRN        Reason statin  not prescribed   Other DOES NOT GO TO MAR        Reason statin not prescribed   Other DOES NOT GO TO MAR        senna-docusate (SENOKOT-S/PERICOLACE) 8.6-50 MG per tablet 1 tablet  1 tablet Oral BID PRN   1 tablet at 24 2118    Or    senna-docusate (SENOKOT-S/PERICOLACE) 8.6-50 MG per tablet 2 tablet  2 tablet Oral BID PRN        sodium chloride (PF) 0.9% PF flush 3 mL  3 mL Intracatheter Q8H   3 mL at 24 0918    sodium chloride (PF) 0.9% PF flush 3 mL  3 mL Intracatheter q1 min prn        ticagrelor (BRILINTA) tablet 90 mg  90 mg Oral BID   90 mg at 24 0915     Current Outpatient Medications Ordered in Epic   Medication    [START ON 3/3/2024] losartan (COZAAR) 50 MG tablet    [START ON 3/3/2024] metoprolol succinate ER (TOPROL XL) 25 MG 24 hr tablet    ticagrelor (BRILINTA) 90 MG tablet             Review of Systems:   The Review of Systems is negative other than noted in the HPI             Physical Exam:   /69   Pulse 75   Temp 98.5  F (36.9  C) (Oral)   Resp 16   Wt 90 kg (198 lb 6.4 oz)   SpO2 97%   BMI 29.30 kg/m        Vital Sign Ranges  Temperature Temp  Av.9  F (36.6  C)  Min: 97.4  F (36.3  C)  Max: 98.5  F (36.9  C)   Blood pressure Systolic (24hrs), Av , Min:101 , Max:139        Diastolic (24hrs), Av, Min:51, Max:97      Pulse Pulse  Av.9  Min: 59  Max: 75   Respirations Resp  Av.6  Min: 11  Max: 20   Pulse oximetry SpO2  Av.6 %  Min: 87 %  Max: 99 %         Intake/Output Summary (Last 24 hours) at 3/2/2024 1301  Last data filed at 3/2/2024 0900  Gross per 24 hour   Intake 740 ml   Output 500 ml   Net 240 ml       Constitutional: Well nourished and in no apparent distress.  Respiratory: Breathing non-labored.   Cardiovascular:  Regular rate and rhythm  Neurologic: Alert, awake, and oriented to person, place and time.  Psychiatric: Affect appropriate.               Data:     Last Comprehensive Metabolic Panel:  Lab Results   Component Value Date      03/02/2024    POTASSIUM 4.3 03/02/2024    CHLORIDE 102 03/02/2024    CO2 23 03/02/2024    ANIONGAP 11 03/02/2024    GLC 96 03/02/2024    BUN 11.6 03/02/2024    CR 0.78 03/02/2024    GFRESTIMATED >90 03/02/2024    BROOK 9.0 03/02/2024       CBC RESULTS:   Recent Labs   Lab Test 03/02/24  0536   WBC 5.9   RBC 4.17*   HGB 12.7*   HCT 36.7*   MCV 88   MCH 30.5   MCHC 34.6   RDW 11.9

## 2024-03-02 NOTE — PLAN OF CARE
Denies CP, ambulated with cardiac rehab. Tele remains SR with 1st degree AVB. Site WDL, CMS intact.    VSS, not in pain at time of discharge. Pt states all belongings and paperwork are accounted for. Medications have been filled at in house pharmacy and are with pt at time of discharge, losartan sent to outside pharmacy for pt . Discharge education complete including medications, follow up appointments, site care and all instructions. Pt verbalized understanding. All questions answered. Family here to drive pt home.

## 2024-03-02 NOTE — DISCHARGE INSTRUCTIONS
Cardiac Angioplasty/Stent Discharge Instructions - Radial    After you go home:    Drink extra fluids for 2 days.  You may resume your normal diet.  No smoking         Care of Wrist Puncture Site:    For the first 24 hrs - check the puncture site every 1-2 hours while awake.  It is normal to have soreness at the puncture site and mild tingling in your hand for up to 3 days.  Remove the bandaid after 24 hours. If there is minor oozing, apply another bandaid and remove it after 12 hours.  You may shower today.  Do NOT take a bath, or use a hot tub or pool for at least 3 days. Do NOT scrub the site. Do not use lotion or powder near the puncture site.           Activity:          For 2 days:   do not use your hand or arm to support your weight (such as rising from a chair)   do not bend your wrist (such as lifting a garage door).  do not lift more than 5 pounds or exercise your arm (such as tennis, golf or bowling).  Do NOT do any heavy activity such as exercise, lifting, or straining.     Bleeding:    If you start bleeding from the site in your wrist, sit down and press firmly on/above the site for 10 minutes.   Once bleeding stops, keep arm still for 2 hours.   Call [facility] as soon as you can.       Call 911 right away if you have heavy bleeding or bleeding that does not stop.      Medicines:    If you are taking an antiplatelet medication such as Plavix, Brilinta or Effient, do not stop taking it until you talk to your cardiologist.      If you are on Metformin (Glucophage), do not restart it until you have blood tests (within 2 to 3 days after discharge).  After you have your blood drawn, you may restart the Metformin.   Take your medications, including blood thinners, unless your provider tells you not to.    If you take Coumadin (Warfarin), have your INR checked by your provider in  3-5 days. Call your clinic to schedule this.  If you have stopped any medicines, check with your provider about when to restart  them.    Follow Up Appointments:    Follow up with [facility] Nurse Practitioner at [facility] of patient preference in 7-10 days.  Cardiac Rehab will contact you for follow up care.    Call the clinic if:    You have a large or growing hard lump around the site.  The site is red, swollen, hot or tender.  Blood or fluid is draining from the site.  You have chills or a fever greater than 101 F (38 C).  Your arm feels numb, cool or changes color.  You have hives, a rash or unusual itching.  Any questions or concerns.    Other Instructions:    If you received a stent - carry your stent card with you at all times.      [facility] [place]:    [phone] [facility] (7 days a week)

## 2024-03-02 NOTE — PROGRESS NOTES
[facility]    Hospitalist Progress Note    Assessment & Plan   Lokesh Shipley Jr. is a 73 year old male with past medical history including coronary artery disease with history of MI and prior CABG, hypertension, hyperlipidemia who presents to [facility] with chest pain, found to have NSTEMI and complex anatomy, transferred to [facility] for CV surgery and cardiology consultation for intervention. Admitted on 2/27/2024.      STEMI status post PTCB 2/27/2024   Coronary artery disease with prior MI and CABG  Ischemic cardiomyopathy   Hypertension  Hyperlipidemia  Medication non-adherence  Borderline diabetes type 2  *Presented with severe chest pain  *EKG with STEMI in aVR with diffuse ST depressions. Troponin 31->452->527.  *Echocardiogram with EF 40-45%, inferolateral, distal lateral and apical hypokinesis  - Cardiology and CV surgery consulted, appreciate input, plan between redo CABG versus complex PCI.  Cardiology will perform PCI 3/1. CV surgery  discussed case and decided that open surgical approach is not  needed at this time. Cardiology will defer cardiac MRI to outpatient for staged approach considerations of non culprit vessels.   - Aspirin, ticagrelor (on hold), metoprolol, losartan to continue.  Dose adjustment as per cardiology.  - NTG gtt and heparin gtt. While hospitalized, discontinued on discharge  -Continue on telemetry, hemoglobin A1c is 6.1 - discussed with  patient about lifestyle modification .  - Statin intolerant per notes, plan for eventual PSK9 inhibitor per cardiology  - patient discharged with cardiology follow-up.    DVT Prophylaxis: heparin drip   Code Status: Full Code     51 MINUTES SPENT BY ME on the date of service doing chart review, history, exam, documentation & further activities per the note. Discussed borderline DM 2 diagnosis, plan of care etc.  Disposition: Expected discharge possibly in 1-2 days after PCI today.  Clinically Significant Risk  "Factors                         # Overweight: Estimated body mass index is 29.3 kg/m  as calculated from the following:    Height as of an earlier encounter on 2/27/24: 1.753 m (5' 9\").    Weight as of this encounter: 90 kg (198 lb 6.4 oz)., PRESENT ON ADMISSION            Amber Ro MD  Text Page   (7am to 6pm)    Interval History   No chest pain, npo for procedure today, discussed with  cardiology , no plan for open procedure this admission.    -Data reviewed today: I reviewed all new labs and imaging results over the last 24 hours.  Physical Exam     Vital Signs with Ranges  Temp:  [97.4  F (36.3  C)-98.5  F (36.9  C)] 98.5  F (36.9  C)  Pulse:  [59-65] 64  Resp:  [11-20] 16  BP: (106-131)/(51-97) 114/67  SpO2:  [87 %-99 %] 96 %  I/O last 3 completed shifts:  In: 800 [P.O.:800]  Out: 800 [Urine:800]    Constitutional: Awake, alert, cooperative, no apparent distress  Respiratory: breath sounds equal bilateral.  Cardiovascular: Regular rate and rhythm, normal S1 and S2, and no murmur noted  GI: Normal bowel sounds, soft, non-distended, non-tender  Skin/Integumen: No rashes, no cyanosis, no edema  Neuro : moving all 4 extremities, no focal deficit noted     Medications    - MEDICATION INSTRUCTIONS -      - MEDICATION INSTRUCTIONS -      - MEDICATION INSTRUCTIONS -      nitroGLYcerin Stopped (03/01/24 1340)    Percutaneous Coronary Intervention orders placed (this is information for BPA alerting)      STATIN NOT PRESCRIBED      STATIN NOT PRESCRIBED        aspirin  81 mg Oral Daily    losartan  50 mg Oral Daily    metoprolol succinate ER  25 mg Oral Daily    sodium chloride (PF)  3 mL Intracatheter Q8H    ticagrelor  90 mg Oral BID       Data   Recent Labs   Lab 03/02/24  0536 03/01/24  0533 02/29/24  1133 02/28/24  0447 02/27/24  2019 02/27/24  1218 02/27/24  0819 02/27/24  0803   WBC 5.9  --   --  8.5  --   --   --  5.6   HGB 12.7*  --   --  11.6*  --   --  13.9 14.0   MCV 88  --   --  87  --   --   --  89   PLT " 318  --   --  272  --   --   --  326     --   --  137  --   --  140 139   POTASSIUM 4.3 3.7 3.8 4.1  --   --  4.3 4.0   CHLORIDE 102  --   --  103  --   --  103 103   CO2 23  --   --  24  --   --   --  26   BUN 11.6  --   --  10.5  --   --  17 15.0   CR 0.78 0.75  --  0.74  --   --  0.8 0.84   ANIONGAP 11  --   --  10  --   --   --  10   BROOK 9.0  --   --  8.7*  --   --   --  9.5   GLC 96  --   --  113* 114*   < > 115* 122*   ALBUMIN  --   --   --  3.7  --   --   --   --    PROTTOTAL  --   --   --  6.1*  --   --   --   --    BILITOTAL  --   --   --  0.8  --   --   --   --    ALKPHOS  --   --   --  58  --   --   --   --    ALT  --   --   --  29  --   --   --   --    AST  --   --   --  112*  --   --   --   --     < > = values in this interval not displayed.     Recent Labs   Lab 03/02/24  0536 02/28/24  0447 02/27/24 2019 02/27/24  1601 02/27/24  1218   GLC 96 113* 114* 122* 98       Imaging:

## 2024-03-02 NOTE — PROGRESS NOTES
03/02/24 0816   Appointment Info   Signing Clinician's Name / Credentials (OT) Kalie Ramon, MS, OTR/L   Living Environment   People in Home spouse   Current Living Arrangements house   Home Accessibility stairs within home   Living Environment Comments Split level with one railing. Reports spouse is in reasonable health and can assist at discharge   Self-Care   Usual Activity Tolerance good   Current Activity Tolerance moderate   Equipment Currently Used at Home   (BP Cuff)   Fall history within last six months no   Activity/Exercise/Self-Care Comment Pt reports at baseline he is independent in self-cares without gait aid. Active. 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   General Information   Onset of Illness/Injury or Date of Surgery 02/27/24   Referring Physician Steff Miranda MD   Patient/Family Therapy Goal Statement (OT) Return home   Additional Occupational Profile Info/Pertinent History of Current Problem Per EMR- Patient is a 73 year old male with a history of s/p CABG x 3 (LIMA to LAD, SVG to diag, SVG to RCA) in 2009, hypertension, hyperlipidemia, medication compliance who presented with STEMI and chest pain who was transferred to [place] by EMS. Patient underwent coronary angiogram which showed multivessel coronary artery disease with likely culprit lesion being a large ramus. Echo cardiogram done with EF of 40-45%. Per report valves not visualized well. Previous vein harvesting from L leg. s/p PCI   Existing Precautions/Restrictions cardiac  (R radial site)   Heart Disease Risk Factors Dislipidemia;Family history;Medical history;Age  (Pt reports possible pre-diabetes)   Cognitive Status Examination   Affect/Mental Status (Cognitive) WFL   Follows Commands WFL   Visual Perception   Visual Impairment/Limitations corrective lenses for reading   Sensory   Sensory Comments Pt denies BUE/BLE numbness or tingling   Pain Assessment   Patient Currently in Pain No   Transfers   Transfer  Comments Pt independent in transfers, ambulation in room and bathroom   Activities of Daily Living   BADL Assessment/Intervention no deficits identified   Clinical Impression   Criteria for Skilled Therapeutic Interventions Met (OT) Yes, treatment indicated   OT Diagnosis Decline function   OT Problem List-Impairments impacting ADL activity tolerance impaired  (cardiac precautions)   Assessment of Occupational Performance 3-5 Performance Deficits   Identified Performance Deficits functional mobility, strenuous IADLs, health management   Planned Therapy Interventions (OT) IADL retraining;home program guidelines;progressive activity/exercise;risk factor education   Clinical Decision Making Complexity (OT) problem focused assessment/low complexity   Risk & Benefits of therapy have been explained evaluation/treatment results reviewed;care plan/treatment goals reviewed;current/potential barriers reviewed;risks/benefits reviewed;participants voiced agreement with care plan;participants included;patient   OT Total Evaluation Time   OT Eval, Low Complexity Minutes (79251) 5   OT Goals   Therapy Frequency (OT) 2 times/day   OT Predicted Duration/Target Date for Goal Attainment 03/04/24   OT Goals Cardiac Phase 1   OT: Understanding of cardiac education to maximize quality of life, condition management, and health outcomes Patient;Verbalize;Goal Met   OT: Perform aerobic activity with stable cardiovascular response 10 minutes;treadmill   OT: Functional/aerobic ambulation tolerance with stable cardiovascular response in order to return to home and community environment Greater than 300 feet;Modified independent;Goal Met   OT: Navigation of stairs simulating home set up with stable cardiovascular response in order to return to home and community environment 10 stairs;Supervision/SBA  (1 railing)   Interventions   Interventions Quick Adds Cardiac Rehab;Therapeutic Activity;Therapeutic Procedures/Exercise   Therapeutic  Procedures/Exercise   Therapeutic Procedure: strength, endurance, ROM, flexibillity minutes (81593) 16   Treatment Detail/Skilled Intervention Pt ambulated in germain and navigated stairs. Treadmill deferred due to patient fatigue   Treatment Time Includes (CR Only) Monitoring of vital signs (see vital signs flowsheet for details);See specific exercise details intervention group(s)   Therapeutic Activities   Therapeutic Activity Minutes (64798) 9   Treatment Detail/Skilled Intervention Upon arrival pt very agreeable to therapy. Pt participated in lengthy cardiac education; refer to flowsheet below for topics. Pt very engaged in discussion   Ambulation   Duration (minutes)   (3 minutes then 5 minutes)   Effort Scale 6   Symptoms Fatigue  (leg fatigue)   Cardiovascular Response Normal   Exercise Details Pt ambulated in germain at slow steady pace.   Vital Signs Details HR in 80s with activity. /67 pre activity and 139/69 post activity   Stairs   Workload 10 stairs   Effort Scale 1   Symptoms Denies symptoms   Cardiovascular Response Normal   Exercise Details Pt navigated stairs with one railing at slow pace with modified independence   Cardiac Education   Education Provided Diagnosis;Emergency plan;Heart anatomy;Home exercise program;OMNI Scale;Outpatient Cardiac Rehab;Precautions;Resuming home activities;Risk factors;Signs and symptoms;Stop light tool   Education Packet Given to Patient Yes   All Patient Education Handouts Reviewed with Patient and/or Family Yes   Cardiac Rehab Phase II Plan   Phase II Order Received Yes   Phase II Appointment Status Scheduled   Date/Time 3/14/24   Location [facility]   OT Discharge Planning   OT Plan PM: Treadmill   OT Discharge Recommendation (DC Rec) home with assist;home with outpatient cardiac rehab   OT Rationale for DC Rec Anticipate that pt will discharge home with spouse assist with strenuous IADLs (cooking, cleaning, laundry, transportation, etc). Recommend OP CR for a  progressive monitored exercise program and to promote a heart healthy lifestyle   OT Brief overview of current status Tolerated ambulation in germain and stairs. Leg fatigue. No CV symptoms. BP increase from 119 to 139   OT Equipment Needed at Discharge   (None)   Total Session Time   Timed Code Treatment Minutes 25   Total Session Time (sum of timed and untimed services) 30

## 2024-03-03 NOTE — PROGRESS NOTES
Occupational Therapy Discharge Summary    Reason for therapy discharge:    Discharged to home with outpatient therapy.    Progress towards therapy goal(s). See goals on Care Plan in [facility] electronic health record for goal details.  Goals partially met.  Barriers to achieving goals:   discharge from facility.    Therapy recommendation(s):    Continued therapy is recommended.  Rationale/Recommendations:  Anticipate that pt will discharge home with spouse assist with strenuous IADLs (cooking, cleaning, laundry, transportation, etc). Recommend OP CR for a progressive monitored exercise program and to promote a heart healthy lifestyle.  Continue home exercise program.

## 2024-03-04 ENCOUNTER — PATIENT OUTREACH (OUTPATIENT)
Dept: CARE COORDINATION | Facility: CLINIC | Age: 74
End: 2024-03-04
Payer: COMMERCIAL

## 2024-03-04 ENCOUNTER — TELEPHONE (OUTPATIENT)
Dept: CARDIOLOGY | Facility: CLINIC | Age: 74
End: 2024-03-04
Payer: COMMERCIAL

## 2024-03-04 NOTE — TELEPHONE ENCOUNTER
Patient was admitted to [facility] on 2/27/24 with chest pain and STEMI. Medication noncompliance - has been off statin due to intolerance and stopped aspirin many years ago, additional lost to cardiology follow up last visit in 2013. Emergent coronary angiogram showed complex anatomy with multivessel CAD. Transferred to [facility] for CV surgery and cardiology consultation for intervention on 2/27/24.     PMH: CAD with history of MI and prior CABG, hypertension, hyperlipidemia, medication and cardiology follow up noncompliance.     2/27/24: Echo showed EF of 40-45%. Inferolateral, distal lateral and apical hypokinesis. Limited views obtained. Grossly normal right ventricular systolic function. Cardiac valves not well-visualized.     2/27/24: Coronary angiogram via RFA showed multivessel CAD with PTCB to the ostium of RI. Transferred to [facility].    Decision made for complex PCI.    2/29/24: Repeat echo showed EF of 60-65%. No regional wall motion abnormalities noted. The right ventricle is normal in size and function. There is no pericardial effusion. There is mild (1+) aortic regurgitation.    3/1/24: Coronary angiogram via RRA resulted in:      Dist LM lesion is 20% stenosed.    Prox RCA lesion is 100% stenosed.    Origin to Prox Graft lesion is 35% stenosed.    2nd Mrg lesion is 100% stenosed.    RPDA lesion is 90% stenosed.    Mid Cx lesion is 100% stenosed.    Prox Graft lesion is 80% stenosed.    Ramus-2 lesion is 75% stenosed.    Prox LAD lesion is 99% stenosed.    Ramus-1 lesion is 75% stenosed.    Prox Cx lesion is 99% stenosed.    IVUS was performed on the lesion.    Ultrasound (IVUS) was performed.     Staged angioplasty with stent of the ramus intermedius with a 3.0 x 38 and a 4.0 x 38 mm Synergy CATALINA.  Staged angioplasty with stent of the ostial vein graft to diagonal with a 3. 0 x 20 mm Synergy CATALINA.   and of the midLCx and severely diseased OM1 were not crossable with antegrade wire escalation and a  microcatheter: recommend medical management.     Pt was started on Brilinta, Losartan. PTA ASA and NTG were continued. Metoprolol tartrate changed to succinate. Benicar was discontinued at time of discharge.    Called patient to discuss any post hospital d/c questions, review medication changes, and confirm f/u appts. Patient denied any questions regarding new medications or changes to PTA medications.     RN confirmed with patient that he was d/c with an adequate supply of the antiplatelet Brilinta, and reminded of importance of taking without interruption.     Pt has an Rx for PRN SL Nitroglycerin.     Patient denied any SOB, chest pain or light headedness.     RFA and RRA cardiac cath sites are without bleeding, swelling, redness or signs of infection.     RN confirmed with patient that he still needs to be scheduled for an FARHAD OV as ordered. Scheduling and Dr. Rosales Team RN phone numbers provided.    Cardiac rehab is scheduled on 3/14/24 at 1100 in [place].    Patient advised to call clinic with any cardiac related questions or concerns prior to this farhad't. Patient verbalized understanding and agreed with plan. HERON Mast RN.

## 2024-03-04 NOTE — PROGRESS NOTES
Clinic Care Coordination Contact  [facility]: Post-Discharge Note  SITUATION                                                      Admission:    Admission Date: 02/27/24   Reason for Admission: STEMI  Discharge:   Discharge Date: 03/02/24  Discharge Diagnosis: STEMI    BACKGROUND                                                      Lokesh Shipley Jr. is a 73 year old male with past medical history including coronary artery disease with history of MI and prior CABG, hypertension, hyperlipidemia who presents to [facility] with chest pain, found to have STEMI and complex anatomy, transferred to [facility] for CV surgery and cardiology consultation for intervention. Admitted on 2/27/2024.      STEMI status post PTCB 2/27/2024   Coronary artery disease with prior MI and CABG  Ischemic cardiomyopathy   Hypertension  Hyperlipidemia  Medication non-adherence  Borderline diabetes type 2  *Presented with severe chest pain  *EKG with STEMI in aVR with diffuse ST depressions. Troponin 31->452->527.  *Echocardiogram with EF 40-45%, inferolateral, distal lateral and apical hypokinesis  - Cardiology and CV surgery consulted, appreciate input, plan between redo CABG versus complex PCI.  Cardiology will perform PCI 3/1. CV surgery  discussed case and decided that open surgical approach is not  needed at this time. Cardiology will defer cardiac MRI to outpatient for staged approach considerations of non culprit vessels.   - Aspirin, ticagrelor (on hold), metoprolol, losartan to continue.  Dose adjustment as per cardiology.  - NTG gtt and heparin gtt. to continue  -Continue on telemetry, hemoglobin A1c is 6.1 - Previous hospitalist discussed with  patient about lifestyle modification .  - Statin intolerant per notes, plan for eventual PSK9 inhibitor per cardiology  - Patients medications adjusted per cards.  - Patient to follow up with cardiology on discharge.     ASSESSMENT           Discharge Assessment  How are you  doing now that you are home?: Patient shares that he is doing well. No questions/concerns or needs at this time. Declines assistance with scheduleing appt's and thanks writer for the call  How are your symptoms? (Red Flag symptoms escalate to triage hotline per guidelines): Improved  Do you feel your condition is stable enough to be safe at home until your provider visit?: Yes  Does the patient have their discharge instructions? : Yes  Does the patient have questions regarding their discharge instructions? : No  Were you started on any new medications or were there changes to any of your previous medications? : Yes  Does the patient have all of their medications?: Yes  Do you have questions regarding any of your medications? : No  Do you have all of your needed medical supplies or equipment (DME)?  (i.e. oxygen tank, CPAP, cane, etc.): Yes  Discharge follow-up appointment scheduled within 14 calendar days? : Yes  Discharge Follow Up Appointment Date: 03/14/24 (Cardiac Rehab-Writer will call to schedule other follow up appt's)  Discharge Follow Up Appointment Scheduled with?: Specialty Care Provider    Post-op ([facility] CTA Only)  If the patient had a surgery or procedure, do they have any questions for a nurse?: No    Post-op (Clinicians Only)  Did the patient have surgery or a procedure: Yes  Incision: closed  Drainage: No  Fever: No  Chills: No  Redness: No  Warmth: No        PLAN                                                      Outpatient Plan:    Follow up with primary care provider, Zhaira Morales, within 7 days for hospital follow- up.  No follow up labs or test are needed.  Follow up with cardiology on discharge        Future Appointments   Date Time Provider Department Center   3/14/2024 11:15 AM 3, [facility] Cardiac Re[facility]         For any urgent concerns, please contact our 24 hour nurse triage line: [phone] ([phone])         THIERNO Iniguez

## 2024-03-05 LAB — ACT BLD: 278 SECONDS (ref 74–150)

## 2024-03-06 LAB
ACT BLD: NORMAL S
ATRIAL RATE - MUSE: 59 BPM
ATRIAL RATE - MUSE: 64 BPM
DIASTOLIC BLOOD PRESSURE - MUSE: NORMAL MMHG
DIASTOLIC BLOOD PRESSURE - MUSE: NORMAL MMHG
INTERPRETATION ECG - MUSE: NORMAL
INTERPRETATION ECG - MUSE: NORMAL
P AXIS - MUSE: 51 DEGREES
P AXIS - MUSE: 77 DEGREES
PR INTERVAL - MUSE: 238 MS
PR INTERVAL - MUSE: 252 MS
QRS DURATION - MUSE: 114 MS
QRS DURATION - MUSE: 116 MS
QT - MUSE: 436 MS
QT - MUSE: 444 MS
QTC - MUSE: 431 MS
QTC - MUSE: 458 MS
R AXIS - MUSE: 47 DEGREES
R AXIS - MUSE: 53 DEGREES
SYSTOLIC BLOOD PRESSURE - MUSE: NORMAL MMHG
SYSTOLIC BLOOD PRESSURE - MUSE: NORMAL MMHG
T AXIS - MUSE: 25 DEGREES
T AXIS - MUSE: 265 DEGREES
VENTRICULAR RATE- MUSE: 59 BPM
VENTRICULAR RATE- MUSE: 64 BPM

## 2024-03-08 ENCOUNTER — TELEPHONE (OUTPATIENT)
Dept: CARDIOLOGY | Facility: CLINIC | Age: 74
End: 2024-03-08

## 2024-03-08 ENCOUNTER — OFFICE VISIT (OUTPATIENT)
Dept: CARDIOLOGY | Facility: CLINIC | Age: 74
End: 2024-03-08
Attending: HOSPITALIST
Payer: COMMERCIAL

## 2024-03-08 VITALS
HEART RATE: 66 BPM | BODY MASS INDEX: 30.44 KG/M2 | WEIGHT: 205.5 LBS | SYSTOLIC BLOOD PRESSURE: 130 MMHG | DIASTOLIC BLOOD PRESSURE: 74 MMHG | HEIGHT: 69 IN | OXYGEN SATURATION: 100 %

## 2024-03-08 DIAGNOSIS — I21.29 ST ELEVATION MYOCARDIAL INFARCTION (STEMI) INVOLVING OTHER CORONARY ARTERY (H): ICD-10-CM

## 2024-03-08 DIAGNOSIS — I25.10 CORONARY ARTERY DISEASE INVOLVING NATIVE CORONARY ARTERY OF NATIVE HEART WITHOUT ANGINA PECTORIS: Primary | ICD-10-CM

## 2024-03-08 PROCEDURE — 99214 OFFICE O/P EST MOD 30 MIN: CPT | Performed by: INTERNAL MEDICINE

## 2024-03-08 RX ORDER — METOPROLOL SUCCINATE 25 MG/1
25 TABLET, EXTENDED RELEASE ORAL DAILY
Qty: 90 TABLET | Refills: 3 | Status: SHIPPED | OUTPATIENT
Start: 2024-03-08

## 2024-03-08 NOTE — PROGRESS NOTES
[facility] Progress note    Assessment:  CAD, history of CABG and recent STEMI with POBA ramus followed by staged PCI of ramus and SVG to OM1.  On asa, ticagrelor, metoprolol.   Patent LIMA to LAD (By CT), SVG to diagonal, SVG to RCA.   Residual  LCX, severe stenosis OM1 and severe stenosis RPDA will be treated medically.     Normal EF 60-65%  HTN  HLD  Statin intolerance. Has had severe myalgias with multiple different statins.     Plan:  PCSK-9 is indicated.  Evolocumab 140mg subcutaneous every other week was ordered.   Will start cardiac rehabilitation 3/14/24  Recheck lipids in about 3 months. Follow up with me in about 6 months.     HPI:   Lokesh Shipley Jr. Is a very nice 73 year old M patient here for follow up of CAD and recent STEMI. Also has HTN, dyslipidemia with severe statin intolerance.    He was hospitalized recently as noted for STEMI.  He had POBA of culprit ramus and was evaluated for re-do CABG partly due to concern for ostial LIMA stenosis.  A CT angio showed no ostial LIMA disease and echo showed normal EF.  CV surgery felt he was more appropriate for PCI.     His ramus intermedius and SVG diagonal were subsequently stented. He has been feeling quite well since discharge from the hospital. He is tolerating his medications and taking them regularly.  He does not have chest pain or abnormal dyspnea.     Cardiac Catheterization  Result Date: 3/1/2024    Dist LM lesion is 20% stenosed.    Prox RCA lesion is 100% stenosed.    Origin to Prox Graft lesion is 35% stenosed.    2nd Mrg lesion is 100% stenosed.    RPDA lesion is 90% stenosed.    Mid Cx lesion is 100% stenosed.    Prox Graft lesion is 80% stenosed.    Ramus-2 lesion is 75% stenosed.    Prox LAD lesion is 99% stenosed.    Ramus-1 lesion is 75% stenosed.    Prox Cx lesion is 99% stenosed.    IVUS was performed on the lesion.    Ultrasound (IVUS) was performed.    Staged angioplasty with stent of the ramus intermedius with a 3.0 x  38 and   a 4.0 x 38mm Synergy CATALINA.  Staged angioplasty with stent of the ostial vein graft to diagonal with a   3. 0 x 20mm Synergy CATALINA.   and of the midLCx and severely diseased OM1 were not crossable with   antegrade wire escalation and a microcatheter: recommend medical   management.      Echo 2/29/24 Interpretation Summary The visual ejection fraction is 60-65%. No regional wall motion abnormalitiesnoted.The right ventricle is normal in size and function.There is no pericardial effusion.There is mild (1+) aortic regurgitation.The aortic Sinus(es) of Valsalva are borderline dilated. Measure 4 cms. EF is better than prior study. Although image quality was very poor on thelimtied study on 2/27/24. EF is on this study is normal.      CURRENT MEDICATIONS:  Current Outpatient Medications   Medication Sig Dispense Refill    acetaminophen (TYLENOL) 325 MG tablet Take 2 tablets (650 mg) by mouth every 4 hours as needed for mild pain or headaches      aspirin 81 MG EC tablet Take 1 tablet (81 mg) by mouth daily Start tomorrow. 30 tablet 3    losartan (COZAAR) 50 MG tablet Take 1 tablet (50 mg) by mouth daily 90 tablet 3    metoprolol succinate ER (TOPROL XL) 25 MG 24 hr tablet Take 1 tablet (25 mg) by mouth daily 90 tablet 3    nitroGLYcerin (NITROSTAT) 0.4 MG sublingual tablet For chest pain place 1 tablet under the tongue every 5 minutes for 3 doses. If symptoms persist 5 minutes after 1st dose call 911.      ticagrelor (BRILINTA) 90 MG tablet Take 1 tablet (90 mg) by mouth 2 times daily Dose to start tomorrow morning. 180 tablet 3       ALLERGIES     Allergies   Allergen Reactions    Pcn [Penicillins] Shortness Of Breath    Sulfa Antibiotics Shortness Of Breath       PAST MEDICAL, SURGICAL, FAMILY, SOCIAL HISTORY:  History was reviewed and updated as needed, see medical record.    REVIEW OF SYSTEMS:  Skin:        Eyes:       ENT:  Positive for epistaxis  Respiratory:  Negative    Cardiovascular:  Negative     Gastroenterology:      Genitourinary:       Musculoskeletal:       Neurologic:  Positive for numbness or tingling of hands  Psychiatric:       Heme/Lymph/Imm:       Endocrine:         PHYSICAL EXAM:      BP: 130/74 Pulse: 66     SpO2: 100 %      Vital Signs with Ranges  Pulse:  [66] 66  BP: (130)/(74) 130/74  SpO2:  [100 %] 100 %  205 lbs 8 oz    Constitutional: awake, alert, no distress  Eyes: sclera nonicteric  ENT: trachea midline  Respiratory: clear to auscultation bilaterally  Cardiovascular: regular rate and rhythm no murmur  GI: nondistended, nontender, bowel sounds present  Skin: dry, no rash no edema  Musculoskeletal: grossly normal muscle bulk and tone=  Neurologic: no gross focal deficits  Neuropsychiatric: normal affect    Recent Lab Results:  LIPID RESULTS:  Lab Results   Component Value Date    CHOL 189 03/01/2024    CHOL 200 12/17/2013    HDL 50 03/01/2024    HDL 48 12/17/2013     (H) 03/01/2024     (H) 12/17/2013    TRIG 84 03/01/2024    TRIG 95 12/17/2013    CHOLHDLRATIO 4.2 12/17/2013       LIVER ENZYME RESULTS:  Lab Results   Component Value Date     (H) 02/28/2024    AST 39 10/18/2014    ALT 29 02/28/2024    ALT 42 10/18/2014       CBC RESULTS:  Lab Results   Component Value Date    WBC 5.9 03/02/2024    WBC 7.4 10/21/2014    RBC 4.17 (L) 03/02/2024    RBC 4.85 10/18/2014    HGB 12.7 (L) 03/02/2024    HGB 12.0 (L) 03/14/2020    HCT 36.7 (L) 03/02/2024    HCT 43.9 10/18/2014    MCV 88 03/02/2024    MCV 91 10/18/2014    MCH 30.5 03/02/2024    MCH 31.8 10/18/2014    MCHC 34.6 03/02/2024    MCHC 35.1 10/18/2014    RDW 11.9 03/02/2024    RDW 12.6 10/18/2014     03/02/2024     10/18/2014       BMP RESULTS:  Lab Results   Component Value Date     03/02/2024     03/13/2020    POTASSIUM 4.3 03/02/2024    POTASSIUM 4.3 02/27/2024    POTASSIUM 4.0 09/20/2021    POTASSIUM 4.4 03/13/2020    CHLORIDE 102 03/02/2024    CHLORIDE 103 02/27/2024    CHLORIDE 106  09/20/2021    CHLORIDE 106 03/13/2020    CO2 23 03/02/2024    CO2 27 09/20/2021    CO2 29 03/13/2020    ANIONGAP 11 03/02/2024    ANIONGAP 3 09/20/2021    ANIONGAP 5 03/13/2020    GLC 96 03/02/2024     (H) 02/27/2024     (H) 09/20/2021     (H) 03/13/2020    BUN 11.6 03/02/2024    BUN 17 02/27/2024    BUN 14 09/20/2021    BUN 15 03/13/2020    CR 0.78 03/02/2024    CR 0.72 03/13/2020    GFRESTIMATED >90 03/02/2024    GFRESTIMATED 56 (L) 09/19/2021    GFRESTIMATED >90 03/13/2020    GFRESTBLACK >90 03/13/2020    BROOK 9.0 03/02/2024    BROOK 8.5 03/13/2020        A1C RESULTS:  Lab Results   Component Value Date    A1C 6.1 (H) 02/28/2024    A1C 5.7 02/18/2009       INR RESULTS:  Lab Results   Component Value Date    INR 0.92 09/19/2021    INR 1.00 07/18/2009    INR 1.31 (H) 02/18/2009

## 2024-03-08 NOTE — LETTER
3/8/2024    Zahira Morales PA-C  [facility] [address]  [place] [address]    RE: Lokesh Patten Quinten Osei.       Dear Colleague,     I had the pleasure of seeing Lokesh Shipley Jr. in the [facility].  [facility] Progress note    Assessment:  CAD, history of CABG and recent STEMI with POBA ramus followed by staged PCI of ramus and SVG to OM1.  On asa, ticagrelor, metoprolol.   Patent LIMA to LAD (By CT), SVG to diagonal, SVG to RCA.   Residual  LCX, severe stenosis OM1 and severe stenosis RPDA will be treated medically.     Normal EF 60-65%  HTN  HLD  Statin intolerance. Has had severe myalgias with multiple different statins.     Plan:  PCSK-9 is indicated.  Evolocumab 140mg subcutaneous every other week was ordered.   Will start cardiac rehabilitation 3/14/24  Recheck lipids in about 3 months. Follow up with me in about 6 months.     HPI:   Lokesh Shipley Jr. Is a very nice 73 year old M patient here for follow up of CAD and recent STEMI. Also has HTN, dyslipidemia with severe statin intolerance.    He was hospitalized recently as noted for STEMI.  He had POBA of culprit ramus and was evaluated for re-do CABG partly due to concern for ostial LIMA stenosis.  A CT angio showed no ostial LIMA disease and echo showed normal EF.  CV surgery felt he was more appropriate for PCI.     His ramus intermedius and SVG diagonal were subsequently stented. He has been feeling quite well since discharge from the hospital. He is tolerating his medications and taking them regularly.  He does not have chest pain or abnormal dyspnea.     Cardiac Catheterization  Result Date: 3/1/2024    Dist LM lesion is 20% stenosed.    Prox RCA lesion is 100% stenosed.    Origin to Prox Graft lesion is 35% stenosed.    2nd Mrg lesion is 100% stenosed.    RPDA lesion is 90% stenosed.    Mid Cx lesion is 100% stenosed.    Prox Graft lesion is 80% stenosed.    Ramus-2 lesion is 75% stenosed.    Prox LAD  lesion is 99% stenosed.    Ramus-1 lesion is 75% stenosed.    Prox Cx lesion is 99% stenosed.    IVUS was performed on the lesion.    Ultrasound (IVUS) was performed.    Staged angioplasty with stent of the ramus intermedius with a 3.0 x 38 and   a 4.0 x 38mm Synergy CATALINA.  Staged angioplasty with stent of the ostial vein graft to diagonal with a   3. 0 x 20mm Synergy CATALINA.   and of the midLCx and severely diseased OM1 were not crossable with   antegrade wire escalation and a microcatheter: recommend medical   management.      Echo 2/29/24 Interpretation Summary The visual ejection fraction is 60-65%. No regional wall motion abnormalitiesnoted.The right ventricle is normal in size and function.There is no pericardial effusion.There is mild (1+) aortic regurgitation.The aortic Sinus(es) of Valsalva are borderline dilated. Measure 4 cms. EF is better than prior study. Although image quality was very poor on thelimtied study on 2/27/24. EF is on this study is normal.      CURRENT MEDICATIONS:  Current Outpatient Medications   Medication Sig Dispense Refill    acetaminophen (TYLENOL) 325 MG tablet Take 2 tablets (650 mg) by mouth every 4 hours as needed for mild pain or headaches      aspirin 81 MG EC tablet Take 1 tablet (81 mg) by mouth daily Start tomorrow. 30 tablet 3    losartan (COZAAR) 50 MG tablet Take 1 tablet (50 mg) by mouth daily 90 tablet 3    metoprolol succinate ER (TOPROL XL) 25 MG 24 hr tablet Take 1 tablet (25 mg) by mouth daily 90 tablet 3    nitroGLYcerin (NITROSTAT) 0.4 MG sublingual tablet For chest pain place 1 tablet under the tongue every 5 minutes for 3 doses. If symptoms persist 5 minutes after 1st dose call 911.      ticagrelor (BRILINTA) 90 MG tablet Take 1 tablet (90 mg) by mouth 2 times daily Dose to start tomorrow morning. 180 tablet 3       ALLERGIES     Allergies   Allergen Reactions    Pcn [Penicillins] Shortness Of Breath    Sulfa Antibiotics Shortness Of Breath       PAST MEDICAL,  SURGICAL, FAMILY, SOCIAL HISTORY:  History was reviewed and updated as needed, see medical record.    REVIEW OF SYSTEMS:  Skin:        Eyes:       ENT:  Positive for epistaxis  Respiratory:  Negative    Cardiovascular:  Negative    Gastroenterology:      Genitourinary:       Musculoskeletal:       Neurologic:  Positive for numbness or tingling of hands  Psychiatric:       Heme/Lymph/Imm:       Endocrine:         PHYSICAL EXAM:      BP: 130/74 Pulse: 66     SpO2: 100 %      Vital Signs with Ranges  Pulse:  [66] 66  BP: (130)/(74) 130/74  SpO2:  [100 %] 100 %  205 lbs 8 oz    Constitutional: awake, alert, no distress  Eyes: sclera nonicteric  ENT: trachea midline  Respiratory: clear to auscultation bilaterally  Cardiovascular: regular rate and rhythm no murmur  GI: nondistended, nontender, bowel sounds present  Skin: dry, no rash no edema  Musculoskeletal: grossly normal muscle bulk and tone=  Neurologic: no gross focal deficits  Neuropsychiatric: normal affect    Recent Lab Results:  LIPID RESULTS:  Lab Results   Component Value Date    CHOL 189 03/01/2024    CHOL 200 12/17/2013    HDL 50 03/01/2024    HDL 48 12/17/2013     (H) 03/01/2024     (H) 12/17/2013    TRIG 84 03/01/2024    TRIG 95 12/17/2013    CHOLHDLRATIO 4.2 12/17/2013       LIVER ENZYME RESULTS:  Lab Results   Component Value Date     (H) 02/28/2024    AST 39 10/18/2014    ALT 29 02/28/2024    ALT 42 10/18/2014       CBC RESULTS:  Lab Results   Component Value Date    WBC 5.9 03/02/2024    WBC 7.4 10/21/2014    RBC 4.17 (L) 03/02/2024    RBC 4.85 10/18/2014    HGB 12.7 (L) 03/02/2024    HGB 12.0 (L) 03/14/2020    HCT 36.7 (L) 03/02/2024    HCT 43.9 10/18/2014    MCV 88 03/02/2024    MCV 91 10/18/2014    MCH 30.5 03/02/2024    MCH 31.8 10/18/2014    MCHC 34.6 03/02/2024    MCHC 35.1 10/18/2014    RDW 11.9 03/02/2024    RDW 12.6 10/18/2014     03/02/2024     10/18/2014       BMP RESULTS:  Lab Results   Component Value  Date     03/02/2024     03/13/2020    POTASSIUM 4.3 03/02/2024    POTASSIUM 4.3 02/27/2024    POTASSIUM 4.0 09/20/2021    POTASSIUM 4.4 03/13/2020    CHLORIDE 102 03/02/2024    CHLORIDE 103 02/27/2024    CHLORIDE 106 09/20/2021    CHLORIDE 106 03/13/2020    CO2 23 03/02/2024    CO2 27 09/20/2021    CO2 29 03/13/2020    ANIONGAP 11 03/02/2024    ANIONGAP 3 09/20/2021    ANIONGAP 5 03/13/2020    GLC 96 03/02/2024     (H) 02/27/2024     (H) 09/20/2021     (H) 03/13/2020    BUN 11.6 03/02/2024    BUN 17 02/27/2024    BUN 14 09/20/2021    BUN 15 03/13/2020    CR 0.78 03/02/2024    CR 0.72 03/13/2020    GFRESTIMATED >90 03/02/2024    GFRESTIMATED 56 (L) 09/19/2021    GFRESTIMATED >90 03/13/2020    GFRESTBLACK >90 03/13/2020    BROOK 9.0 03/02/2024    BROOK 8.5 03/13/2020        A1C RESULTS:  Lab Results   Component Value Date    A1C 6.1 (H) 02/28/2024    A1C 5.7 02/18/2009       INR RESULTS:  Lab Results   Component Value Date    INR 0.92 09/19/2021    INR 1.00 07/18/2009    INR 1.31 (H) 02/18/2009                             Thank you for allowing me to participate in the care of your patient.      Sincerely,     Steff Miranda MD     [facility]  cc:   Amber Byers MD  [address] [address]

## 2024-03-08 NOTE — TELEPHONE ENCOUNTER
Central Prior Authorization Team   Phone: [phone]    PA Initiation    Medication: Repatha 140mg/ml  Insurance Company: [employer] - Phone [phone] Fax [phone]  Pharmacy Filling the Rx: [facility] [place] - [address]  Filling Pharmacy Phone: [phone]  Filling Pharmacy Fax:    Start Date: 3/8/2024      Finished via EPA

## 2024-03-12 NOTE — TELEPHONE ENCOUNTER
Prior Authorization Approval    Authorization Effective Date: 1/1/2024  Authorization Expiration Date: 3/8/2025  Medication: Repatha 140mg/ml  Approved Dose/Quantity:   Reference #:     Insurance Company: [employer] - Phone [phone] Fax [phone]  Expected CoPay:       CoPay Card Available:      Foundation Assistance Needed:    Which Pharmacy is filling the prescription (Not needed for infusion/clinic administered): [facility] [address] [place], [place] [address] [address] [facility] [facility]  Pharmacy Notified:  yes  Patient Notified:  yes- Pharmacy will contact patient when ready to /ship

## 2024-03-14 ENCOUNTER — HOSPITAL ENCOUNTER (OUTPATIENT)
Dept: CARDIAC REHAB | Facility: CLINIC | Age: 74
Discharge: HOME | End: 2024-03-14
Attending: INTERNAL MEDICINE
Payer: COMMERCIAL

## 2024-03-14 DIAGNOSIS — Z95.5 S/P DRUG ELUTING CORONARY STENT PLACEMENT: ICD-10-CM

## 2024-03-14 DIAGNOSIS — I21.01 ST ELEVATION MYOCARDIAL INFARCTION INVOLVING LEFT MAIN CORONARY ARTERY (H): ICD-10-CM

## 2024-03-14 PROCEDURE — 93798 PHYS/QHP OP CAR RHAB W/ECG: CPT | Performed by: REHABILITATION PRACTITIONER

## 2024-03-14 PROCEDURE — 93797 PHYS/QHP OP CAR RHAB WO ECG: CPT | Mod: 59 | Performed by: REHABILITATION PRACTITIONER

## 2024-03-18 ENCOUNTER — HOSPITAL ENCOUNTER (OUTPATIENT)
Dept: CARDIAC REHAB | Facility: CLINIC | Age: 74
Discharge: HOME | End: 2024-03-18
Attending: INTERNAL MEDICINE
Payer: COMMERCIAL

## 2024-03-18 PROCEDURE — 93798 PHYS/QHP OP CAR RHAB W/ECG: CPT | Performed by: REHABILITATION PRACTITIONER

## 2024-03-21 ENCOUNTER — HOSPITAL ENCOUNTER (OUTPATIENT)
Dept: CARDIAC REHAB | Facility: CLINIC | Age: 74
Discharge: HOME | End: 2024-03-21
Attending: INTERNAL MEDICINE
Payer: COMMERCIAL

## 2024-03-21 PROCEDURE — 93798 PHYS/QHP OP CAR RHAB W/ECG: CPT

## 2024-03-26 DIAGNOSIS — Z95.5 S/P DRUG ELUTING CORONARY STENT PLACEMENT: ICD-10-CM

## 2024-03-28 ENCOUNTER — HOSPITAL ENCOUNTER (OUTPATIENT)
Dept: CARDIAC REHAB | Facility: CLINIC | Age: 74
Discharge: HOME | End: 2024-03-28
Attending: INTERNAL MEDICINE
Payer: COMMERCIAL

## 2024-03-28 PROCEDURE — 93798 PHYS/QHP OP CAR RHAB W/ECG: CPT

## 2024-04-01 ENCOUNTER — HOSPITAL ENCOUNTER (OUTPATIENT)
Dept: CARDIAC REHAB | Facility: CLINIC | Age: 74
Discharge: HOME | End: 2024-04-01
Attending: INTERNAL MEDICINE
Payer: COMMERCIAL

## 2024-04-01 PROCEDURE — 93798 PHYS/QHP OP CAR RHAB W/ECG: CPT

## 2024-04-11 ENCOUNTER — HOSPITAL ENCOUNTER (OUTPATIENT)
Dept: CARDIAC REHAB | Facility: CLINIC | Age: 74
Discharge: HOME | End: 2024-04-11
Attending: INTERNAL MEDICINE
Payer: COMMERCIAL

## 2024-04-11 PROCEDURE — 93798 PHYS/QHP OP CAR RHAB W/ECG: CPT

## 2024-04-22 ENCOUNTER — HOSPITAL ENCOUNTER (OUTPATIENT)
Dept: CARDIAC REHAB | Facility: CLINIC | Age: 74
Discharge: HOME | End: 2024-04-22
Attending: INTERNAL MEDICINE
Payer: COMMERCIAL

## 2024-04-22 PROCEDURE — 93798 PHYS/QHP OP CAR RHAB W/ECG: CPT

## 2024-04-24 ENCOUNTER — HOSPITAL ENCOUNTER (OUTPATIENT)
Dept: CARDIAC REHAB | Facility: CLINIC | Age: 74
Discharge: HOME | End: 2024-04-24
Attending: INTERNAL MEDICINE
Payer: COMMERCIAL

## 2024-04-24 PROCEDURE — 93798 PHYS/QHP OP CAR RHAB W/ECG: CPT | Performed by: REHABILITATION PRACTITIONER

## 2024-04-25 ENCOUNTER — TELEPHONE (OUTPATIENT)
Dept: CARDIOLOGY | Facility: CLINIC | Age: 74
End: 2024-04-25
Payer: COMMERCIAL

## 2024-04-25 NOTE — TELEPHONE ENCOUNTER
Attempted to call patient back. Patient did not answer. Voicemail left with direct call back number.

## 2024-04-25 NOTE — TELEPHONE ENCOUNTER
[facility] Call Center    Phone Message    May a detailed message be left on voicemail: yes     Reason for Call: Other: Sandor would like to be contacted back to discuss symptoms of fatigue he's been experiencing with the Brlinta.  He prefers email, if possible, otherwise phone.     Action Taken: Other: Cardiology    Travel Screening: Not Applicable    Thank you!  [facility]

## 2024-04-26 ENCOUNTER — HOSPITAL ENCOUNTER (OUTPATIENT)
Dept: CARDIAC REHAB | Facility: CLINIC | Age: 74
Discharge: HOME | End: 2024-04-26
Attending: INTERNAL MEDICINE
Payer: COMMERCIAL

## 2024-04-26 PROCEDURE — 93798 PHYS/QHP OP CAR RHAB W/ECG: CPT

## 2024-04-26 NOTE — TELEPHONE ENCOUNTER
Patient called with complaints of severe back pain related to Repatha. Patient stated he has chronic back pain, but this pain is different muscle pain than his chronic pain. Patient stated back pain started within the first week of taking the Repatha. The back pain is continuous and has not been relieved. Patient has attempted heat, ice and Tylenol. Only get some relief from never stimulator machine. Will route to Dr. Miranda for further review and recommendation. Patient verbalized understanding and in agreement with plan.

## 2024-04-29 ENCOUNTER — HOSPITAL ENCOUNTER (OUTPATIENT)
Dept: CARDIAC REHAB | Facility: CLINIC | Age: 74
Discharge: HOME | End: 2024-04-29
Attending: INTERNAL MEDICINE
Payer: COMMERCIAL

## 2024-04-29 PROCEDURE — 93798 PHYS/QHP OP CAR RHAB W/ECG: CPT

## 2024-04-30 NOTE — TELEPHONE ENCOUNTER
4/30 Attempted to call patient with below response. Patient did not answer. Voicemail left with direct call back number.    4/30: Patient called back. Reviewed below recommendations from Dr. Miranda. Patient verbalized understanding and in agreement with plan. Patient will call back in 2 weeks to review results of Repatha hold.                Steff Miranda MD  [facility] Heart Structural Nurse1 hour ago (11:46 AM)     MT  Recommend Discontinuation of repatha.  If back pain continues beyond two weeks, then symptoms may be unrelated to Repatha.  For severe unrelenting back pain recommend scheduling PCP or urgent care visit.    Please schedule cardiology follow up in 1-2 months to review options for alternative lipid therapy.    Steff Miranda MD on 4/30/2024 at 11:46 AM

## 2024-09-06 NOTE — PROGRESS NOTES
Primary Cardiologist: Dr. Miranda    Reason for Visit: 6-month follow-up    History of Present Illness:   Sandor is alma pleasant 74-year-old male with past medical history notable for CAD (history of CABG x 3 in 2009 with LIMA to LAD, SVG to diagonal, and SVG to RCA; recent history of STEMI in 2/2024 receiving POBA of ramus followed by staged PCI of ramus and SVG to OM1; has been on DAPT in the form of aspirin and Brilinta as well as metoprolol; has residual  of left circumflex, severe stenosis of OM1 and severe stenosis of RPDA which were all treated medically; his LIMA, SVG to diagonal, and SVG to RCA were all patent), hypertension, and hyperlipidemia (history of myalgias on multiple forms of statin therapy; most recently had worsening of his chronic back pain shortly after initiating Repatha which has been on hold).    Sandor reports he had all over body aches when he was on repatha. His symptoms resolved since he stopping using it. He has no other concerns or questions.     Assessment and Plan:  Sandor is a very pleasant 74-year-old male with past medical history notable for CAD (history of CABG x 3 in 2009 with LIMA to LAD, SVG to diagonal, and SVG to RCA; recent history of STEMI in 2/2024 receiving POBA of ramus followed by staged PCI of ramus and SVG to OM1; has been on DAPT in the form of aspirin and Brilinta as well as metoprolol; has residual  of left circumflex, severe stenosis of OM1 and severe stenosis of RPDA which were all treated medically; his LIMA, SVG to diagonal, and SVG to RCA were all patent), hypertension, and hyperlipidemia (history of myalgias on multiple forms of statin therapy; most recently had worsening of his chronic back pain shortly after initiating Repatha which has been on hold).    We will see if we can initiate patient on Leqvio. He has not been able to tolerate repatha due to flu like symptoms which is  a known side effect from PCSK9 inhibitors. I will check in with our  pharmacy team first to obtain cost/coverage information.     50 minutes spent on the date of the encounter with chart review, patient visit, care coordination, and documentation.    The longitudinal plan of care for the diagnose(s)/condition(s) as documented were addressed during this visit. Due to the added complexity in care, I will continue to support Lokesh Shipley Jr.  in the subsequent management and with ongoing continuity of care.     This note was completed in part using Dragon voice recognition software. Although reviewed after completion, some word and grammatical errors may occur.    Orders this Visit:  No orders of the defined types were placed in this encounter.    No orders of the defined types were placed in this encounter.    There are no discontinued medications.      Encounter Diagnosis   Name Primary?    Coronary artery disease involving native coronary artery of native heart without angina pectoris        CURRENT MEDICATIONS:  Current Outpatient Medications   Medication Sig Dispense Refill    aspirin 81 MG EC tablet Take 1 tablet (81 mg) by mouth daily Start tomorrow. 30 tablet 3    losartan (COZAAR) 50 MG tablet Take 1 tablet (50 mg) by mouth daily 90 tablet 3    metoprolol succinate ER (TOPROL XL) 25 MG 24 hr tablet Take 1 tablet (25 mg) by mouth daily 90 tablet 3    nitroGLYcerin (NITROSTAT) 0.4 MG sublingual tablet For chest pain place 1 tablet under the tongue every 5 minutes for 3 doses. If symptoms persist 5 minutes after 1st dose call 911.      ticagrelor (BRILINTA) 90 MG tablet Take 1 tablet (90 mg) by mouth 2 times daily 180 tablet 4    acetaminophen (TYLENOL) 325 MG tablet Take 2 tablets (650 mg) by mouth every 4 hours as needed for mild pain or headaches (Patient not taking: Reported on 9/10/2024)         ALLERGIES     Allergies   Allergen Reactions    Pcn [Penicillins] Shortness Of Breath    Sulfa Antibiotics Shortness Of Breath       PAST MEDICAL HISTORY:  Past Medical History:    Diagnosis Date    Coronary artery disease     Heart attack (H) 2000    Hypertension     Other chronic pain     low back and radiates down both legs.  Also, numbness & tingling down both legs.    Stented coronary artery 2000    x1       PAST SURGICAL HISTORY:  Past Surgical History:   Procedure Laterality Date    angiogram with stent placement      BACK SURGERY      low back discectomy    CARDIAC SURGERY  2009    CABG x 3    CV CORONARY ANGIOGRAM N/A 2/27/2024    Procedure: Coronary Angiogram;  Surgeon: Moe Rosales MD;  Location: [facility] CARDIAC CATH LAB    CV CORONARY ANGIOGRAM N/A 3/1/2024    Procedure: Coronary Angiogram;  Surgeon: Steff Miranda MD;  Location: [facility] CARDIAC CATH LAB    CV INTRAVASULAR ULTRASOUND N/A 2/27/2024    Procedure: Intravascular Ultrasound;  Surgeon: Moe Rosales MD;  Location: [facility] CARDIAC CATH LAB    CV PCI N/A 2/27/2024    Procedure: Percutaneous Coronary Intervention;  Surgeon: Moe Rosales MD;  Location: [facility] CARDIAC CATH LAB    CV PCI N/A 3/1/2024    Procedure: Percutaneous Coronary Intervention;  Surgeon: Steff Miranda MD;  Location: [facility] CARDIAC CATH LAB    OPTICAL TRACKING SYSTEM FUSION SPINE POSTERIOR LUMBAR TWO LEVELS N/A 3/12/2020    Procedure: L3-4 and L4-5 decompressive laminectomies with medial facetectomies with decompression and exposure of the L3, L4 and L5 roots bilaterally  Placement of Nuvasive Reline traction pedicle screws bilaterally from L3-5  L3-5 posterior lateral fusion with placement of locally harvested autologous bone;  Surgeon: Jose Lundberg MD;  Location: [facility] OR       FAMILY HISTORY:  History reviewed. No pertinent family history.    SOCIAL HISTORY:  Social History     Socioeconomic History    Marital status:      Spouse name: None    Number of children: None    Years of education: None    Highest education level: None   Tobacco Use    Smoking status: Never    Smokeless tobacco: Never  "  Substance and Sexual Activity    Alcohol use: Yes     Comment: 0-4 weekly    Drug use: Never     Social Determinants of Health     Financial Resource Strain: Low Risk  (3/6/2024)    [facility]    Financial Resource Strain     Difficulty of Paying Living Expenses: 3   Food Insecurity: No Food Insecurity (3/6/2024)    [facility]    Food Insecurity     Worried About Running Out of Food in the Last Year: 1   Transportation Needs: No Transportation Needs (3/6/2024)    [facility] from [facility]    Transportation Needs     Lack of Transportation (Medical): 1   Social Connections: Socially Integrated (3/6/2024)    [facility] from [facility], [facility]    Social Connections     Frequency of Communication with Friends and Family: 0   Housing Stability: Low Risk  (3/6/2024)    Received from [facility], [facility]    Housing Stability     Unable to Pay for Housing in the Last Year: 1       Review of Systems:  Skin:  not assessed     Eyes:  not assessed    ENT:  not assessed    Respiratory:  Negative    Cardiovascular:  Negative    Gastroenterology: not assessed    Genitourinary:  not assessed    Musculoskeletal:  not assessed    Neurologic:  not assessed    Psychiatric:  not assessed    Heme/Lymph/Imm:  not assessed    Endocrine:  not assessed      Physical Exam:  Vitals: BP (!) 140/74 (BP Location: Right arm, Patient Position: Sitting, Cuff Size: Adult Regular)   Pulse 56   Ht 1.753 m (5' 9\")   Wt 90 kg (198 lb 6.4 oz)   SpO2 97%   BMI 29.30 kg/m       GEN:  NAD  NECK: No JVD  C/V:  Regular rate and rhythm, no murmur, rub or " gallop.  RESP: Clear to auscultation bilaterally without wheezing, rales, or rhonchi.  GI: Abdomen soft, nontender, nondistended.   EXTREM: No pitting LE edema.   NEURO: Alert and oriented, cooperative. No obvious focal deficits.   PSYCH: Normal affect.  SKIN: Warm and dry.       Recent Lab Results:  LIPID RESULTS:  Lab Results   Component Value Date    CHOL 189 03/01/2024    CHOL 200 12/17/2013    HDL 50 03/01/2024    HDL 48 12/17/2013     (H) 03/01/2024     (H) 12/17/2013    TRIG 84 03/01/2024    TRIG 95 12/17/2013    CHOLHDLRATIO 4.2 12/17/2013       LIVER ENZYME RESULTS:  Lab Results   Component Value Date     (H) 02/28/2024    AST 39 10/18/2014    ALT 29 02/28/2024    ALT 42 10/18/2014       CBC RESULTS:  Lab Results   Component Value Date    WBC 5.9 03/02/2024    WBC 7.4 10/21/2014    RBC 4.17 (L) 03/02/2024    RBC 4.85 10/18/2014    HGB 12.7 (L) 03/02/2024    HGB 12.0 (L) 03/14/2020    HCT 36.7 (L) 03/02/2024    HCT 43.9 10/18/2014    MCV 88 03/02/2024    MCV 91 10/18/2014    MCH 30.5 03/02/2024    MCH 31.8 10/18/2014    MCHC 34.6 03/02/2024    MCHC 35.1 10/18/2014    RDW 11.9 03/02/2024    RDW 12.6 10/18/2014     03/02/2024     10/18/2014       BMP RESULTS:  Lab Results   Component Value Date     03/02/2024     03/13/2020    POTASSIUM 4.3 03/02/2024    POTASSIUM 4.3 02/27/2024    POTASSIUM 4.0 09/20/2021    POTASSIUM 4.4 03/13/2020    CHLORIDE 102 03/02/2024    CHLORIDE 103 02/27/2024    CHLORIDE 106 09/20/2021    CHLORIDE 106 03/13/2020    CO2 23 03/02/2024    CO2 27 09/20/2021    CO2 29 03/13/2020    ANIONGAP 11 03/02/2024    ANIONGAP 3 09/20/2021    ANIONGAP 5 03/13/2020    GLC 96 03/02/2024     (H) 02/27/2024     (H) 09/20/2021     (H) 03/13/2020    BUN 11.6 03/02/2024    BUN 17 02/27/2024    BUN 14 09/20/2021    BUN 15 03/13/2020    CR 0.78 03/02/2024    CR 0.72 03/13/2020    GFRESTIMATED >90 03/02/2024    GFRESTIMATED 56 (L) 09/19/2021     GFRESTIMATED >90 03/13/2020    GFRESTBLACK >90 03/13/2020    BROOK 9.0 03/02/2024    BROOK 8.5 03/13/2020        A1C RESULTS:  Lab Results   Component Value Date    A1C 6.1 (H) 02/28/2024    A1C 5.7 02/18/2009       INR RESULTS:  Lab Results   Component Value Date    INR 0.92 09/19/2021    INR 1.00 07/18/2009    INR 1.31 (H) 02/18/2009             Kevin Perez PA-C  September 6, 2024

## 2024-09-10 ENCOUNTER — OFFICE VISIT (OUTPATIENT)
Dept: CARDIOLOGY | Facility: CLINIC | Age: 74
End: 2024-09-10
Attending: INTERNAL MEDICINE
Payer: COMMERCIAL

## 2024-09-10 VITALS
HEART RATE: 56 BPM | SYSTOLIC BLOOD PRESSURE: 140 MMHG | OXYGEN SATURATION: 97 % | BODY MASS INDEX: 29.38 KG/M2 | DIASTOLIC BLOOD PRESSURE: 74 MMHG | WEIGHT: 198.4 LBS | HEIGHT: 69 IN

## 2024-09-10 DIAGNOSIS — I25.10 CORONARY ARTERY DISEASE INVOLVING NATIVE CORONARY ARTERY OF NATIVE HEART WITHOUT ANGINA PECTORIS: ICD-10-CM

## 2024-09-10 PROCEDURE — 99214 OFFICE O/P EST MOD 30 MIN: CPT | Performed by: PHYSICIAN ASSISTANT

## 2024-09-10 PROCEDURE — G2211 COMPLEX E/M VISIT ADD ON: HCPCS | Performed by: PHYSICIAN ASSISTANT

## 2024-09-10 NOTE — LETTER
9/10/2024    Zahira Morales PA-C  [facility] [address]  [facility] [address]    RE: Lokesh Shipley Jr.       Dear Colleague,     I had the pleasure of seeing Lokesh Shipley Jr. in the [facility].  Primary Cardiologist: Dr. Miranda    Reason for Visit: 6-month follow-up    History of Present Illness:   Sandor is alma pleasant 74-year-old male with past medical history notable for CAD (history of CABG x 3 in 2009 with LIMA to LAD, SVG to diagonal, and SVG to RCA; recent history of STEMI in 2/2024 receiving POBA of ramus followed by staged PCI of ramus and SVG to OM1; has been on DAPT in the form of aspirin and Brilinta as well as metoprolol; has residual  of left circumflex, severe stenosis of OM1 and severe stenosis of RPDA which were all treated medically; his LIMA, SVG to diagonal, and SVG to RCA were all patent), hypertension, and hyperlipidemia (history of myalgias on multiple forms of statin therapy; most recently had worsening of his chronic back pain shortly after initiating Repatha which has been on hold).    Sandor reports he had all over body aches when he was on repatha. His symptoms resolved since he stopping using it. He has no other concerns or questions.     Assessment and Plan:  Sandor is a very pleasant 74-year-old male with past medical history notable for CAD (history of CABG x 3 in 2009 with LIMA to LAD, SVG to diagonal, and SVG to RCA; recent history of STEMI in 2/2024 receiving POBA of ramus followed by staged PCI of ramus and SVG to OM1; has been on DAPT in the form of aspirin and Brilinta as well as metoprolol; has residual  of left circumflex, severe stenosis of OM1 and severe stenosis of RPDA which were all treated medically; his LIMA, SVG to diagonal, and SVG to RCA were all patent), hypertension, and hyperlipidemia (history of myalgias on multiple forms of statin therapy; most recently had worsening of his chronic back pain shortly after initiating  Repatha which has been on hold).    We will see if we can initiate patient on Leqvio. He has not been able to tolerate repatha due to flu like symptoms which is  a known side effect from PCSK9 inhibitors. I will check in with our pharmacy team first to obtain cost/coverage information.     50 minutes spent on the date of the encounter with chart review, patient visit, care coordination, and documentation.    The longitudinal plan of care for the diagnose(s)/condition(s) as documented were addressed during this visit. Due to the added complexity in care, I will continue to support Lokesh Shipley Jr.  in the subsequent management and with ongoing continuity of care.     This note was completed in part using Dragon voice recognition software. Although reviewed after completion, some word and grammatical errors may occur.    Orders this Visit:  No orders of the defined types were placed in this encounter.    No orders of the defined types were placed in this encounter.    There are no discontinued medications.      Encounter Diagnosis   Name Primary?     Coronary artery disease involving native coronary artery of native heart without angina pectoris        CURRENT MEDICATIONS:  Current Outpatient Medications   Medication Sig Dispense Refill     aspirin 81 MG EC tablet Take 1 tablet (81 mg) by mouth daily Start tomorrow. 30 tablet 3     losartan (COZAAR) 50 MG tablet Take 1 tablet (50 mg) by mouth daily 90 tablet 3     metoprolol succinate ER (TOPROL XL) 25 MG 24 hr tablet Take 1 tablet (25 mg) by mouth daily 90 tablet 3     nitroGLYcerin (NITROSTAT) 0.4 MG sublingual tablet For chest pain place 1 tablet under the tongue every 5 minutes for 3 doses. If symptoms persist 5 minutes after 1st dose call 911.       ticagrelor (BRILINTA) 90 MG tablet Take 1 tablet (90 mg) by mouth 2 times daily 180 tablet 4     acetaminophen (TYLENOL) 325 MG tablet Take 2 tablets (650 mg) by mouth every 4 hours as needed for mild pain or  headaches (Patient not taking: Reported on 9/10/2024)         ALLERGIES     Allergies   Allergen Reactions     Pcn [Penicillins] Shortness Of Breath     Sulfa Antibiotics Shortness Of Breath       PAST MEDICAL HISTORY:  Past Medical History:   Diagnosis Date     Coronary artery disease      Heart attack (H) 2000     Hypertension      Other chronic pain     low back and radiates down both legs.  Also, numbness & tingling down both legs.     Stented coronary artery 2000    x1       PAST SURGICAL HISTORY:  Past Surgical History:   Procedure Laterality Date     angiogram with stent placement       BACK SURGERY      low back discectomy     CARDIAC SURGERY  2009    CABG x 3     CV CORONARY ANGIOGRAM N/A 2/27/2024    Procedure: Coronary Angiogram;  Surgeon: Moe Rosales MD;  Location: [facility] CARDIAC CATH LAB     CV CORONARY ANGIOGRAM N/A 3/1/2024    Procedure: Coronary Angiogram;  Surgeon: Steff Miranda MD;  Location: [facility] CARDIAC CATH LAB     CV INTRAVASULAR ULTRASOUND N/A 2/27/2024    Procedure: Intravascular Ultrasound;  Surgeon: Moe Rosales MD;  Location: [facility] CARDIAC CATH LAB     CV PCI N/A 2/27/2024    Procedure: Percutaneous Coronary Intervention;  Surgeon: Moe Rosales MD;  Location: [facility] CARDIAC CATH LAB     CV PCI N/A 3/1/2024    Procedure: Percutaneous Coronary Intervention;  Surgeon: Stfef Miranda MD;  Location: [facility] CARDIAC CATH LAB     OPTICAL TRACKING SYSTEM FUSION SPINE POSTERIOR LUMBAR TWO LEVELS N/A 3/12/2020    Procedure: L3-4 and L4-5 decompressive laminectomies with medial facetectomies with decompression and exposure of the L3, L4 and L5 roots bilaterally  Placement of Nuvasive Reline traction pedicle screws bilaterally from L3-5  L3-5 posterior lateral fusion with placement of locally harvested autologous bone;  Surgeon: Jose Lundberg MD;  Location:  OR       FAMILY HISTORY:  History reviewed. No pertinent family history.    SOCIAL  HISTORY:  Social History     Socioeconomic History     Marital status:      Spouse name: None     Number of children: None     Years of education: None     Highest education level: None   Tobacco Use     Smoking status: Never     Smokeless tobacco: Never   Substance and Sexual Activity     Alcohol use: Yes     Comment: 0-4 weekly     Drug use: Never     Social Determinants of Health     Financial Resource Strain: Low Risk  (3/6/2024)    Received from [facility], [facility]    Financial Resource Strain      Difficulty of Paying Living Expenses: 3   Food Insecurity: No Food Insecurity (3/6/2024)    Received from [facility] [employer] [facility], [facility]    Food Insecurity      Worried About Running Out of Food in the Last Year: 1   Transportation Needs: No Transportation Needs (3/6/2024)    Received from [facility], [facility]    Transportation Needs      Lack of Transportation (Medical): 1   Social Connections: Socially Integrated (3/6/2024)    Received from [facility], [facility]    Social Connections      Frequency of Communication with Friends and Family: 0   Housing Stability: Low Risk  (3/6/2024)    Received [facility] [employer] [email or web address]    Housing Stability      Unable to Pay for Housing in the Last Year: 1       Review of Systems:  Skin:  not assessed     Eyes:  not assessed    ENT:  not assessed    Respiratory:  Negative    Cardiovascular:  Negative    Gastroenterology: not assessed    Genitourinary:  not assessed    Musculoskeletal:  not assessed    Neurologic:  not assessed    Psychiatric:  not assessed    Heme/Lymph/Imm:  not assessed    Endocrine:  not assessed      Physical  "Exam:  Vitals: BP (!) 140/74 (BP Location: Right arm, Patient Position: Sitting, Cuff Size: Adult Regular)   Pulse 56   Ht 1.753 m (5' 9\")   Wt 90 kg (198 lb 6.4 oz)   SpO2 97%   BMI 29.30 kg/m       GEN:  NAD  NECK: No JVD  C/V:  Regular rate and rhythm, no murmur, rub or gallop.  RESP: Clear to auscultation bilaterally without wheezing, rales, or rhonchi.  GI: Abdomen soft, nontender, nondistended.   EXTREM: No pitting LE edema.   NEURO: Alert and oriented, cooperative. No obvious focal deficits.   PSYCH: Normal affect.  SKIN: Warm and dry.       Recent Lab Results:  LIPID RESULTS:  Lab Results   Component Value Date    CHOL 189 03/01/2024    CHOL 200 12/17/2013    HDL 50 03/01/2024    HDL 48 12/17/2013     (H) 03/01/2024     (H) 12/17/2013    TRIG 84 03/01/2024    TRIG 95 12/17/2013    CHOLHDLRATIO 4.2 12/17/2013       LIVER ENZYME RESULTS:  Lab Results   Component Value Date     (H) 02/28/2024    AST 39 10/18/2014    ALT 29 02/28/2024    ALT 42 10/18/2014       CBC RESULTS:  Lab Results   Component Value Date    WBC 5.9 03/02/2024    WBC 7.4 10/21/2014    RBC 4.17 (L) 03/02/2024    RBC 4.85 10/18/2014    HGB 12.7 (L) 03/02/2024    HGB 12.0 (L) 03/14/2020    HCT 36.7 (L) 03/02/2024    HCT 43.9 10/18/2014    MCV 88 03/02/2024    MCV 91 10/18/2014    MCH 30.5 03/02/2024    MCH 31.8 10/18/2014    MCHC 34.6 03/02/2024    MCHC 35.1 10/18/2014    RDW 11.9 03/02/2024    RDW 12.6 10/18/2014     03/02/2024     10/18/2014       BMP RESULTS:  Lab Results   Component Value Date     03/02/2024     03/13/2020    POTASSIUM 4.3 03/02/2024    POTASSIUM 4.3 02/27/2024    POTASSIUM 4.0 09/20/2021    POTASSIUM 4.4 03/13/2020    CHLORIDE 102 03/02/2024    CHLORIDE 103 02/27/2024    CHLORIDE 106 09/20/2021    CHLORIDE 106 03/13/2020    CO2 23 03/02/2024    CO2 27 09/20/2021    CO2 29 03/13/2020    ANIONGAP 11 03/02/2024    ANIONGAP 3 09/20/2021    ANIONGAP 5 03/13/2020    GLC 96 " 03/02/2024     (H) 02/27/2024     (H) 09/20/2021     (H) 03/13/2020    BUN 11.6 03/02/2024    BUN 17 02/27/2024    BUN 14 09/20/2021    BUN 15 03/13/2020    CR 0.78 03/02/2024    CR 0.72 03/13/2020    GFRESTIMATED >90 03/02/2024    GFRESTIMATED 56 (L) 09/19/2021    GFRESTIMATED >90 03/13/2020    GFRESTBLACK >90 03/13/2020    BROOK 9.0 03/02/2024    BROOK 8.5 03/13/2020        A1C RESULTS:  Lab Results   Component Value Date    A1C 6.1 (H) 02/28/2024    A1C 5.7 02/18/2009       INR RESULTS:  Lab Results   Component Value Date    INR 0.92 09/19/2021    INR 1.00 07/18/2009    INR 1.31 (H) 02/18/2009             Kevin Perez PA-C  September 6, 2024       Thank you for allowing me to participate in the care of your patient.      Sincerely,     Kevin Perez PA-C     [facility]  cc:   Steff Miranda MD  [address] [address]

## 2024-09-12 ENCOUNTER — TELEPHONE (OUTPATIENT)
Dept: CARDIOLOGY | Facility: CLINIC | Age: 74
End: 2024-09-12
Payer: COMMERCIAL

## 2024-09-12 NOTE — TELEPHONE ENCOUNTER
----- Message from Kevin Perez sent at 9/10/2024 10:58 AM CDT -----  Can you contact and update patient regarding the info below?    ThanksKevin  ----- Message -----  From: Radha Card  Sent: 9/10/2024  10:53 AM CDT  To: Kevin Perez PA-C    Leqvio is a Clinic Administered Medication (CAM), and pricing on this can not be estimated by Pharmacy Liaison.      If patient would like a price estimate, patient may:  1) contact Member Services department of their insurance or   2) complete a cost of care estimate request at [email or web address] or by calling [phone].    In both cases, patient should reference J-code  and CPT code 82941.    Radha Card  Pharmacy Technician/Liaison, Discharge Pharmacy   [phone] (voice or text)  [email or web address]  Available on [employer] and Teams  ----- Message -----  From: Kevin Perez PA-C  Sent: 9/10/2024  10:45 AM CDT  To: Rx Coverage Check For Heart Clinics    I would like to start this patient on Leqvio. Please let me know if this covered. Patient had side effects with Praluent so we had to stop it.     ThanksKevin

## 2024-12-17 DIAGNOSIS — I25.10 CORONARY ARTERY DISEASE INVOLVING NATIVE CORONARY ARTERY OF NATIVE HEART WITHOUT ANGINA PECTORIS: Primary | ICD-10-CM

## 2025-03-10 DIAGNOSIS — I21.29 ST ELEVATION MYOCARDIAL INFARCTION (STEMI) INVOLVING OTHER CORONARY ARTERY (H): ICD-10-CM

## 2025-03-10 RX ORDER — METOPROLOL SUCCINATE 25 MG/1
25 TABLET, EXTENDED RELEASE ORAL DAILY
Qty: 90 TABLET | Refills: 2 | Status: SHIPPED | OUTPATIENT
Start: 2025-03-10

## 2025-03-10 RX ORDER — LOSARTAN POTASSIUM 50 MG/1
50 TABLET ORAL DAILY
Qty: 90 TABLET | Refills: 2 | Status: SHIPPED | OUTPATIENT
Start: 2025-03-10

## 2025-03-22 ENCOUNTER — HEALTH MAINTENANCE LETTER (OUTPATIENT)
Age: 75
End: 2025-03-22

## 2025-04-23 ENCOUNTER — TELEPHONE (OUTPATIENT)
Dept: CARDIOLOGY | Facility: CLINIC | Age: 75
End: 2025-04-23

## 2025-04-23 NOTE — TELEPHONE ENCOUNTER
1st attempt- Left voicemail for the patient to call back and schedule the following:    Appointment type:  Return Cardiology  Provider:  Kevin/Dr Miranda/iVri Akhtar  Return date:  5/6/25 spot on hold @ 3:20  Additional appointment(s) needed:  Lipid labs at least one day prior to cardiolgy appt  Additional Notes:  n/a  Specialty phone number: [phone]

## 2025-05-05 ENCOUNTER — LAB (OUTPATIENT)
Dept: LAB | Facility: CLINIC | Age: 75
End: 2025-05-05
Payer: COMMERCIAL

## 2025-05-05 DIAGNOSIS — I25.10 CORONARY ARTERY DISEASE INVOLVING NATIVE CORONARY ARTERY OF NATIVE HEART WITHOUT ANGINA PECTORIS: ICD-10-CM

## 2025-05-05 LAB
CHOLEST SERPL-MCNC: 200 MG/DL
FASTING STATUS PATIENT QL REPORTED: YES
HDLC SERPL-MCNC: 48 MG/DL
LDLC SERPL CALC-MCNC: 136 MG/DL
NONHDLC SERPL-MCNC: 152 MG/DL
TRIGL SERPL-MCNC: 82 MG/DL

## 2025-05-05 PROCEDURE — 80061 LIPID PANEL: CPT

## 2025-05-05 PROCEDURE — 36415 COLL VENOUS BLD VENIPUNCTURE: CPT

## 2025-05-06 ENCOUNTER — OFFICE VISIT (OUTPATIENT)
Dept: CARDIOLOGY | Facility: CLINIC | Age: 75
End: 2025-05-06
Payer: COMMERCIAL

## 2025-05-06 VITALS
WEIGHT: 211.6 LBS | SYSTOLIC BLOOD PRESSURE: 140 MMHG | HEIGHT: 69 IN | OXYGEN SATURATION: 95 % | BODY MASS INDEX: 31.34 KG/M2 | DIASTOLIC BLOOD PRESSURE: 78 MMHG | HEART RATE: 61 BPM

## 2025-05-06 DIAGNOSIS — E78.5 HYPERLIPIDEMIA LDL GOAL <70: Primary | ICD-10-CM

## 2025-05-06 DIAGNOSIS — I25.10 CORONARY ARTERY DISEASE INVOLVING NATIVE CORONARY ARTERY OF NATIVE HEART WITHOUT ANGINA PECTORIS: ICD-10-CM

## 2025-05-06 PROCEDURE — G2211 COMPLEX E/M VISIT ADD ON: HCPCS | Performed by: PHYSICIAN ASSISTANT

## 2025-05-06 PROCEDURE — 3077F SYST BP >= 140 MM HG: CPT | Performed by: PHYSICIAN ASSISTANT

## 2025-05-06 PROCEDURE — 99215 OFFICE O/P EST HI 40 MIN: CPT | Performed by: PHYSICIAN ASSISTANT

## 2025-05-06 PROCEDURE — 3078F DIAST BP <80 MM HG: CPT | Performed by: PHYSICIAN ASSISTANT

## 2025-05-06 NOTE — LETTER
"5/6/2025    Zahira Morales PA-C  [facility] [address]  [facility] [place] [address]    RE: Lokesh Shipley Jr.       Dear Colleague,     I had the pleasure of seeing Lokesh Sandor Shipley Jr. in the [facility].  Primary Cardiologist: Dr. Miranda     Reason for Visit: Annual follow-up    History of Present Illness:   Lokesh NEFF Jr. Quinten \"Sandor\" is a very pleasant 75-year-old male with past medical history notable for CAD (history of CABG x 3 in 2009 with LIMA to LAD, SVG to diagonal, and SVG to RCA; recent history of STEMI in 2/2024 receiving POBA of ramus followed by staged PCI of ramus and SVG to OM1; has been on DAPT in the form of aspirin and Brilinta as well as metoprolol; has residual  of left circumflex, severe stenosis of OM1 and severe stenosis of RPDA which were all treated medically; his LIMA, SVG to diagonal, and SVG to RCA were all patent), hypertension, and hyperlipidemia (history of myalgias on multiple forms of statin therapy; most recently had worsening of his chronic back pain shortly after initiating Repatha which has been on hold; was considered for Leqvio but his insurance did not cover this; he has a different insurance now).    He returns today with his spouse stating that he is doing well.  He denies any symptoms of shortness of breath, orthopnea, PND, or peripheral edema.  He has not looked into Leqvio since our last discussion.    Assessment and Plan:  Lokesh NEFF Jr. Quinten \"Sandor\" is a very pleasant 75-year-old male with past medical history notable for CAD (history of CABG x 3 in 2009 with LIMA to LAD, SVG to diagonal, and SVG to RCA; recent history of STEMI in 2/2024 receiving POBA of ramus followed by staged PCI of ramus and SVG to OM1; has been on DAPT in the form of aspirin and Brilinta as well as metoprolol; has residual  of left circumflex, severe stenosis of OM1 and severe stenosis of RPDA which were all treated medically; his LIMA, SVG to diagonal, and " SVG to RCA were all patent), hypertension, and hyperlipidemia (history of myalgias on multiple forms of statin therapy; most recently had worsening of his chronic back pain shortly after initiating Repatha which has been on hold; was considered for Leqvio but his insurance did not cover this; he has a different insurance now).    He is doing well from a cardiovascular standpoint.  Blood pressure was elevated on initial check but on second check it came down.  We talked about Leqvio recommendations.  He has new insurance now so he will look into coverage for this.  He will finish his current prescription of Brilinta and then will be done with this.  Will continue with 20 mg daily.  Will see him back in 1 year for follow-up.      41 minutes spent on the date of the encounter with chart review, patient visit, care coordination, and documentation.    The longitudinal plan of care for the diagnose(s)/condition(s) as documented were addressed during this visit. Due to the added complexity in care, I will continue to support Lokesh Shipley Jr.  in the subsequent management and with ongoing continuity of care.     This note was completed in part using Dragon voice recognition software. Although reviewed after completion, some word and grammatical errors may occur.    Orders this Visit:  No orders of the defined types were placed in this encounter.    Orders Placed This Encounter   Medications     MULTIPLE VITAMIN PO     Sig: Take by mouth daily.     Medications Discontinued During This Encounter   Medication Reason     acetaminophen (TYLENOL) 325 MG tablet Therapy completed (No AVS)     ticagrelor (BRILINTA) 90 MG tablet          Encounter Diagnosis   Name Primary?     Coronary artery disease involving native coronary artery of native heart without angina pectoris        CURRENT MEDICATIONS:  Current Outpatient Medications   Medication Sig Dispense Refill     aspirin 81 MG EC tablet Take 1 tablet (81 mg) by mouth daily  Start tomorrow. 30 tablet 3     losartan (COZAAR) 50 MG tablet Take 1 tablet (50 mg) by mouth daily. (Patient taking differently: Take 50 mg by mouth every evening.) 90 tablet 2     metoprolol succinate ER (TOPROL XL) 25 MG 24 hr tablet Take 1 tablet (25 mg) by mouth daily. (Patient taking differently: Take 25 mg by mouth every morning.) 90 tablet 2     MULTIPLE VITAMIN PO Take by mouth daily.       nitroGLYcerin (NITROSTAT) 0.4 MG sublingual tablet For chest pain place 1 tablet under the tongue every 5 minutes for 3 doses. If symptoms persist 5 minutes after 1st dose call 911.         ALLERGIES     Allergies   Allergen Reactions     Pcn [Penicillins] Shortness Of Breath     Sulfa Antibiotics Shortness Of Breath       PAST MEDICAL HISTORY:  Past Medical History:   Diagnosis Date     Coronary artery disease      Heart attack (H) 2000     Hypertension      Other chronic pain     low back and radiates down both legs.  Also, numbness & tingling down both legs.     Stented coronary artery 2000    x1       PAST SURGICAL HISTORY:  Past Surgical History:   Procedure Laterality Date     angiogram with stent placement       BACK SURGERY      low back discectomy     CARDIAC SURGERY  2009    CABG x 3     CV CORONARY ANGIOGRAM N/A 2/27/2024    Procedure: Coronary Angiogram;  Surgeon: Moe Rosales MD;  Location: [facility] CARDIAC CATH LAB     CV CORONARY ANGIOGRAM N/A 3/1/2024    Procedure: Coronary Angiogram;  Surgeon: Steff Miranda MD;  Location: [facility] CARDIAC CATH LAB     CV INTRAVASULAR ULTRASOUND N/A 2/27/2024    Procedure: Intravascular Ultrasound;  Surgeon: Moe Rosales MD;  Location: [facility] CARDIAC CATH LAB     CV PCI N/A 2/27/2024    Procedure: Percutaneous Coronary Intervention;  Surgeon: Moe Rosales MD;  Location: [facility] CARDIAC CATH LAB     CV PCI N/A 3/1/2024    Procedure: Percutaneous Coronary Intervention;  Surgeon: Steff Miranda MD;  Location: [facility] CARDIAC CATH LAB     OPTICAL  TRACKING SYSTEM FUSION SPINE POSTERIOR LUMBAR TWO LEVELS N/A 3/12/2020    Procedure: L3-4 and L4-5 decompressive laminectomies with medial facetectomies with decompression and exposure of the L3, L4 and L5 roots bilaterally  Placement of Nuvasive Reline traction pedicle screws bilaterally from L3-5  L3-5 posterior lateral fusion with placement of locally harvested autologous bone;  Surgeon: Jose Lundberg MD;  Location: [facility] OR       FAMILY HISTORY:  Family History   Problem Relation Age of Onset     Heart Surgery Mother         bypass     Diabetes Father         late onset     Heart Surgery Father         bypass       SOCIAL HISTORY:  Social History     Socioeconomic History     Marital status:      Spouse name: None     Number of children: None     Years of education: None     Highest education level: None   Tobacco Use     Smoking status: Never     Smokeless tobacco: Never   Substance and Sexual Activity     Alcohol use: Yes     Comment: 0-4 weekly     Drug use: Never     Social Drivers of Health     Financial Resource Strain: Low Risk  (3/6/2024)    Received from [facility]    Financial Resource Strain      Difficulty of Paying Living Expenses: 3   Food Insecurity: No Food Insecurity (3/6/2024)    Received from [facility]    Food Insecurity      Do you worry your food will run out before you are able to buy more?: 1   Transportation Needs: No Transportation Needs (3/6/2024)    Received from [facility]    Transportation Needs      Does lack of transportation keep you from medical appointments?: 1      Does lack of transportation keep you from work, meetings or getting things that you need?: 1   Social Connections: Socially Integrated (3/6/2024)    Received from [facility]    Social Connections      Do you often feel  "lonely or isolated from those around you?: 0   Housing Stability: Low Risk  (3/6/2024)    [facility] [employer] [facility]    Housing Stability      What is your housing situation today?: 1       Review of Systems:  Skin:        Eyes:       ENT:       Respiratory:       Cardiovascular:       Gastroenterology:      Genitourinary:       Musculoskeletal:       Neurologic:       Psychiatric:       Heme/Lymph/Imm:       Endocrine:         Physical Exam:  Vitals: BP (!) 140/78 (BP Location: Right arm, Patient Position: Sitting, Cuff Size: Adult Regular)   Pulse 61   Ht 1.753 m (5' 9\")   Wt 96 kg (211 lb 9.6 oz)   SpO2 95%   BMI 31.25 kg/m       GEN:  NAD  NECK: No JVD  C/V:  Regular rate and rhythm, no murmur, rub or gallop.  RESP: Clear to auscultation bilaterally without wheezing, rales, or rhonchi.  GI: Abdomen soft, nontender, nondistended.   EXTREM: No pitting LE edema.   NEURO: Alert and oriented, cooperative. No obvious focal deficits.   PSYCH: Normal affect.  SKIN: Warm and dry.       Recent Lab Results:  LIPID RESULTS:  Lab Results   Component Value Date    CHOL 200 (H) 05/05/2025    CHOL 200 12/17/2013    HDL 48 05/05/2025    HDL 48 12/17/2013     (H) 05/05/2025     (H) 12/17/2013    TRIG 82 05/05/2025    TRIG 95 12/17/2013    CHOLHDLRATIO 4.2 12/17/2013       LIVER ENZYME RESULTS:  Lab Results   Component Value Date     (H) 02/28/2024    AST 39 10/18/2014    ALT 29 02/28/2024    ALT 42 10/18/2014       CBC RESULTS:  Lab Results   Component Value Date    WBC 5.9 03/02/2024    WBC 7.4 10/21/2014    RBC 4.17 (L) 03/02/2024    RBC 4.85 10/18/2014    HGB 12.7 (L) 03/02/2024    HGB 12.0 (L) 03/14/2020    HCT 36.7 (L) 03/02/2024    HCT 43.9 10/18/2014    MCV 88 03/02/2024    MCV 91 10/18/2014    MCH 30.5 03/02/2024    MCH 31.8 10/18/2014    MCHC 34.6 03/02/2024    MCHC 35.1 10/18/2014    RDW 11.9 03/02/2024    RDW 12.6 10/18/2014     03/02/2024     " 10/18/2014       BMP RESULTS:  Lab Results   Component Value Date     03/02/2024     03/13/2020    POTASSIUM 4.3 03/02/2024    POTASSIUM 4.3 02/27/2024    POTASSIUM 4.0 09/20/2021    POTASSIUM 4.4 03/13/2020    CHLORIDE 102 03/02/2024    CHLORIDE 103 02/27/2024    CHLORIDE 106 09/20/2021    CHLORIDE 106 03/13/2020    CO2 23 03/02/2024    CO2 27 09/20/2021    CO2 29 03/13/2020    ANIONGAP 11 03/02/2024    ANIONGAP 3 09/20/2021    ANIONGAP 5 03/13/2020    GLC 96 03/02/2024     (H) 02/27/2024     (H) 09/20/2021     (H) 03/13/2020    BUN 11.6 03/02/2024    BUN 17 02/27/2024    BUN 14 09/20/2021    BUN 15 03/13/2020    CR 0.78 03/02/2024    CR 0.72 03/13/2020    GFRESTIMATED >90 03/02/2024    GFRESTIMATED 56 (L) 09/19/2021    GFRESTIMATED >90 03/13/2020    GFRESTBLACK >90 03/13/2020    BROOK 9.0 03/02/2024    BROOK 8.5 03/13/2020        A1C RESULTS:  Lab Results   Component Value Date    A1C 6.1 (H) 02/28/2024    A1C 5.7 02/18/2009       INR RESULTS:  Lab Results   Component Value Date    INR 0.92 09/19/2021    INR 1.00 07/18/2009    INR 1.31 (H) 02/18/2009             Kevin Perez PA-C  May 6, 2025     Thank you for allowing me to participate in the care of your patient.      Sincerely,     Kevin Perez PA-C     [facility]  cc:   Kevin Perez PA-C  [address] [place] [address]

## 2025-05-06 NOTE — PROGRESS NOTES
"Primary Cardiologist: Dr. Miranda     Reason for Visit: Annual follow-up    History of Present Illness:   Lokesh Shipley \"Sandor\" is a very pleasant 75-year-old male with past medical history notable for CAD (history of CABG x 3 in 2009 with LIMA to LAD, SVG to diagonal, and SVG to RCA; recent history of STEMI in 2/2024 receiving POBA of ramus followed by staged PCI of ramus and SVG to OM1; has been on DAPT in the form of aspirin and Brilinta as well as metoprolol; has residual  of left circumflex, severe stenosis of OM1 and severe stenosis of RPDA which were all treated medically; his LIMA, SVG to diagonal, and SVG to RCA were all patent), hypertension, and hyperlipidemia (history of myalgias on multiple forms of statin therapy; most recently had worsening of his chronic back pain shortly after initiating Repatha which has been on hold; was considered for Leqvio but his insurance did not cover this; he has a different insurance now).    He returns today with his spouse stating that he is doing well.  He denies any symptoms of shortness of breath, orthopnea, PND, or peripheral edema.  He has not looked into Leqvio since our last discussion.    Assessment and Plan:  Lokesh Shipley \"Sandor\" is a very pleasant 75-year-old male with past medical history notable for CAD (history of CABG x 3 in 2009 with LIMA to LAD, SVG to diagonal, and SVG to RCA; recent history of STEMI in 2/2024 receiving POBA of ramus followed by staged PCI of ramus and SVG to OM1; has been on DAPT in the form of aspirin and Brilinta as well as metoprolol; has residual  of left circumflex, severe stenosis of OM1 and severe stenosis of RPDA which were all treated medically; his LIMA, SVG to diagonal, and SVG to RCA were all patent), hypertension, and hyperlipidemia (history of myalgias on multiple forms of statin therapy; most recently had worsening of his chronic back pain shortly after initiating Repatha which has been on hold; was " considered for Leqvio but his insurance did not cover this; he has a different insurance now).    He is doing well from a cardiovascular standpoint.  Blood pressure was elevated on initial check but on second check it came down.  We talked about Leqvio recommendations.  He has new insurance now so he will look into coverage for this.  He will finish his current prescription of Brilinta and then will be done with this.  Will continue with 20 mg daily.  Will see him back in 1 year for follow-up.      41 minutes spent on the date of the encounter with chart review, patient visit, care coordination, and documentation.    The longitudinal plan of care for the diagnose(s)/condition(s) as documented were addressed during this visit. Due to the added complexity in care, I will continue to support Lokesh Shipley Jr.  in the subsequent management and with ongoing continuity of care.     This note was completed in part using Dragon voice recognition software. Although reviewed after completion, some word and grammatical errors may occur.    Orders this Visit:  No orders of the defined types were placed in this encounter.    Orders Placed This Encounter   Medications    MULTIPLE VITAMIN PO     Sig: Take by mouth daily.     Medications Discontinued During This Encounter   Medication Reason    acetaminophen (TYLENOL) 325 MG tablet Therapy completed (No AVS)    ticagrelor (BRILINTA) 90 MG tablet          Encounter Diagnosis   Name Primary?    Coronary artery disease involving native coronary artery of native heart without angina pectoris        CURRENT MEDICATIONS:  Current Outpatient Medications   Medication Sig Dispense Refill    aspirin 81 MG EC tablet Take 1 tablet (81 mg) by mouth daily Start tomorrow. 30 tablet 3    losartan (COZAAR) 50 MG tablet Take 1 tablet (50 mg) by mouth daily. (Patient taking differently: Take 50 mg by mouth every evening.) 90 tablet 2    metoprolol succinate ER (TOPROL XL) 25 MG 24 hr tablet Take  1 tablet (25 mg) by mouth daily. (Patient taking differently: Take 25 mg by mouth every morning.) 90 tablet 2    MULTIPLE VITAMIN PO Take by mouth daily.      nitroGLYcerin (NITROSTAT) 0.4 MG sublingual tablet For chest pain place 1 tablet under the tongue every 5 minutes for 3 doses. If symptoms persist 5 minutes after 1st dose call 911.         ALLERGIES     Allergies   Allergen Reactions    Pcn [Penicillins] Shortness Of Breath    Sulfa Antibiotics Shortness Of Breath       PAST MEDICAL HISTORY:  Past Medical History:   Diagnosis Date    Coronary artery disease     Heart attack (H) 2000    Hypertension     Other chronic pain     low back and radiates down both legs.  Also, numbness & tingling down both legs.    Stented coronary artery 2000    x1       PAST SURGICAL HISTORY:  Past Surgical History:   Procedure Laterality Date    angiogram with stent placement      BACK SURGERY      low back discectomy    CARDIAC SURGERY  2009    CABG x 3    CV CORONARY ANGIOGRAM N/A 2/27/2024    Procedure: Coronary Angiogram;  Surgeon: Moe Rosales MD;  Location: [facility] CARDIAC CATH LAB    CV CORONARY ANGIOGRAM N/A 3/1/2024    Procedure: Coronary Angiogram;  Surgeon: Steff Miranda MD;  Location: [facility] CARDIAC CATH LAB    CV INTRAVASULAR ULTRASOUND N/A 2/27/2024    Procedure: Intravascular Ultrasound;  Surgeon: Moe Rosales MD;  Location: [facility] CARDIAC CATH LAB    CV PCI N/A 2/27/2024    Procedure: Percutaneous Coronary Intervention;  Surgeon: Moe Rosales MD;  Location: [facility] CARDIAC CATH LAB    CV PCI N/A 3/1/2024    Procedure: Percutaneous Coronary Intervention;  Surgeon: Steff Miranda MD;  Location: [facility] CARDIAC CATH LAB    OPTICAL TRACKING SYSTEM FUSION SPINE POSTERIOR LUMBAR TWO LEVELS N/A 3/12/2020    Procedure: L3-4 and L4-5 decompressive laminectomies with medial facetectomies with decompression and exposure of the L3, L4 and L5 roots bilaterally  Placement of Nuvasive Reline  traction pedicle screws bilaterally from L3-5  L3-5 posterior lateral fusion with placement of locally harvested autologous bone;  Surgeon: Jose Lundberg MD;  Location: [facility] OR       FAMILY HISTORY:  Family History   Problem Relation Age of Onset    Heart Surgery Mother         bypass    Diabetes Father         late onset    Heart Surgery Father         bypass       SOCIAL HISTORY:  Social History     Socioeconomic History    Marital status:      Spouse name: None    Number of children: None    Years of education: None    Highest education level: None   Tobacco Use    Smoking status: Never    Smokeless tobacco: Never   Substance and Sexual Activity    Alcohol use: Yes     Comment: 0-4 weekly    Drug use: Never     Social Drivers of Health     Financial Resource Strain: Low Risk  (3/6/2024)    [facility]    Financial Resource Strain     Difficulty of Paying Living Expenses: 3   Food Insecurity: No Food Insecurity (3/6/2024)    Received from [facility]    Food Insecurity     Do you worry your food will run out before you are able to buy more?: 1   Transportation Needs: No Transportation Needs (3/6/2024)    Received from [facility]    Transportation Needs     Does lack of transportation keep you from medical appointments?: 1     Does lack of transportation keep you from work, meetings or getting things that you need?: 1   Social Connections: Socially Integrated (3/6/2024)    Received from [facility]    Social Connections     Do you often feel lonely or isolated from those around you?: 0   Housing Stability: Low Risk  (3/6/2024)    Received from [facility]    Housing Stability     What is your housing situation today?: 1       Review of Systems:  Skin:        Eyes:       ENT:   "     Respiratory:       Cardiovascular:       Gastroenterology:      Genitourinary:       Musculoskeletal:       Neurologic:       Psychiatric:       Heme/Lymph/Imm:       Endocrine:         Physical Exam:  Vitals: BP (!) 140/78 (BP Location: Right arm, Patient Position: Sitting, Cuff Size: Adult Regular)   Pulse 61   Ht 1.753 m (5' 9\")   Wt 96 kg (211 lb 9.6 oz)   SpO2 95%   BMI 31.25 kg/m       GEN:  NAD  NECK: No JVD  C/V:  Regular rate and rhythm, no murmur, rub or gallop.  RESP: Clear to auscultation bilaterally without wheezing, rales, or rhonchi.  GI: Abdomen soft, nontender, nondistended.   EXTREM: No pitting LE edema.   NEURO: Alert and oriented, cooperative. No obvious focal deficits.   PSYCH: Normal affect.  SKIN: Warm and dry.       Recent Lab Results:  LIPID RESULTS:  Lab Results   Component Value Date    CHOL 200 (H) 05/05/2025    CHOL 200 12/17/2013    HDL 48 05/05/2025    HDL 48 12/17/2013     (H) 05/05/2025     (H) 12/17/2013    TRIG 82 05/05/2025    TRIG 95 12/17/2013    CHOLHDLRATIO 4.2 12/17/2013       LIVER ENZYME RESULTS:  Lab Results   Component Value Date     (H) 02/28/2024    AST 39 10/18/2014    ALT 29 02/28/2024    ALT 42 10/18/2014       CBC RESULTS:  Lab Results   Component Value Date    WBC 5.9 03/02/2024    WBC 7.4 10/21/2014    RBC 4.17 (L) 03/02/2024    RBC 4.85 10/18/2014    HGB 12.7 (L) 03/02/2024    HGB 12.0 (L) 03/14/2020    HCT 36.7 (L) 03/02/2024    HCT 43.9 10/18/2014    MCV 88 03/02/2024    MCV 91 10/18/2014    MCH 30.5 03/02/2024    MCH 31.8 10/18/2014    MCHC 34.6 03/02/2024    MCHC 35.1 10/18/2014    RDW 11.9 03/02/2024    RDW 12.6 10/18/2014     03/02/2024     10/18/2014       BMP RESULTS:  Lab Results   Component Value Date     03/02/2024     03/13/2020    POTASSIUM 4.3 03/02/2024    POTASSIUM 4.3 02/27/2024    POTASSIUM 4.0 09/20/2021    POTASSIUM 4.4 03/13/2020    CHLORIDE 102 03/02/2024    CHLORIDE 103 02/27/2024    " CHLORIDE 106 09/20/2021    CHLORIDE 106 03/13/2020    CO2 23 03/02/2024    CO2 27 09/20/2021    CO2 29 03/13/2020    ANIONGAP 11 03/02/2024    ANIONGAP 3 09/20/2021    ANIONGAP 5 03/13/2020    GLC 96 03/02/2024     (H) 02/27/2024     (H) 09/20/2021     (H) 03/13/2020    BUN 11.6 03/02/2024    BUN 17 02/27/2024    BUN 14 09/20/2021    BUN 15 03/13/2020    CR 0.78 03/02/2024    CR 0.72 03/13/2020    GFRESTIMATED >90 03/02/2024    GFRESTIMATED 56 (L) 09/19/2021    GFRESTIMATED >90 03/13/2020    GFRESTBLACK >90 03/13/2020    BROOK 9.0 03/02/2024    BROOK 8.5 03/13/2020        A1C RESULTS:  Lab Results   Component Value Date    A1C 6.1 (H) 02/28/2024    A1C 5.7 02/18/2009       INR RESULTS:  Lab Results   Component Value Date    INR 0.92 09/19/2021    INR 1.00 07/18/2009    INR 1.31 (H) 02/18/2009             Kevin Perez PA-C  May 6, 2025

## 2025-05-06 NOTE — PATIENT INSTRUCTIONS
Today's Plan:   I recommend you consider Leqvio--  If patient would like a price estimate, patient may:  1) contact Member Services department of their insurance or   2) complete a cost of care estimate request at [email or web address] or by calling [phone]     In both cases, patient should reference J-code  and CPT code 88996.      If you have questions or concerns please call my nurse team at [phone]    Scheduling phone number: [phone]  Reminder: Please bring in all current medications, over the counter supplements and vitamin bottles to your next appointment.    It was a pleasure seeing you today!               Component      Latest Ref Rng 3/1/2024  4:13 PM 5/5/2025  8:05 AM   Cholesterol      <200 mg/dL 189  200 (H)    Triglycerides      <150 mg/dL 84  82    HDL Cholesterol      >=40 mg/dL 50  48    LDL Cholesterol Calculated      <100 mg/dL 122 (H)  136 (H)    Non HDL Cholesterol      <130 mg/dL 139 (H)  152 (H)    Patient Fasting?  Yes

## 2025-08-06 NOTE — Clinical Note
Procedure scheduled as Elective. Upon rounding patient sitting up in chair, bilateral groin dressings dry and intact. Right side has small amount of dried blood on gauze. PIV leaking when flushed, removed with tip intact.

## (undated) DEVICE — DRSG KERLIX FLUFFS X5

## (undated) DEVICE — CATH IVUS OPTICROSS HD 6 3.6FR 1.18MM DIA 135CML H7493935408

## (undated) DEVICE — SU VICRYL 0 CT-1 CR 8X18" J740D

## (undated) DEVICE — DECANTER BAG 2002S

## (undated) DEVICE — GLOVE PROTEXIS BLUE W/NEU-THERA 8.5  2D73EB85

## (undated) DEVICE — MIDAS REX DISSECTING TOOL TRI-FLUTED BURR 14MH30T

## (undated) DEVICE — MANIFOLD KIT ANGIO AUTOMATED 014613

## (undated) DEVICE — CATH BALLOON NC EMERGE 5.00X8MM H7493926708500

## (undated) DEVICE — CATH LAUNCHER 6FR AL 1.0 LA6AL10

## (undated) DEVICE — INFL DVC BASIXCOMPAK PLYCRB 30 ATM 13IN 20ML IN4530

## (undated) DEVICE — GUIDEWIRE VASC 0.014INX180CM RUNTHROUGH 25-1011

## (undated) DEVICE — TUBING SUCTION 12"X1/4" N612

## (undated) DEVICE — DRAIN JACKSON PRATT CHANNEL 10FR RND SIL W/TROCAR JP-2227

## (undated) DEVICE — PEN MARKING SKIN W/LABELS 31145884

## (undated) DEVICE — KIT HAND CONTROL ANGIOTOUCH ACIST 65CM AT-P65

## (undated) DEVICE — DEVICE DUST COLLECTOR BONE BOX S-3500

## (undated) DEVICE — CATH ANGIO INFINITI 3DRC 4FRX100CM 538476

## (undated) DEVICE — LINEN DRAPE 54X72" 5467

## (undated) DEVICE — GLIDEWIRE TERUMO .035X180CM 1.5,, J-TIP GR3525

## (undated) DEVICE — CATH BALLOON CUTTING WOLVERINE 3.25X10MH74939401103250

## (undated) DEVICE — SPONGE SURGIFOAM 100 1974

## (undated) DEVICE — CATH GUIDING  7F MACH1 CLS3

## (undated) DEVICE — CATH LAUNCHER 6FR JR 4.0 LA6JR40

## (undated) DEVICE — CATH BALLOON EMERGE 3.0X15MM H7493918915300

## (undated) DEVICE — CATH DIAG MICATH MAMBA FLEX 135 VASC H7493928713540

## (undated) DEVICE — SU VICRYL 2-0 CT-1 18' J739D

## (undated) DEVICE — TOTE ANGIO CORP PC15AT SAN32CC83O

## (undated) DEVICE — SPONGE COTTONOID 1/2X1" 80-1402

## (undated) DEVICE — ESU BIPOLAR SEALER AQUAMANTYS 6MM 23-112-1

## (undated) DEVICE — GOWN XLG DISP 9545

## (undated) DEVICE — SHEATH INTRODUCER 7FR 10CML 0.021" DIL 22GA 25MM NDL 60-1070

## (undated) DEVICE — LINEN ORTHO ACL PACK 5447

## (undated) DEVICE — CATH BALLOON NC EMERGE 2.75X15MM H7493926715270

## (undated) DEVICE — CATH TRAY FOLEY COUDE SURESTEP 16FR W/DRN BAG LATEX A304416A

## (undated) DEVICE — SU STRATAFIX PDS PLUS 0 CT-1 18" SXPP1A401

## (undated) DEVICE — GUIDEWIRE VASC 0.035INX150CM INQWIRE J TIP IQ35F150J3F/A

## (undated) DEVICE — GLOVE PROTEXIS MICRO 7.5  2D73PM75

## (undated) DEVICE — RX SURGIFLO HEMOSTATIC MATRIX 8ML 2991

## (undated) DEVICE — GLIDEWIRE TERUMO RADIOFOCUS .035X260CM ANG EXCHANGE GR3509

## (undated) DEVICE — ESU GROUND PAD ADULT W/CORD E7507

## (undated) DEVICE — GLOVE PROTEXIS W/NEU-THERA 8.5  2D73TE85

## (undated) DEVICE — SU WND CLOSURE VLOC 180 ABS 3-0 12" V-20 VLOCL0614

## (undated) DEVICE — BLADE BONE MILL STRK 5.0MM MED 5400-701-000

## (undated) DEVICE — INTRO GLIDESHEATH SLENDER 6FR 10X45CM 60-1060

## (undated) DEVICE — Device

## (undated) DEVICE — PREP DURAPREP 26ML APL 8630

## (undated) DEVICE — DRAPE MICROSCOPE OPMI ZEISS 48X118" 306071-0000-000

## (undated) DEVICE — BLADE CLIPPER SGL USE 9680

## (undated) DEVICE — SUCTION FRAZIER 12FR W/OBTURATOR 33120

## (undated) DEVICE — CATH BALLOON CUTTING WOLVERINE 2.75X15MH74939401152750

## (undated) DEVICE — CATH LAUNCHER 6FR EBU 3.5 LA6EBU35

## (undated) DEVICE — CATH ANGIO INFINITI JL5 4FRX100CM 538422

## (undated) DEVICE — WIRE GLIDE 0.035"X150CM VASC GR3506

## (undated) DEVICE — CATH BALLOON NC EMERGE 3.75X15MM H7493926715370

## (undated) DEVICE — RAD INFLATOR BASIC COMPAK  IN4130

## (undated) DEVICE — GOWN REINFORCED XXLG 9071

## (undated) DEVICE — DEFIB PRO-PADZ LVP LQD GEL ADULT 8900-2105-01

## (undated) DEVICE — CATH ANGIO INFINITI IM 4FRX100CM 538460

## (undated) DEVICE — CATH ANGIO INFINITI 3DRC 6FRX100CM 534676T

## (undated) DEVICE — GUIDEWIRE VASC 0.014INX190CM J TIP CGRXT190HJ

## (undated) DEVICE — DRAIN JACKSON PRATT RESERVOIR 100ML SU130-1305

## (undated) DEVICE — LINEN POUCH DBL 5427

## (undated) DEVICE — MARKER SPHERES PASSIVE MEDT PACK 5 8801075

## (undated) DEVICE — CATH ANGIO INFINITI MPA2 4FRX100CM 2 SH 538442

## (undated) DEVICE — SUCTION FRAZIER 12FR W/HANDLE K73

## (undated) DEVICE — KIT LG BORE TOUHY ACCESS PLUS MAP152

## (undated) DEVICE — PACK SET-UP STD 9102

## (undated) DEVICE — VALVE HEMOSTASIS .096" COPILOT MECH 1003331

## (undated) DEVICE — GUIDEWIRE ASAHI GAIA SECOND 190CM MICRO

## (undated) DEVICE — CATH US OD 5FR OD SEC 2.9FR EAGLE EYE PLATINUM 0.014 85900P

## (undated) DEVICE — DRSG TELFA ISLAND 4X10"

## (undated) DEVICE — CATH BALLOON EMERGE 2.5X15MM H7493918915250

## (undated) DEVICE — WIRE GUIDE 0.035"X260CM SAFE-T-J EXCHANGE G00517

## (undated) DEVICE — MARKER SPHERES PASSIVE MEDT PACK 1 8801071

## (undated) DEVICE — RAD G/W INQWIRE .035X260CM J-TIP EXCHANGE IQ35F260J1O5RS

## (undated) DEVICE — WIRE GUIDE HI-TRQ  WHISPER MS JTIP 0.014"X190CM 1005357HJ

## (undated) DEVICE — CATH LAUNCHER 6FR LA6EBU375

## (undated) DEVICE — POSITIONER PT PRONESAFE HEAD REST W/DERMAPROX INSERT 40599

## (undated) DEVICE — SUCTION MANIFOLD NEPTUNE 2 SYS 4 PORT 0702-020-000

## (undated) DEVICE — PACK SMALL SPINE RIDGES

## (undated) DEVICE — SOL NACL 0.9% INJ 250ML BAG 2B1322Q

## (undated) DEVICE — CATH LAUNCHER 6FR JL 4.0 LA6JL40

## (undated) DEVICE — CATH BALLOON EMERGE 2.25X12MMH7493918912220

## (undated) DEVICE — ESU CLEANER TIP 31142717

## (undated) DEVICE — ADH SKIN CLOSURE PREMIERPRO EXOFIN 1.0ML 3470

## (undated) DEVICE — SLEEVE TR BAND RADIAL COMPRESSION DEVICE 24CM TRB24-REG

## (undated) DEVICE — GUIDEWIRE FIGHTER STRAIGHT 190

## (undated) DEVICE — GW .014 X 190CM STR HI-TORQUE

## (undated) DEVICE — CATH DIAG 4FR JL 4.5 538417

## (undated) DEVICE — INTRODUCER SHEATH GREEN 6.5FRX11CM .038IN PSI-6F-11-038ACT

## (undated) DEVICE — GUIDEWIRE RUNTHROUGH IZANAI PTCA .014 X 180CM 25-5211

## (undated) RX ORDER — HEPARIN SODIUM 200 [USP'U]/100ML
INJECTION, SOLUTION INTRAVENOUS
Status: DISPENSED
Start: 2024-03-01

## (undated) RX ORDER — FENTANYL CITRATE 50 UG/ML
INJECTION, SOLUTION INTRAMUSCULAR; INTRAVENOUS
Status: DISPENSED
Start: 2024-02-27

## (undated) RX ORDER — NEOSTIGMINE METHYLSULFATE 1 MG/ML
VIAL (ML) INJECTION
Status: DISPENSED
Start: 2020-03-12

## (undated) RX ORDER — HEPARIN SODIUM 1000 [USP'U]/ML
INJECTION, SOLUTION INTRAVENOUS; SUBCUTANEOUS
Status: DISPENSED
Start: 2024-02-27

## (undated) RX ORDER — BUPIVACAINE HYDROCHLORIDE AND EPINEPHRINE 5; 5 MG/ML; UG/ML
INJECTION, SOLUTION EPIDURAL; INTRACAUDAL; PERINEURAL
Status: DISPENSED
Start: 2020-03-12

## (undated) RX ORDER — DIAZEPAM 10 MG/2ML
INJECTION, SOLUTION INTRAMUSCULAR; INTRAVENOUS
Status: DISPENSED
Start: 2020-03-12

## (undated) RX ORDER — ONDANSETRON 2 MG/ML
INJECTION INTRAMUSCULAR; INTRAVENOUS
Status: DISPENSED
Start: 2020-03-12

## (undated) RX ORDER — NITROGLYCERIN 5 MG/ML
VIAL (ML) INTRAVENOUS
Status: DISPENSED
Start: 2024-02-27

## (undated) RX ORDER — FENTANYL CITRATE 50 UG/ML
INJECTION, SOLUTION INTRAMUSCULAR; INTRAVENOUS
Status: DISPENSED
Start: 2020-03-12

## (undated) RX ORDER — PROPOFOL 10 MG/ML
INJECTION, EMULSION INTRAVENOUS
Status: DISPENSED
Start: 2020-03-12

## (undated) RX ORDER — LIDOCAINE HYDROCHLORIDE 10 MG/ML
INJECTION, SOLUTION EPIDURAL; INFILTRATION; INTRACAUDAL; PERINEURAL
Status: DISPENSED
Start: 2024-02-27

## (undated) RX ORDER — FENTANYL CITRATE 50 UG/ML
INJECTION, SOLUTION INTRAMUSCULAR; INTRAVENOUS
Status: DISPENSED
Start: 2024-03-01

## (undated) RX ORDER — VANCOMYCIN HYDROCHLORIDE 1 G/20ML
INJECTION, POWDER, LYOPHILIZED, FOR SOLUTION INTRAVENOUS
Status: DISPENSED
Start: 2020-03-12

## (undated) RX ORDER — KETOROLAC TROMETHAMINE 30 MG/ML
INJECTION, SOLUTION INTRAMUSCULAR; INTRAVENOUS
Status: DISPENSED
Start: 2020-03-12

## (undated) RX ORDER — DEXAMETHASONE SODIUM PHOSPHATE 4 MG/ML
INJECTION, SOLUTION INTRA-ARTICULAR; INTRALESIONAL; INTRAMUSCULAR; INTRAVENOUS; SOFT TISSUE
Status: DISPENSED
Start: 2020-03-12

## (undated) RX ORDER — LABETALOL 20 MG/4 ML (5 MG/ML) INTRAVENOUS SYRINGE
Status: DISPENSED
Start: 2020-03-12

## (undated) RX ORDER — GLYCOPYRROLATE 0.2 MG/ML
INJECTION INTRAMUSCULAR; INTRAVENOUS
Status: DISPENSED
Start: 2020-03-12

## (undated) RX ORDER — CLINDAMYCIN PHOSPHATE 900 MG/50ML
INJECTION, SOLUTION INTRAVENOUS
Status: DISPENSED
Start: 2020-03-12

## (undated) RX ORDER — VERAPAMIL HYDROCHLORIDE 2.5 MG/ML
INJECTION, SOLUTION INTRAVENOUS
Status: DISPENSED
Start: 2024-03-01

## (undated) RX ORDER — LIDOCAINE HYDROCHLORIDE 10 MG/ML
INJECTION, SOLUTION EPIDURAL; INFILTRATION; INTRACAUDAL; PERINEURAL
Status: DISPENSED
Start: 2020-03-12